# Patient Record
Sex: MALE | Race: WHITE | NOT HISPANIC OR LATINO | Employment: OTHER | URBAN - METROPOLITAN AREA
[De-identification: names, ages, dates, MRNs, and addresses within clinical notes are randomized per-mention and may not be internally consistent; named-entity substitution may affect disease eponyms.]

---

## 2017-06-02 ENCOUNTER — LAB VISIT (OUTPATIENT)
Dept: LAB | Facility: HOSPITAL | Age: 47
End: 2017-06-02
Attending: INTERNAL MEDICINE
Payer: MEDICARE

## 2017-06-02 ENCOUNTER — TELEPHONE (OUTPATIENT)
Dept: INTERNAL MEDICINE | Facility: CLINIC | Age: 47
End: 2017-06-02

## 2017-06-02 DIAGNOSIS — E78.5 HYPERLIPIDEMIA, UNSPECIFIED HYPERLIPIDEMIA TYPE: ICD-10-CM

## 2017-06-02 DIAGNOSIS — E66.3 OVERWEIGHT (BMI 25.0-29.9): ICD-10-CM

## 2017-06-02 DIAGNOSIS — K21.9 GASTROESOPHAGEAL REFLUX DISEASE, ESOPHAGITIS PRESENCE NOT SPECIFIED: ICD-10-CM

## 2017-06-02 DIAGNOSIS — R30.0 BURNING WITH URINATION: Primary | ICD-10-CM

## 2017-06-02 DIAGNOSIS — M10.069 IDIOPATHIC GOUT OF KNEE, UNSPECIFIED CHRONICITY, UNSPECIFIED LATERALITY: ICD-10-CM

## 2017-06-02 DIAGNOSIS — E55.9 VITAMIN D DEFICIENCY DISEASE: ICD-10-CM

## 2017-06-02 DIAGNOSIS — R30.0 BURNING WITH URINATION: ICD-10-CM

## 2017-06-02 LAB
25(OH)D3+25(OH)D2 SERPL-MCNC: 46 NG/ML
ALBUMIN SERPL BCP-MCNC: 3.8 G/DL
ALP SERPL-CCNC: 85 U/L
ALT SERPL W/O P-5'-P-CCNC: 19 U/L
ANION GAP SERPL CALC-SCNC: 12 MMOL/L
AST SERPL-CCNC: 20 U/L
BACTERIA #/AREA URNS HPF: NORMAL /HPF
BASOPHILS # BLD AUTO: 0.11 K/UL
BASOPHILS NFR BLD: 0.8 %
BILIRUB SERPL-MCNC: 0.6 MG/DL
BILIRUB UR QL STRIP: NEGATIVE
BUN SERPL-MCNC: 15 MG/DL
CALCIUM SERPL-MCNC: 10.3 MG/DL
CHLORIDE SERPL-SCNC: 102 MMOL/L
CHOLEST/HDLC SERPL: 6 {RATIO}
CLARITY UR: CLEAR
CO2 SERPL-SCNC: 26 MMOL/L
COLOR UR: YELLOW
CREAT SERPL-MCNC: 1.2 MG/DL
DIFFERENTIAL METHOD: ABNORMAL
EOSINOPHIL # BLD AUTO: 0.1 K/UL
EOSINOPHIL NFR BLD: 1 %
ERYTHROCYTE [DISTWIDTH] IN BLOOD BY AUTOMATED COUNT: 15.5 %
EST. GFR  (AFRICAN AMERICAN): >60 ML/MIN/1.73 M^2
EST. GFR  (NON AFRICAN AMERICAN): >60 ML/MIN/1.73 M^2
GLUCOSE SERPL-MCNC: 104 MG/DL
GLUCOSE UR QL STRIP: NEGATIVE
HCT VFR BLD AUTO: 48.4 %
HDL/CHOLESTEROL RATIO: 16.7 %
HDLC SERPL-MCNC: 240 MG/DL
HDLC SERPL-MCNC: 40 MG/DL
HGB BLD-MCNC: 16.3 G/DL
HGB UR QL STRIP: NEGATIVE
HYALINE CASTS #/AREA URNS LPF: 0 /LPF
KETONES UR QL STRIP: NEGATIVE
LDLC SERPL CALC-MCNC: 146.2 MG/DL
LEUKOCYTE ESTERASE UR QL STRIP: NEGATIVE
LYMPHOCYTES # BLD AUTO: 4.1 K/UL
LYMPHOCYTES NFR BLD: 31 %
MCH RBC QN AUTO: 29.6 PG
MCHC RBC AUTO-ENTMCNC: 33.7 %
MCV RBC AUTO: 88 FL
MICROSCOPIC COMMENT: NORMAL
MONOCYTES # BLD AUTO: 0.7 K/UL
MONOCYTES NFR BLD: 5.5 %
NEUTROPHILS # BLD AUTO: 8.1 K/UL
NEUTROPHILS NFR BLD: 61.7 %
NITRITE UR QL STRIP: NEGATIVE
NONHDLC SERPL-MCNC: 200 MG/DL
PH UR STRIP: 6 [PH] (ref 5–8)
PLATELET # BLD AUTO: 332 K/UL
PMV BLD AUTO: 9.9 FL
POTASSIUM SERPL-SCNC: 4 MMOL/L
PROT SERPL-MCNC: 8.3 G/DL
PROT UR QL STRIP: ABNORMAL
RBC # BLD AUTO: 5.5 M/UL
RBC #/AREA URNS HPF: 0 /HPF (ref 0–4)
SODIUM SERPL-SCNC: 140 MMOL/L
SP GR UR STRIP: 1.01 (ref 1–1.03)
SQUAMOUS #/AREA URNS HPF: 3 /HPF
TRIGL SERPL-MCNC: 269 MG/DL
URATE SERPL-MCNC: 4.7 MG/DL
URN SPEC COLLECT METH UR: ABNORMAL
UROBILINOGEN UR STRIP-ACNC: NEGATIVE EU/DL
WBC # BLD AUTO: 13.07 K/UL
WBC #/AREA URNS HPF: 1 /HPF (ref 0–5)

## 2017-06-02 PROCEDURE — 82306 VITAMIN D 25 HYDROXY: CPT

## 2017-06-02 PROCEDURE — 84550 ASSAY OF BLOOD/URIC ACID: CPT

## 2017-06-02 PROCEDURE — 81000 URINALYSIS NONAUTO W/SCOPE: CPT

## 2017-06-02 PROCEDURE — 80053 COMPREHEN METABOLIC PANEL: CPT

## 2017-06-02 PROCEDURE — 80061 LIPID PANEL: CPT

## 2017-06-02 PROCEDURE — 85025 COMPLETE CBC W/AUTO DIFF WBC: CPT

## 2017-06-02 PROCEDURE — 36415 COLL VENOUS BLD VENIPUNCTURE: CPT

## 2017-06-05 ENCOUNTER — OFFICE VISIT (OUTPATIENT)
Dept: INTERNAL MEDICINE | Facility: CLINIC | Age: 47
End: 2017-06-05
Payer: MEDICARE

## 2017-06-05 VITALS
HEART RATE: 93 BPM | RESPIRATION RATE: 18 BRPM | BODY MASS INDEX: 25.06 KG/M2 | SYSTOLIC BLOOD PRESSURE: 116 MMHG | WEIGHT: 127.63 LBS | DIASTOLIC BLOOD PRESSURE: 64 MMHG | HEIGHT: 60 IN

## 2017-06-05 DIAGNOSIS — E55.9 VITAMIN D DEFICIENCY DISEASE: ICD-10-CM

## 2017-06-05 DIAGNOSIS — Q90.9 DOWN SYNDROME: Primary | ICD-10-CM

## 2017-06-05 DIAGNOSIS — K21.9 GASTROESOPHAGEAL REFLUX DISEASE, ESOPHAGITIS PRESENCE NOT SPECIFIED: ICD-10-CM

## 2017-06-05 DIAGNOSIS — M10.069 IDIOPATHIC GOUT OF KNEE, UNSPECIFIED CHRONICITY, UNSPECIFIED LATERALITY: ICD-10-CM

## 2017-06-05 DIAGNOSIS — E78.5 HYPERLIPIDEMIA, UNSPECIFIED HYPERLIPIDEMIA TYPE: ICD-10-CM

## 2017-06-05 DIAGNOSIS — Z86.73 HISTORY OF TIA (TRANSIENT ISCHEMIC ATTACK): ICD-10-CM

## 2017-06-05 PROCEDURE — 99499 UNLISTED E&M SERVICE: CPT | Mod: S$GLB,,, | Performed by: INTERNAL MEDICINE

## 2017-06-05 PROCEDURE — 99214 OFFICE O/P EST MOD 30 MIN: CPT | Mod: S$GLB,,, | Performed by: INTERNAL MEDICINE

## 2017-06-05 PROCEDURE — 99999 PR PBB SHADOW E&M-EST. PATIENT-LVL III: CPT | Mod: PBBFAC,,, | Performed by: INTERNAL MEDICINE

## 2017-06-05 NOTE — PROGRESS NOTES
Subjective:       Patient ID: Janes Strange is a 47 y.o. male.    Chief Complaint: Follow-up      HPI:  Patient is known to me and presents for follow up chronic medical issues. He has Down Syndrome, lives with father.      Gout: on allopurinol. No recent flares. No joint pain or swelling today     Seasonal allergies: well controlled on Claritin. Denies sneezing, itchy eyes, cough, SOB.     HLD: on pravastatin. Denies side effects from medications. LDL around 140s, this is stable for him.     GERD: on omeprazole. Denies chest pains, abd pain, n/v/d/c. Denies belching.     History of CVA: no residual effects. On plavix therapy. Sounds like might have been TIA. No records to review. Following melo John.     No acute complaints today.  Past Medical History:   Diagnosis Date    Down's syndrome     Seizure        History reviewed. No pertinent family history.    Social History     Social History    Marital status: Single     Spouse name: N/A    Number of children: N/A    Years of education: N/A     Occupational History    Not on file.     Social History Main Topics    Smoking status: Never Smoker    Smokeless tobacco: Never Used    Alcohol use No    Drug use: No    Sexual activity: No     Other Topics Concern    Not on file     Social History Narrative    No narrative on file       Review of Systems   Constitutional: Negative for activity change, fatigue, fever and unexpected weight change.   HENT: Negative for congestion, ear pain, hearing loss, rhinorrhea, sore throat and tinnitus.    Eyes: Negative for pain, redness and visual disturbance.   Respiratory: Negative for cough, shortness of breath and wheezing.    Cardiovascular: Negative for chest pain, palpitations and leg swelling.   Gastrointestinal: Negative for abdominal pain, blood in stool, constipation, diarrhea, nausea and vomiting.   Genitourinary: Negative for decreased urine volume, dysuria, frequency and urgency.   Musculoskeletal:  Negative for back pain, joint swelling and neck pain.   Skin: Negative for color change, rash and wound.   Neurological: Negative for dizziness, tremors, weakness, light-headedness and headaches.         Objective:      Physical Exam   Constitutional: He appears well-developed and well-nourished. No distress.   HENT:   Head: Normocephalic and atraumatic.   Right Ear: External ear normal.   Left Ear: External ear normal.   Enlarged tongue consistent with Down's syndrome   Eyes: Conjunctivae and EOM are normal. Pupils are equal, round, and reactive to light.   Neck: Neck supple. No tracheal deviation present. No thyromegaly present.   Cardiovascular: Normal rate and regular rhythm.  Exam reveals no gallop and no friction rub.    No murmur heard.  Pulmonary/Chest: Effort normal and breath sounds normal. No respiratory distress. He has no wheezes. He has no rales.   Abdominal: Soft. Bowel sounds are normal. He exhibits no distension. There is no tenderness. There is no rebound and no guarding.   Neurological: He is alert.   Not cooperative with exam. Down's syndrome   Skin: Skin is warm and dry.   Psychiatric: He has a normal mood and affect. His behavior is normal.   Vitals reviewed.      Assessment:       1. Down syndrome    2. Idiopathic gout of knee, unspecified chronicity, unspecified laterality    3. Hyperlipidemia, unspecified hyperlipidemia type    4. Gastroesophageal reflux disease, esophagitis presence not specified    5. History of TIA (transient ischemic attack)    6. Vitamin D deficiency disease        Plan:       Janes was seen today for follow-up.    Diagnoses and all orders for this visit:    Down syndrome  Stable  Doing well  Living with father    Idiopathic gout of knee, unspecified chronicity, unspecified laterality  -     Uric acid; Future  Uric acid at goal  Cont allopurinol at same dose  No acute attacks    Hyperlipidemia, unspecified hyperlipidemia type  -     CBC auto differential; Future  -      Comprehensive metabolic panel; Future  -     Lipid panel; Future  Chronic, well controlled  Cont pravastatin at same dose    Gastroesophageal reflux disease, esophagitis presence not specified  Chronic, well controlled  Cont PPI at same dose    History of TIA (transient ischemic attack)  -     CBC auto differential; Future  -     Comprehensive metabolic panel; Future  -     Lipid panel; Future  Cont Plavix  No new sx. No residual deficits    Vitamin D deficiency disease  -     Vitamin D; Future  Chronic, controlled  Cont Vit D at same dose  Labs normal    Health Maintenance:  -CRC: not due  -PSA: not due  -Bone Density: not due  -AAA: never smoker  -tobacco: never smoker  -Vaccines  Flu 2016 completed      RTC 6 months with labs and PRN

## 2017-11-17 RX ORDER — ALLOPURINOL 300 MG/1
TABLET ORAL
Qty: 90 TABLET | Refills: 3 | Status: SHIPPED | OUTPATIENT
Start: 2017-11-17 | End: 2017-12-04 | Stop reason: SDUPTHER

## 2017-11-17 RX ORDER — CLOPIDOGREL BISULFATE 75 MG/1
TABLET ORAL
Qty: 90 TABLET | Refills: 3 | Status: SHIPPED | OUTPATIENT
Start: 2017-11-17 | End: 2017-12-04 | Stop reason: SDUPTHER

## 2017-11-27 ENCOUNTER — LAB VISIT (OUTPATIENT)
Dept: LAB | Facility: HOSPITAL | Age: 47
End: 2017-11-27
Attending: INTERNAL MEDICINE
Payer: MEDICARE

## 2017-11-27 DIAGNOSIS — Z86.73 HISTORY OF TIA (TRANSIENT ISCHEMIC ATTACK): ICD-10-CM

## 2017-11-27 DIAGNOSIS — E78.5 HYPERLIPIDEMIA, UNSPECIFIED HYPERLIPIDEMIA TYPE: ICD-10-CM

## 2017-11-27 DIAGNOSIS — M10.069 IDIOPATHIC GOUT OF KNEE, UNSPECIFIED CHRONICITY, UNSPECIFIED LATERALITY: ICD-10-CM

## 2017-11-27 DIAGNOSIS — E55.9 VITAMIN D DEFICIENCY DISEASE: ICD-10-CM

## 2017-11-27 LAB
25(OH)D3+25(OH)D2 SERPL-MCNC: 34 NG/ML
ALBUMIN SERPL BCP-MCNC: 3.3 G/DL
ALP SERPL-CCNC: 87 U/L
ALT SERPL W/O P-5'-P-CCNC: 24 U/L
ANION GAP SERPL CALC-SCNC: 9 MMOL/L
AST SERPL-CCNC: 21 U/L
BASOPHILS # BLD AUTO: 0.18 K/UL
BASOPHILS NFR BLD: 1.6 %
BILIRUB SERPL-MCNC: 0.3 MG/DL
BUN SERPL-MCNC: 27 MG/DL
CALCIUM SERPL-MCNC: 9.9 MG/DL
CHLORIDE SERPL-SCNC: 107 MMOL/L
CHOLEST SERPL-MCNC: 202 MG/DL
CHOLEST/HDLC SERPL: 4.9 {RATIO}
CO2 SERPL-SCNC: 26 MMOL/L
CREAT SERPL-MCNC: 1.1 MG/DL
DIFFERENTIAL METHOD: ABNORMAL
EOSINOPHIL # BLD AUTO: 0.1 K/UL
EOSINOPHIL NFR BLD: 0.8 %
ERYTHROCYTE [DISTWIDTH] IN BLOOD BY AUTOMATED COUNT: 15.3 %
EST. GFR  (AFRICAN AMERICAN): >60 ML/MIN/1.73 M^2
EST. GFR  (NON AFRICAN AMERICAN): >60 ML/MIN/1.73 M^2
GLUCOSE SERPL-MCNC: 110 MG/DL
HCT VFR BLD AUTO: 46.1 %
HDLC SERPL-MCNC: 41 MG/DL
HDLC SERPL: 20.3 %
HGB BLD-MCNC: 15.3 G/DL
LDLC SERPL CALC-MCNC: 123.2 MG/DL
LYMPHOCYTES # BLD AUTO: 4.2 K/UL
LYMPHOCYTES NFR BLD: 36.5 %
MCH RBC QN AUTO: 29.9 PG
MCHC RBC AUTO-ENTMCNC: 33.2 G/DL
MCV RBC AUTO: 90 FL
MONOCYTES # BLD AUTO: 0.8 K/UL
MONOCYTES NFR BLD: 7.2 %
NEUTROPHILS # BLD AUTO: 6.2 K/UL
NEUTROPHILS NFR BLD: 53.9 %
NONHDLC SERPL-MCNC: 161 MG/DL
PLATELET # BLD AUTO: 311 K/UL
PMV BLD AUTO: 9.6 FL
POTASSIUM SERPL-SCNC: 4.2 MMOL/L
PROT SERPL-MCNC: 7.3 G/DL
RBC # BLD AUTO: 5.11 M/UL
SODIUM SERPL-SCNC: 142 MMOL/L
TRIGL SERPL-MCNC: 189 MG/DL
URATE SERPL-MCNC: 4.6 MG/DL
WBC # BLD AUTO: 11.46 K/UL

## 2017-11-27 PROCEDURE — 80061 LIPID PANEL: CPT

## 2017-11-27 PROCEDURE — 80053 COMPREHEN METABOLIC PANEL: CPT

## 2017-11-27 PROCEDURE — 85025 COMPLETE CBC W/AUTO DIFF WBC: CPT

## 2017-11-27 PROCEDURE — 82306 VITAMIN D 25 HYDROXY: CPT

## 2017-11-27 PROCEDURE — 36415 COLL VENOUS BLD VENIPUNCTURE: CPT

## 2017-11-27 PROCEDURE — 84550 ASSAY OF BLOOD/URIC ACID: CPT

## 2017-12-04 ENCOUNTER — OFFICE VISIT (OUTPATIENT)
Dept: INTERNAL MEDICINE | Facility: CLINIC | Age: 47
End: 2017-12-04
Payer: MEDICARE

## 2017-12-04 VITALS
SYSTOLIC BLOOD PRESSURE: 108 MMHG | HEART RATE: 86 BPM | BODY MASS INDEX: 26.92 KG/M2 | OXYGEN SATURATION: 98 % | HEIGHT: 60 IN | DIASTOLIC BLOOD PRESSURE: 62 MMHG | RESPIRATION RATE: 18 BRPM | WEIGHT: 137.13 LBS

## 2017-12-04 DIAGNOSIS — Z88.9 H/O SEASONAL ALLERGIES: ICD-10-CM

## 2017-12-04 DIAGNOSIS — Q90.9 DOWN SYNDROME: Primary | ICD-10-CM

## 2017-12-04 DIAGNOSIS — E66.3 OVERWEIGHT (BMI 25.0-29.9): ICD-10-CM

## 2017-12-04 DIAGNOSIS — M10.069 IDIOPATHIC GOUT OF KNEE, UNSPECIFIED CHRONICITY, UNSPECIFIED LATERALITY: ICD-10-CM

## 2017-12-04 DIAGNOSIS — E78.5 HYPERLIPIDEMIA, UNSPECIFIED HYPERLIPIDEMIA TYPE: ICD-10-CM

## 2017-12-04 DIAGNOSIS — K21.9 GASTROESOPHAGEAL REFLUX DISEASE, ESOPHAGITIS PRESENCE NOT SPECIFIED: ICD-10-CM

## 2017-12-04 DIAGNOSIS — E55.9 VITAMIN D DEFICIENCY DISEASE: ICD-10-CM

## 2017-12-04 DIAGNOSIS — Z86.73 HISTORY OF TIA (TRANSIENT ISCHEMIC ATTACK): ICD-10-CM

## 2017-12-04 PROCEDURE — 99214 OFFICE O/P EST MOD 30 MIN: CPT | Mod: S$GLB,,, | Performed by: INTERNAL MEDICINE

## 2017-12-04 PROCEDURE — 99999 PR PBB SHADOW E&M-EST. PATIENT-LVL III: CPT | Mod: PBBFAC,,, | Performed by: INTERNAL MEDICINE

## 2017-12-04 PROCEDURE — 99499 UNLISTED E&M SERVICE: CPT | Mod: S$GLB,,, | Performed by: INTERNAL MEDICINE

## 2017-12-04 RX ORDER — ALLOPURINOL 300 MG/1
300 TABLET ORAL DAILY
Qty: 90 TABLET | Refills: 3 | Status: SHIPPED | OUTPATIENT
Start: 2017-12-04 | End: 2018-08-17 | Stop reason: SDUPTHER

## 2017-12-04 RX ORDER — PRAVASTATIN SODIUM 40 MG/1
40 TABLET ORAL DAILY
Qty: 90 TABLET | Refills: 3 | Status: SHIPPED | OUTPATIENT
Start: 2017-12-04 | End: 2018-08-17 | Stop reason: SDUPTHER

## 2017-12-04 RX ORDER — CLOPIDOGREL BISULFATE 75 MG/1
75 TABLET ORAL DAILY
Qty: 90 TABLET | Refills: 3 | Status: SHIPPED | OUTPATIENT
Start: 2017-12-04 | End: 2018-08-17 | Stop reason: SDUPTHER

## 2017-12-04 RX ORDER — LORATADINE 10 MG/1
10 TABLET ORAL DAILY
Qty: 90 TABLET | Refills: 3 | Status: SHIPPED | OUTPATIENT
Start: 2017-12-04 | End: 2018-08-17 | Stop reason: SDUPTHER

## 2017-12-04 RX ORDER — OMEPRAZOLE 20 MG/1
20 CAPSULE, DELAYED RELEASE ORAL DAILY
Qty: 90 CAPSULE | Refills: 3 | Status: SHIPPED | OUTPATIENT
Start: 2017-12-04 | End: 2018-08-17 | Stop reason: SDUPTHER

## 2017-12-04 NOTE — PROGRESS NOTES
Subjective:       Patient ID: Janes Strange is a 47 y.o. male.    Chief Complaint: Follow-up (6 month followup) and Medication Refill (meds refill)      HPI:  Patient is known to me and presents for follow up chronic medical issues. He has Down Syndrome, lives with father. Labs from 11/27/17 personally reviewed and discussed with the patient today.      Gout: on allopurinol. No recent flares. No joint pain or swelling today. Uric acid normal.     Seasonal allergies: well controlled on Claritin. Denies sneezing, itchy eyes, cough, SOB.     HLD: on pravastatin. Denies side effects from medications. LDL down to 120 from 140s.     GERD: on omeprazole. Denies chest pains, abd pain, n/v/d/c. Denies belching.     History of CVA: no residual effects. On plavix therapy. Sounds like might have been TIA. No records to review. Following with Dr. John.  Past Medical History:   Diagnosis Date    Down's syndrome     Seizure        History reviewed. No pertinent family history.    Social History     Social History    Marital status: Single     Spouse name: N/A    Number of children: N/A    Years of education: N/A     Occupational History    Not on file.     Social History Main Topics    Smoking status: Never Smoker    Smokeless tobacco: Never Used    Alcohol use No    Drug use: No    Sexual activity: No     Other Topics Concern    Not on file     Social History Narrative    No narrative on file       Review of Systems   Constitutional: Negative for activity change, fatigue, fever and unexpected weight change.   HENT: Negative for congestion, ear pain, hearing loss, rhinorrhea and sore throat.    Eyes: Negative for pain, redness and visual disturbance.   Respiratory: Negative for cough, shortness of breath and wheezing.    Cardiovascular: Negative for chest pain, palpitations and leg swelling.   Gastrointestinal: Negative for abdominal pain, constipation, diarrhea, nausea and vomiting.   Genitourinary: Negative for  decreased urine volume, dysuria, frequency and urgency.   Musculoskeletal: Negative for back pain, joint swelling and neck pain.   Skin: Negative for color change, rash and wound.   Neurological: Negative for dizziness, tremors, weakness, light-headedness and headaches.         Objective:      Physical Exam   Constitutional: He is oriented to person, place, and time. He appears well-developed and well-nourished. No distress.   HENT:   Head: Normocephalic and atraumatic.   Right Ear: External ear normal.   Left Ear: External ear normal.   Eyes: Conjunctivae and EOM are normal. Pupils are equal, round, and reactive to light. Right eye exhibits no discharge. Left eye exhibits no discharge.   Neck: Neck supple. No tracheal deviation present.   Cardiovascular: Normal rate and regular rhythm.    No murmur heard.  Pulmonary/Chest: Effort normal and breath sounds normal. No respiratory distress. He has no wheezes. He has no rales.   Abdominal: Soft. Bowel sounds are normal. He exhibits no distension. There is no tenderness. There is no rebound and no guarding.   Neurological: He is alert and oriented to person, place, and time. No cranial nerve deficit.   Skin: Skin is warm and dry.   Psychiatric: He has a normal mood and affect. His behavior is normal.   Vitals reviewed.      Assessment:       1. Down syndrome    2. Overweight (BMI 25.0-29.9)    3. Hyperlipidemia, unspecified hyperlipidemia type    4. History of TIA (transient ischemic attack)    5. Vitamin D deficiency disease    6. Gastroesophageal reflux disease, esophagitis presence not specified    7. H/O seasonal allergies    8. Idiopathic gout of knee, unspecified chronicity, unspecified laterality        Plan:       Janes was seen today for follow-up and medication refill.    Diagnoses and all orders for this visit:    Down syndrome  Stable  Living with father who is caring for him    Overweight (BMI 25.0-29.9)  -     CBC auto differential; Future  -      Comprehensive metabolic panel; Future  -     Lipid panel; Future  -     Uric acid; Future  Diet and exercise discussed    Hyperlipidemia, unspecified hyperlipidemia type  -     pravastatin (PRAVACHOL) 40 MG tablet; Take 1 tablet (40 mg total) by mouth once daily.  -     CBC auto differential; Future  -     Comprehensive metabolic panel; Future  -     Lipid panel; Future  -     Uric acid; Future  Chronic controlled  Cont statin    History of TIA (transient ischemic attack)  -     pravastatin (PRAVACHOL) 40 MG tablet; Take 1 tablet (40 mg total) by mouth once daily.  -     clopidogrel (PLAVIX) 75 mg tablet; Take 1 tablet (75 mg total) by mouth once daily.  Stable  Cont plavix and statin    Vitamin D deficiency disease  Labs reviewed  Cont supplement    Gastroesophageal reflux disease, esophagitis presence not specified  -     omeprazole (PRILOSEC) 20 MG capsule; Take 1 capsule (20 mg total) by mouth once daily.  Chronic controlled  Cont PPI at same dose    H/O seasonal allergies  -     loratadine (CLARITIN) 10 mg tablet; Take 1 tablet (10 mg total) by mouth once daily.  Chronic controlled  Cont PRN claritin    Idiopathic gout of knee, unspecified chronicity, unspecified laterality  -     allopurinol (ZYLOPRIM) 300 MG tablet; Take 1 tablet (300 mg total) by mouth once daily.  -     Comprehensive metabolic panel; Future  -     Uric acid; Future  Chronic ocntrolled  No flare  Cont allopurinol same dose       Health Maintenance:  -CRC: not due  -PSA: not due  -Bone Density: not due  -AAA: never smoker  -tobacco: never smoker  -Vaccines  Flu 2017 completed    RTC 6 months and PRN

## 2018-04-16 ENCOUNTER — TELEPHONE (OUTPATIENT)
Dept: INTERNAL MEDICINE | Facility: CLINIC | Age: 48
End: 2018-04-16

## 2018-04-16 NOTE — TELEPHONE ENCOUNTER
----- Message from Jorge Cruz sent at 2018  9:33 AM CDT -----  Contact: FATHER   Janes Strange  MRN: [id number]  : 1970  PCP: Angie Bain  Home Phone      [phone]  Work Phone      Not on file.  Mobile          [phone]      MESSAGE: NEEDS REFILL ON CHERATUSSIN. HAVING BAD, HACKING COUGH.     PHONE: [phone]    PHARMACY: [facility]

## 2018-04-17 ENCOUNTER — OFFICE VISIT (OUTPATIENT)
Dept: INTERNAL MEDICINE | Facility: CLINIC | Age: 48
End: 2018-04-17
Payer: MEDICARE

## 2018-04-17 VITALS
BODY MASS INDEX: 26.44 KG/M2 | SYSTOLIC BLOOD PRESSURE: 112 MMHG | HEIGHT: 60 IN | RESPIRATION RATE: 18 BRPM | WEIGHT: 134.69 LBS | HEART RATE: 102 BPM | DIASTOLIC BLOOD PRESSURE: 80 MMHG | TEMPERATURE: 98 F | OXYGEN SATURATION: 97 %

## 2018-04-17 DIAGNOSIS — J06.9 VIRAL URI WITH COUGH: Primary | ICD-10-CM

## 2018-04-17 PROCEDURE — 99999 PR PBB SHADOW E&M-EST. PATIENT-LVL III: CPT | Mod: PBBFAC,,, | Performed by: INTERNAL MEDICINE

## 2018-04-17 PROCEDURE — 99213 OFFICE O/P EST LOW 20 MIN: CPT | Mod: S$GLB,,, | Performed by: INTERNAL MEDICINE

## 2018-04-17 RX ORDER — CODEINE PHOSPHATE AND GUAIFENESIN 10; 100 MG/5ML; MG/5ML
10 SOLUTION ORAL EVERY 6 HOURS PRN
Qty: 236 ML | Refills: 0 | Status: SHIPPED | OUTPATIENT
Start: 2018-04-17 | End: 2018-04-27

## 2018-04-17 NOTE — PROGRESS NOTES
Subjective:       Patient ID: Janes Strange is a 47 y.o. male.    Chief Complaint: Cough      HPI:  Patient is known to me and presents with cough. History provided by father. Sx started 1 week ago. Cough is not productive. No SOB of wheezing. Denies rhinorrhea or congestion. No fevers.     Past Medical History:   Diagnosis Date    Down's syndrome     Seizure        History reviewed. No pertinent family history.    Social History     Social History    Marital status: Single     Spouse name: N/A    Number of children: N/A    Years of education: N/A     Occupational History    Not on file.     Social History Main Topics    Smoking status: Never Smoker    Smokeless tobacco: Never Used    Alcohol use No    Drug use: No    Sexual activity: No     Other Topics Concern    Not on file     Social History Narrative    No narrative on file       Review of Systems   Unable to perform ROS: Patient nonverbal         Objective:      Physical Exam   Constitutional: He is oriented to person, place, and time. He appears well-developed and well-nourished. No distress.   HENT:   Head: Normocephalic and atraumatic.   Right Ear: Tympanic membrane and external ear normal.   Left Ear: Tympanic membrane and external ear normal.   Not cooperative with oropharyngeal exam   Eyes: Conjunctivae and EOM are normal. Pupils are equal, round, and reactive to light. Right eye exhibits no discharge. Left eye exhibits no discharge.   Neck: Neck supple. No tracheal deviation present.   Cardiovascular: Normal rate and regular rhythm.  Exam reveals no gallop.    No murmur heard.  Pulmonary/Chest: Effort normal and breath sounds normal. No respiratory distress. He has no wheezes. He has no rales.   Abdominal: Soft. Bowel sounds are normal. He exhibits no distension. There is no tenderness.   Neurological: He is alert and oriented to person, place, and time. No cranial nerve deficit.   Skin: Skin is warm and dry.   Psychiatric: He has a normal  mood and affect. His behavior is normal.   Vitals reviewed.      Assessment:       1. Viral URI with cough        Plan:       Janes was seen today for cough.    Diagnoses and all orders for this visit:    Viral URI with cough  -     guaifenesin-codeine 100-10 mg/5 ml (TUSSI-ORGANIDIN NR)  mg/5 mL syrup; Take 10 mLs by mouth every 6 (six) hours as needed for Cough.    cont claritin  Declines IM steroids today which is fine, lung exam is normal, no wheezing  mucinex OK OTC  Call for worsening sx or fever > 101 F

## 2018-05-04 ENCOUNTER — TELEPHONE (OUTPATIENT)
Dept: INTERNAL MEDICINE | Facility: CLINIC | Age: 48
End: 2018-05-04

## 2018-05-04 RX ORDER — CODEINE PHOSPHATE AND GUAIFENESIN 10; 100 MG/5ML; MG/5ML
5 SOLUTION ORAL 3 TIMES DAILY PRN
Qty: 120 ML | Refills: 0 | Status: SHIPPED | OUTPATIENT
Start: 2018-05-04 | End: 2018-05-14 | Stop reason: SDUPTHER

## 2018-05-04 NOTE — TELEPHONE ENCOUNTER
----- Message from Tamiko Gallego sent at 2018 10:09 AM CDT -----  Contact: Rolando/father  Janes Strange  MRN: [id number]  : 1970  PCP: Angie Bain  Home Phone      [phone]  Work Phone      Not on file.  Mobile          [phone]      MESSAGE: Rolando is requesting a refill on guaifenesin-codeine 100-10 mg/5 ml (TUSSI-ORGANIDIN NR)  mg/5 mL syrup to be sent to [facility]. He states the patient's cough is better but not gone.  Phone:[phone]

## 2018-05-07 ENCOUNTER — TELEPHONE (OUTPATIENT)
Dept: INTERNAL MEDICINE | Facility: CLINIC | Age: 48
End: 2018-05-07

## 2018-05-07 NOTE — TELEPHONE ENCOUNTER
----- Message from Jorge Cruz sent at 2018  8:30 AM CDT -----  Contact: FATHER  Janes Strange  MRN: [id number]  : 1970  PCP: Angie Bain  Home Phone      [phone]  Work Phone      Not on file.  Mobile          [phone]      MESSAGE: SAID THAT COUGH SYRUP DID NOT MAKE IT TO THE PHARMACY ON Friday. PLEASE RE-SEND ASAP. HAVE BEEN WAITING SINCE Friday FOR IT.     PHONE:

## 2018-05-14 ENCOUNTER — OFFICE VISIT (OUTPATIENT)
Dept: INTERNAL MEDICINE | Facility: CLINIC | Age: 48
End: 2018-05-14
Payer: MEDICARE

## 2018-05-14 VITALS
DIASTOLIC BLOOD PRESSURE: 70 MMHG | WEIGHT: 125 LBS | TEMPERATURE: 98 F | BODY MASS INDEX: 24.54 KG/M2 | RESPIRATION RATE: 20 BRPM | HEART RATE: 110 BPM | HEIGHT: 60 IN | SYSTOLIC BLOOD PRESSURE: 110 MMHG

## 2018-05-14 DIAGNOSIS — J20.8 ACUTE BACTERIAL BRONCHITIS: Primary | ICD-10-CM

## 2018-05-14 DIAGNOSIS — B96.89 ACUTE BACTERIAL BRONCHITIS: Primary | ICD-10-CM

## 2018-05-14 PROCEDURE — 3008F BODY MASS INDEX DOCD: CPT | Mod: CPTII,S$GLB,, | Performed by: NURSE PRACTITIONER

## 2018-05-14 PROCEDURE — 99999 PR PBB SHADOW E&M-EST. PATIENT-LVL IV: CPT | Mod: PBBFAC,,, | Performed by: NURSE PRACTITIONER

## 2018-05-14 PROCEDURE — 99213 OFFICE O/P EST LOW 20 MIN: CPT | Mod: 25,S$GLB,, | Performed by: NURSE PRACTITIONER

## 2018-05-14 PROCEDURE — 96372 THER/PROPH/DIAG INJ SC/IM: CPT | Mod: S$GLB,,, | Performed by: NURSE PRACTITIONER

## 2018-05-14 RX ORDER — ALBUTEROL SULFATE 0.83 MG/ML
2.5 SOLUTION RESPIRATORY (INHALATION) EVERY 6 HOURS PRN
Qty: 90 ML | Refills: 1 | Status: SHIPPED | OUTPATIENT
Start: 2018-05-14 | End: 2019-04-01 | Stop reason: SDUPTHER

## 2018-05-14 RX ORDER — AZITHROMYCIN 250 MG/1
TABLET, FILM COATED ORAL
Qty: 6 TABLET | Refills: 0 | Status: SHIPPED | OUTPATIENT
Start: 2018-05-14 | End: 2018-05-19

## 2018-05-14 RX ORDER — CODEINE PHOSPHATE AND GUAIFENESIN 10; 100 MG/5ML; MG/5ML
5 SOLUTION ORAL 3 TIMES DAILY PRN
Qty: 236 ML | Refills: 0 | Status: SHIPPED | OUTPATIENT
Start: 2018-05-14 | End: 2019-02-18

## 2018-05-14 RX ORDER — METHYLPREDNISOLONE ACETATE 80 MG/ML
80 INJECTION, SUSPENSION INTRA-ARTICULAR; INTRALESIONAL; INTRAMUSCULAR; SOFT TISSUE
Status: COMPLETED | OUTPATIENT
Start: 2018-05-14 | End: 2018-05-14

## 2018-05-14 RX ADMIN — METHYLPREDNISOLONE ACETATE 80 MG: 80 INJECTION, SUSPENSION INTRA-ARTICULAR; INTRALESIONAL; INTRAMUSCULAR; SOFT TISSUE at 09:05

## 2018-05-14 NOTE — PROGRESS NOTES
Subjective:       Patient ID: Janes Strange is a 48 y.o. male.    Chief Complaint: Cough (x 1 month)    Patient is unknown, to me and presents with   Chief Complaint   Patient presents with    Cough     x 1 month   .  Denies chest pain and shortness of breath.  Patient presents with worsening cough and congestion. His father states that he has had cough meds prescribed but not any better. Patient's father also states that he is concerned he may get pneumonia   HPI  Review of Systems   Constitutional: Negative.    HENT: Positive for congestion and postnasal drip.    Eyes: Negative.    Respiratory: Positive for cough.    Cardiovascular: Negative.    Skin: Negative.    Hematological: Negative.        Objective:      Physical Exam   Constitutional: He appears well-developed and well-nourished. No distress.   HENT:   Head: Normocephalic and atraumatic.   Right Ear: Tympanic membrane mobility is abnormal.   Left Ear: Tympanic membrane mobility is abnormal.   Nose: Mucosal edema present.   Mouth/Throat: No oropharyngeal exudate or posterior oropharyngeal erythema.   Eyes: Conjunctivae are normal. Right eye exhibits no discharge. Left eye exhibits no discharge.   Neck: Normal range of motion. Neck supple. No JVD present. No tracheal deviation present. No thyromegaly present.   Cardiovascular: Normal rate, regular rhythm and normal heart sounds.    No murmur heard.  Pulmonary/Chest: Effort normal. No stridor. He has wheezes in the right middle field, the right lower field, the left middle field and the left lower field. He has rhonchi in the right middle field, the right lower field, the left middle field and the left lower field.   Lymphadenopathy:     He has no cervical adenopathy.   Skin: Skin is warm and dry. Capillary refill takes less than 2 seconds. No rash noted. He is not diaphoretic. No erythema. No pallor.       Assessment:       1. Acute bacterial bronchitis        Plan:   Janes was seen today for  "cough.    Diagnoses and all orders for this visit:    Acute bacterial bronchitis  -     guaifenesin-codeine 100-10 mg/5 ml (TUSSI-ORGANIDIN NR)  mg/5 mL syrup; Take 5 mLs by mouth 3 (three) times daily as needed for Cough.  -     methylPREDNISolone acetate injection 80 mg; Inject 1 mL (80 mg total) into the muscle one time.  -     azithromycin (Z-DOMINIC) 250 MG tablet; Take 2 tablets by mouth on day 1; Take 1 tablet by mouth on days 2-5  -     albuterol (PROVENTIL) 2.5 mg /3 mL (0.083 %) nebulizer solution; Take 3 mLs (2.5 mg total) by nebulization every 6 (six) hours as needed for Wheezing. Rescue  -     NEBULIZER FOR HOME USE    "This note will not be shared with the patient."  Will treat as above and if no improvement will need to let me know  Instructed on neb tx and to do at least tid  If no improvement will need to let me know  rtc as scheduled  "

## 2018-06-01 ENCOUNTER — TELEPHONE (OUTPATIENT)
Dept: INTERNAL MEDICINE | Facility: CLINIC | Age: 48
End: 2018-06-01

## 2018-06-01 DIAGNOSIS — K92.1 BLOOD IN STOOL: Primary | ICD-10-CM

## 2018-08-17 ENCOUNTER — OFFICE VISIT (OUTPATIENT)
Dept: INTERNAL MEDICINE | Facility: CLINIC | Age: 48
End: 2018-08-17
Payer: MEDICARE

## 2018-08-17 ENCOUNTER — LAB VISIT (OUTPATIENT)
Dept: LAB | Facility: HOSPITAL | Age: 48
End: 2018-08-17
Attending: INTERNAL MEDICINE
Payer: MEDICARE

## 2018-08-17 VITALS
SYSTOLIC BLOOD PRESSURE: 114 MMHG | HEIGHT: 60 IN | HEART RATE: 97 BPM | WEIGHT: 134.06 LBS | RESPIRATION RATE: 18 BRPM | DIASTOLIC BLOOD PRESSURE: 78 MMHG | BODY MASS INDEX: 26.32 KG/M2

## 2018-08-17 DIAGNOSIS — E66.3 OVERWEIGHT (BMI 25.0-29.9): Primary | ICD-10-CM

## 2018-08-17 DIAGNOSIS — M10.069 IDIOPATHIC GOUT OF KNEE, UNSPECIFIED CHRONICITY, UNSPECIFIED LATERALITY: ICD-10-CM

## 2018-08-17 DIAGNOSIS — E78.5 HYPERLIPIDEMIA, UNSPECIFIED HYPERLIPIDEMIA TYPE: ICD-10-CM

## 2018-08-17 DIAGNOSIS — Z86.73 HISTORY OF TIA (TRANSIENT ISCHEMIC ATTACK): ICD-10-CM

## 2018-08-17 DIAGNOSIS — K21.9 GASTROESOPHAGEAL REFLUX DISEASE, ESOPHAGITIS PRESENCE NOT SPECIFIED: ICD-10-CM

## 2018-08-17 DIAGNOSIS — Z88.9 H/O SEASONAL ALLERGIES: ICD-10-CM

## 2018-08-17 DIAGNOSIS — E66.3 OVERWEIGHT (BMI 25.0-29.9): ICD-10-CM

## 2018-08-17 LAB
ALBUMIN SERPL BCP-MCNC: 3.6 G/DL
ALP SERPL-CCNC: 98 U/L
ALT SERPL W/O P-5'-P-CCNC: 28 U/L
ANION GAP SERPL CALC-SCNC: 11 MMOL/L
AST SERPL-CCNC: 24 U/L
BASOPHILS # BLD AUTO: 0.09 K/UL
BASOPHILS NFR BLD: 0.8 %
BILIRUB SERPL-MCNC: 0.6 MG/DL
BUN SERPL-MCNC: 11 MG/DL
CALCIUM SERPL-MCNC: 10.1 MG/DL
CHLORIDE SERPL-SCNC: 103 MMOL/L
CHOLEST SERPL-MCNC: 247 MG/DL
CHOLEST/HDLC SERPL: 6 {RATIO}
CO2 SERPL-SCNC: 25 MMOL/L
CREAT SERPL-MCNC: 1 MG/DL
DIFFERENTIAL METHOD: ABNORMAL
EOSINOPHIL # BLD AUTO: 0 K/UL
EOSINOPHIL NFR BLD: 0.3 %
ERYTHROCYTE [DISTWIDTH] IN BLOOD BY AUTOMATED COUNT: 16 %
EST. GFR  (AFRICAN AMERICAN): >60 ML/MIN/1.73 M^2
EST. GFR  (NON AFRICAN AMERICAN): >60 ML/MIN/1.73 M^2
GLUCOSE SERPL-MCNC: 104 MG/DL
HCT VFR BLD AUTO: 48.7 %
HDLC SERPL-MCNC: 41 MG/DL
HDLC SERPL: 16.6 %
HGB BLD-MCNC: 16.3 G/DL
LDLC SERPL CALC-MCNC: 150 MG/DL
LYMPHOCYTES # BLD AUTO: 2.8 K/UL
LYMPHOCYTES NFR BLD: 24.5 %
MCH RBC QN AUTO: 29.7 PG
MCHC RBC AUTO-ENTMCNC: 33.5 G/DL
MCV RBC AUTO: 89 FL
MONOCYTES # BLD AUTO: 0.7 K/UL
MONOCYTES NFR BLD: 5.9 %
NEUTROPHILS # BLD AUTO: 7.8 K/UL
NEUTROPHILS NFR BLD: 68.5 %
NONHDLC SERPL-MCNC: 206 MG/DL
PLATELET # BLD AUTO: 301 K/UL
PMV BLD AUTO: 9.7 FL
POTASSIUM SERPL-SCNC: 4.2 MMOL/L
PROT SERPL-MCNC: 7.8 G/DL
RBC # BLD AUTO: 5.49 M/UL
SODIUM SERPL-SCNC: 139 MMOL/L
TRIGL SERPL-MCNC: 280 MG/DL
URATE SERPL-MCNC: 3.9 MG/DL
WBC # BLD AUTO: 11.33 K/UL

## 2018-08-17 PROCEDURE — 3008F BODY MASS INDEX DOCD: CPT | Mod: CPTII,S$GLB,, | Performed by: INTERNAL MEDICINE

## 2018-08-17 PROCEDURE — 85025 COMPLETE CBC W/AUTO DIFF WBC: CPT

## 2018-08-17 PROCEDURE — 99999 PR PBB SHADOW E&M-EST. PATIENT-LVL III: CPT | Mod: PBBFAC,,, | Performed by: INTERNAL MEDICINE

## 2018-08-17 PROCEDURE — 84550 ASSAY OF BLOOD/URIC ACID: CPT

## 2018-08-17 PROCEDURE — 80061 LIPID PANEL: CPT

## 2018-08-17 PROCEDURE — 36415 COLL VENOUS BLD VENIPUNCTURE: CPT

## 2018-08-17 PROCEDURE — 99214 OFFICE O/P EST MOD 30 MIN: CPT | Mod: S$GLB,,, | Performed by: INTERNAL MEDICINE

## 2018-08-17 PROCEDURE — 80053 COMPREHEN METABOLIC PANEL: CPT

## 2018-08-17 RX ORDER — PRAVASTATIN SODIUM 40 MG/1
40 TABLET ORAL DAILY
Qty: 90 TABLET | Refills: 3 | Status: SHIPPED | OUTPATIENT
Start: 2018-08-17 | End: 2019-02-18 | Stop reason: SDUPTHER

## 2018-08-17 RX ORDER — ALLOPURINOL 300 MG/1
300 TABLET ORAL DAILY
Qty: 90 TABLET | Refills: 3 | Status: SHIPPED | OUTPATIENT
Start: 2018-08-17 | End: 2019-02-18 | Stop reason: SDUPTHER

## 2018-08-17 RX ORDER — CLOPIDOGREL BISULFATE 75 MG/1
75 TABLET ORAL DAILY
Qty: 90 TABLET | Refills: 3 | Status: SHIPPED | OUTPATIENT
Start: 2018-08-17 | End: 2019-02-18 | Stop reason: SDUPTHER

## 2018-08-17 RX ORDER — OMEPRAZOLE 20 MG/1
20 CAPSULE, DELAYED RELEASE ORAL DAILY
Qty: 90 CAPSULE | Refills: 3 | Status: SHIPPED | OUTPATIENT
Start: 2018-08-17 | End: 2019-09-27 | Stop reason: SDUPTHER

## 2018-08-17 RX ORDER — LORATADINE 10 MG/1
10 TABLET ORAL DAILY
Qty: 90 TABLET | Refills: 3 | Status: SHIPPED | OUTPATIENT
Start: 2018-08-17 | End: 2020-05-26 | Stop reason: SDUPTHER

## 2018-08-17 NOTE — PROGRESS NOTES
Subjective:       Patient ID: Janes Strange is a 48 y.o. male.    Chief Complaint: Follow-up      HPI:  Patient is known to me and presents for follow up chronic medical issues. He has Down Syndrome, lives with father. No labs prior to today's visit, wants to do today but he is not fasting.     Gout: on allopurinol. No recent flares. No joint pain or swelling today. Uric acid normal.     Seasonal allergies: well controlled on Claritin. Denies sneezing, itchy eyes, cough, SOB.     HLD: on pravastatin. Denies side effects from medications. Last LDL down to 120 from 140s.     GERD: on omeprazole. Denies chest pains, abd pain, n/v/d/c. Denies belching.     History of CVA: no residual effects. On plavix therapy. Sounds like might have been TIA. No records to review. Following with Dr. John.    Past Medical History:   Diagnosis Date    Down's syndrome     Seizure        History reviewed. No pertinent family history.    Social History     Socioeconomic History    Marital status: Single     Spouse name: Not on file    Number of children: Not on file    Years of education: Not on file    Highest education level: Not on file   Social Needs    Financial resource strain: Not on file    Food insecurity - worry: Not on file    Food insecurity - inability: Not on file    Transportation needs - medical: Not on file    Transportation needs - non-medical: Not on file   Occupational History    Not on file   Tobacco Use    Smoking status: Never Smoker    Smokeless tobacco: Never Used   Substance and Sexual Activity    Alcohol use: No    Drug use: No    Sexual activity: No   Other Topics Concern    Not on file   Social History Narrative    Not on file       Review of Systems   Unable to perform ROS: Patient nonverbal       Down's syndrome, unreliable historian    Objective:      Physical Exam   Constitutional: He is oriented to person, place, and time. He appears well-developed and well-nourished. No distress.    HENT:   Head: Normocephalic and atraumatic.   Right Ear: External ear normal.   Left Ear: External ear normal.   Eyes: Conjunctivae and EOM are normal. Pupils are equal, round, and reactive to light. Right eye exhibits no discharge. Left eye exhibits no discharge.   Neck: Neck supple. No tracheal deviation present.   Cardiovascular: Normal rate and regular rhythm.   No murmur heard.  Pulmonary/Chest: Effort normal and breath sounds normal. No respiratory distress. He has no wheezes.   Abdominal: Soft. Bowel sounds are normal. He exhibits no distension. There is no tenderness.   Neurological: He is alert and oriented to person, place, and time. No cranial nerve deficit.   Skin: Skin is warm and dry.   Psychiatric: He has a normal mood and affect. His behavior is normal.   Vitals reviewed.      Assessment:       1. Overweight (BMI 25.0-29.9)    2. Hyperlipidemia, unspecified hyperlipidemia type    3. History of TIA (transient ischemic attack)    4. Gastroesophageal reflux disease, esophagitis presence not specified    5. Idiopathic gout of knee, unspecified chronicity, unspecified laterality    6. H/O seasonal allergies        Plan:       Janes was seen today for follow-up.    Diagnoses and all orders for this visit:    Overweight (BMI 25.0-29.9)  Diet and exercise discussed  Weight stable    Hyperlipidemia, unspecified hyperlipidemia type  -     pravastatin (PRAVACHOL) 40 MG tablet; Take 1 tablet (40 mg total) by mouth once daily.  Chronic controlled  Repeat labs today  Cont statin pending labs    History of TIA (transient ischemic attack)  -     pravastatin (PRAVACHOL) 40 MG tablet; Take 1 tablet (40 mg total) by mouth once daily.  -     clopidogrel (PLAVIX) 75 mg tablet; Take 1 tablet (75 mg total) by mouth once daily.  Chronic stable  Cont plavix and statin therapy    Gastroesophageal reflux disease, esophagitis presence not specified  -     omeprazole (PRILOSEC) 20 MG capsule; Take 1 capsule (20 mg total) by  mouth once daily.  Chronic ocntrolled  Cont PPI at same dose    Idiopathic gout of knee, unspecified chronicity, unspecified laterality  -     allopurinol (ZYLOPRIM) 300 MG tablet; Take 1 tablet (300 mg total) by mouth once daily.  Chronic controlled  No acute flare  Cont meds at same dose  Repeat UA today    H/O seasonal allergies  -     loratadine (CLARITIN) 10 mg tablet; Take 1 tablet (10 mg total) by mouth once daily.  Chronic controlled  Cont meds at same dose    RTC 6 months and PRN.  Will do labs today. He is not fasting but I don't want his father to have to bring him back. If severe abnormalities noted in cholesterol or glucose will have him repeat fasting

## 2019-02-15 DIAGNOSIS — M10.069 IDIOPATHIC GOUT OF KNEE, UNSPECIFIED CHRONICITY, UNSPECIFIED LATERALITY: ICD-10-CM

## 2019-02-15 RX ORDER — ALLOPURINOL 300 MG/1
TABLET ORAL
Qty: 90 TABLET | Refills: 3 | Status: SHIPPED | OUTPATIENT
Start: 2019-02-15 | End: 2019-02-18 | Stop reason: SDUPTHER

## 2019-02-18 ENCOUNTER — OFFICE VISIT (OUTPATIENT)
Dept: INTERNAL MEDICINE | Facility: CLINIC | Age: 49
End: 2019-02-18
Payer: MEDICARE

## 2019-02-18 VITALS
HEIGHT: 60 IN | WEIGHT: 135.56 LBS | BODY MASS INDEX: 26.61 KG/M2 | DIASTOLIC BLOOD PRESSURE: 80 MMHG | HEART RATE: 88 BPM | RESPIRATION RATE: 16 BRPM | SYSTOLIC BLOOD PRESSURE: 120 MMHG

## 2019-02-18 DIAGNOSIS — K21.9 GASTROESOPHAGEAL REFLUX DISEASE, ESOPHAGITIS PRESENCE NOT SPECIFIED: ICD-10-CM

## 2019-02-18 DIAGNOSIS — E78.5 HYPERLIPIDEMIA, UNSPECIFIED HYPERLIPIDEMIA TYPE: Primary | ICD-10-CM

## 2019-02-18 DIAGNOSIS — Z88.9 H/O SEASONAL ALLERGIES: ICD-10-CM

## 2019-02-18 DIAGNOSIS — M10.069 IDIOPATHIC GOUT OF KNEE, UNSPECIFIED CHRONICITY, UNSPECIFIED LATERALITY: ICD-10-CM

## 2019-02-18 DIAGNOSIS — E66.3 OVERWEIGHT (BMI 25.0-29.9): ICD-10-CM

## 2019-02-18 DIAGNOSIS — Z86.73 HISTORY OF TIA (TRANSIENT ISCHEMIC ATTACK): ICD-10-CM

## 2019-02-18 DIAGNOSIS — Q90.9 DOWN SYNDROME: ICD-10-CM

## 2019-02-18 PROCEDURE — 99999 PR PBB SHADOW E&M-EST. PATIENT-LVL III: CPT | Mod: PBBFAC,,, | Performed by: INTERNAL MEDICINE

## 2019-02-18 PROCEDURE — 3008F PR BODY MASS INDEX (BMI) DOCUMENTED: ICD-10-PCS | Mod: CPTII,S$GLB,, | Performed by: INTERNAL MEDICINE

## 2019-02-18 PROCEDURE — 99214 OFFICE O/P EST MOD 30 MIN: CPT | Mod: S$GLB,,, | Performed by: INTERNAL MEDICINE

## 2019-02-18 PROCEDURE — 99214 PR OFFICE/OUTPT VISIT, EST, LEVL IV, 30-39 MIN: ICD-10-PCS | Mod: S$GLB,,, | Performed by: INTERNAL MEDICINE

## 2019-02-18 PROCEDURE — 3008F BODY MASS INDEX DOCD: CPT | Mod: CPTII,S$GLB,, | Performed by: INTERNAL MEDICINE

## 2019-02-18 PROCEDURE — 99999 PR PBB SHADOW E&M-EST. PATIENT-LVL III: ICD-10-PCS | Mod: PBBFAC,,, | Performed by: INTERNAL MEDICINE

## 2019-02-18 RX ORDER — PROMETHAZINE HYDROCHLORIDE AND CODEINE PHOSPHATE 6.25; 1 MG/5ML; MG/5ML
5 SOLUTION ORAL EVERY 4 HOURS PRN
Qty: 240 ML | Refills: 0 | Status: SHIPPED | OUTPATIENT
Start: 2019-02-18 | End: 2019-02-28

## 2019-02-18 RX ORDER — CLOPIDOGREL BISULFATE 75 MG/1
75 TABLET ORAL DAILY
Qty: 90 TABLET | Refills: 3 | Status: SHIPPED | OUTPATIENT
Start: 2019-02-18 | End: 2020-02-26

## 2019-02-18 RX ORDER — PRAVASTATIN SODIUM 40 MG/1
40 TABLET ORAL DAILY
Qty: 90 TABLET | Refills: 3 | Status: SHIPPED | OUTPATIENT
Start: 2019-02-18 | End: 2020-02-26

## 2019-02-18 RX ORDER — ALLOPURINOL 300 MG/1
300 TABLET ORAL DAILY
Qty: 90 TABLET | Refills: 3 | Status: SHIPPED | OUTPATIENT
Start: 2019-02-18 | End: 2020-02-26

## 2019-02-18 NOTE — PROGRESS NOTES
Subjective:       Patient ID: Janes Strange is a 48 y.o. male.    Chief Complaint: Follow-up (6 mo )      HPI:  Patient is known to me and presents for follow up chronic medical issues. He has Down Syndrome, lives with father. No labs prior to today's visit, wants to do today but he is not fasting.     Gout: on allopurinol. No recent flares. No joint pain or swelling today. Uric acid normal.     Seasonal allergies: well controlled on Claritin. Denies sneezing, itchy eyes, cough, SOB. Uses cough meds PRN as well.      HLD: on pravastatin. Denies side effects from medications. Last LDL down to 120 from 140s.     GERD: on omeprazole. Denies chest pains, abd pain, n/v/d/c. Denies belching.     History of CVA: no residual effects. On plavix therapy. Sounds like might have been TIA. No records to review. Following with Dr. John.    Past Medical History:   Diagnosis Date    Down's syndrome     Seizure        History reviewed. No pertinent family history.    Social History     Socioeconomic History    Marital status: Single     Spouse name: Not on file    Number of children: Not on file    Years of education: Not on file    Highest education level: Not on file   Social Needs    Financial resource strain: Not on file    Food insecurity - worry: Not on file    Food insecurity - inability: Not on file    Transportation needs - medical: Not on file    Transportation needs - non-medical: Not on file   Occupational History    Not on file   Tobacco Use    Smoking status: Never Smoker    Smokeless tobacco: Never Used   Substance and Sexual Activity    Alcohol use: No    Drug use: No    Sexual activity: No   Other Topics Concern    Not on file   Social History Narrative    Not on file       Review of Systems   Constitutional: Negative for activity change, fatigue, fever and unexpected weight change.   HENT: Negative for congestion, ear pain, hearing loss, rhinorrhea and sore throat.    Eyes: Negative for  redness and visual disturbance.   Respiratory: Negative for cough, shortness of breath and wheezing.    Cardiovascular: Negative for chest pain, palpitations and leg swelling.   Gastrointestinal: Negative for abdominal pain, constipation, diarrhea, nausea and vomiting.   Genitourinary: Negative for decreased urine volume, dysuria, frequency and urgency.   Musculoskeletal: Negative for back pain, joint swelling and neck pain.   Skin: Negative for color change, rash and wound.   Neurological: Negative for dizziness, tremors, weakness, light-headedness and headaches.         Objective:      Physical Exam   Constitutional: He is oriented to person, place, and time. He appears well-developed and well-nourished. No distress.   HENT:   Head: Normocephalic and atraumatic.   Right Ear: External ear normal.   Left Ear: External ear normal.   Eyes: Conjunctivae and EOM are normal. Pupils are equal, round, and reactive to light. Right eye exhibits no discharge. Left eye exhibits no discharge.   Neck: Neck supple. No tracheal deviation present.   Cardiovascular: Normal rate and regular rhythm.   No murmur heard.  Pulmonary/Chest: Effort normal and breath sounds normal. No respiratory distress. He has no wheezes. He has no rales.   Abdominal: Soft. Bowel sounds are normal. He exhibits no distension. There is no tenderness.   Neurological: He is alert and oriented to person, place, and time. No cranial nerve deficit.   Skin: Skin is warm and dry.   Psychiatric: He has a normal mood and affect. His behavior is normal.   Vitals reviewed.      Assessment:       1. Hyperlipidemia, unspecified hyperlipidemia type    2. History of TIA (transient ischemic attack)    3. Overweight (BMI 25.0-29.9)    4. Gastroesophageal reflux disease, esophagitis presence not specified    5. Idiopathic gout of knee, unspecified chronicity, unspecified laterality    6. H/O seasonal allergies    7. Down syndrome        Plan:       Janes was seen today for  follow-up.    Diagnoses and all orders for this visit:    Hyperlipidemia, unspecified hyperlipidemia type  -     CBC auto differential; Future  -     Comprehensive metabolic panel; Future  -     Lipid panel; Future  -     pravastatin (PRAVACHOL) 40 MG tablet; Take 1 tablet (40 mg total) by mouth once daily.  Chronic controlled  Last labs weren't fasting  Repeat next visit  Cont statin  Diet and exercise    History of TIA (transient ischemic attack)  -     CBC auto differential; Future  -     Comprehensive metabolic panel; Future  -     Lipid panel; Future  -     pravastatin (PRAVACHOL) 40 MG tablet; Take 1 tablet (40 mg total) by mouth once daily.  -     clopidogrel (PLAVIX) 75 mg tablet; Take 1 tablet (75 mg total) by mouth once daily.  No residual sx  Cont to control risk factors  Cont statin and plavix    Overweight (BMI 25.0-29.9)  -     CBC auto differential; Future  -     Comprehensive metabolic panel; Future  -     Lipid panel; Future  Diet and exercise    Gastroesophageal reflux disease, esophagitis presence not specified  -     CBC auto differential; Future  -     Comprehensive metabolic panel; Future  -     Lipid panel; Future  Chronic stable  Cont PPT same dose    Idiopathic gout of knee, unspecified chronicity, unspecified laterality  -     CBC auto differential; Future  -     Comprehensive metabolic panel; Future  -     Uric acid; Future  -     allopurinol (ZYLOPRIM) 300 MG tablet; Take 1 tablet (300 mg total) by mouth once daily.  Chronic stable  No flares  Cont allorpuinol same dose    H/O seasonal allergies  -     promethazine-codeine 6.25-10 mg/5 ml (PHENERGAN WITH CODEINE) 6.25-10 mg/5 mL syrup; Take 5 mLs by mouth every 4 (four) hours as needed.  Chronic stable  Cont meds, claritin    Down syndrome  Lives with father who is his caretaker    RTC 6 months with labs and PRN

## 2019-02-18 NOTE — PATIENT INSTRUCTIONS

## 2019-03-04 RX ORDER — TIZANIDINE 4 MG/1
TABLET ORAL
Refills: 0 | OUTPATIENT
Start: 2019-03-04

## 2019-04-01 DIAGNOSIS — J20.8 ACUTE BACTERIAL BRONCHITIS: ICD-10-CM

## 2019-04-01 DIAGNOSIS — B96.89 ACUTE BACTERIAL BRONCHITIS: ICD-10-CM

## 2019-04-01 RX ORDER — ALBUTEROL SULFATE 0.83 MG/ML
2.5 SOLUTION RESPIRATORY (INHALATION) EVERY 6 HOURS PRN
Qty: 90 ML | Refills: 1 | Status: SHIPPED | OUTPATIENT
Start: 2019-04-01 | End: 2019-12-04 | Stop reason: SDUPTHER

## 2019-08-07 ENCOUNTER — PATIENT OUTREACH (OUTPATIENT)
Dept: ADMINISTRATIVE | Facility: HOSPITAL | Age: 49
End: 2019-08-07

## 2019-08-19 ENCOUNTER — OFFICE VISIT (OUTPATIENT)
Dept: INTERNAL MEDICINE | Facility: CLINIC | Age: 49
End: 2019-08-19
Payer: MEDICARE

## 2019-08-19 VITALS
WEIGHT: 134.5 LBS | HEART RATE: 94 BPM | SYSTOLIC BLOOD PRESSURE: 120 MMHG | DIASTOLIC BLOOD PRESSURE: 82 MMHG | RESPIRATION RATE: 18 BRPM | HEIGHT: 60 IN | BODY MASS INDEX: 26.41 KG/M2

## 2019-08-19 DIAGNOSIS — E66.3 OVERWEIGHT (BMI 25.0-29.9): ICD-10-CM

## 2019-08-19 DIAGNOSIS — M10.069 IDIOPATHIC GOUT OF KNEE, UNSPECIFIED CHRONICITY, UNSPECIFIED LATERALITY: ICD-10-CM

## 2019-08-19 DIAGNOSIS — Z86.73 HISTORY OF TIA (TRANSIENT ISCHEMIC ATTACK): ICD-10-CM

## 2019-08-19 DIAGNOSIS — Q90.9 DOWN SYNDROME: ICD-10-CM

## 2019-08-19 DIAGNOSIS — E78.5 HYPERLIPIDEMIA, UNSPECIFIED HYPERLIPIDEMIA TYPE: Primary | ICD-10-CM

## 2019-08-19 PROCEDURE — 99999 PR PBB SHADOW E&M-EST. PATIENT-LVL III: ICD-10-PCS | Mod: PBBFAC,,, | Performed by: INTERNAL MEDICINE

## 2019-08-19 PROCEDURE — 3008F BODY MASS INDEX DOCD: CPT | Mod: CPTII,S$GLB,, | Performed by: INTERNAL MEDICINE

## 2019-08-19 PROCEDURE — 99499 RISK ADDL DX/OHS AUDIT: ICD-10-PCS | Mod: S$GLB,,, | Performed by: INTERNAL MEDICINE

## 2019-08-19 PROCEDURE — 99499 UNLISTED E&M SERVICE: CPT | Mod: S$GLB,,, | Performed by: INTERNAL MEDICINE

## 2019-08-19 PROCEDURE — 99214 PR OFFICE/OUTPT VISIT, EST, LEVL IV, 30-39 MIN: ICD-10-PCS | Mod: S$GLB,,, | Performed by: INTERNAL MEDICINE

## 2019-08-19 PROCEDURE — 99214 OFFICE O/P EST MOD 30 MIN: CPT | Mod: S$GLB,,, | Performed by: INTERNAL MEDICINE

## 2019-08-19 PROCEDURE — 99999 PR PBB SHADOW E&M-EST. PATIENT-LVL III: CPT | Mod: PBBFAC,,, | Performed by: INTERNAL MEDICINE

## 2019-08-19 PROCEDURE — 3008F PR BODY MASS INDEX (BMI) DOCUMENTED: ICD-10-PCS | Mod: CPTII,S$GLB,, | Performed by: INTERNAL MEDICINE

## 2019-08-19 NOTE — PROGRESS NOTES
Subjective:       Patient ID: Janes Strange is a 49 y.o. male.    Chief Complaint: Follow-up (6 mo)      HPI:  Patient is known to me and presents for follow up chronic medical issues. He has Down Syndrome, lives with father. No labs prior to today's visit, will do this Friday     Gout: on allopurinol. No recent flares. No joint pain or swelling today. Uric acid normal-due for repeat labs.     Seasonal allergies: well controlled on Claritin. Denies sneezing, itchy eyes, cough, SOB. Uses cough meds PRN as well.      HLD: on pravastatin. Denies side effects from medications. Last LDL down to 120 from 140s--due for repeat labs     GERD: on omeprazole. Denies chest pains, abd pain, n/v/d/c. Denies belching.     History of CVA: no residual effects. On plavix therapy. Sounds like might have been TIA. No records to review. Following with Dr. John.    Past Medical History:   Diagnosis Date    Down's syndrome     Seizure        History reviewed. No pertinent family history.    Social History     Socioeconomic History    Marital status: Single     Spouse name: Not on file    Number of children: Not on file    Years of education: Not on file    Highest education level: Not on file   Occupational History    Not on file   Social Needs    Financial resource strain: Not on file    Food insecurity:     Worry: Not on file     Inability: Not on file    Transportation needs:     Medical: Not on file     Non-medical: Not on file   Tobacco Use    Smoking status: Never Smoker    Smokeless tobacco: Never Used   Substance and Sexual Activity    Alcohol use: No    Drug use: No    Sexual activity: Never   Lifestyle    Physical activity:     Days per week: Not on file     Minutes per session: Not on file    Stress: Not on file   Relationships    Social connections:     Talks on phone: Not on file     Gets together: Not on file     Attends Evangelical service: Not on file     Active member of club or organization: Not on  file     Attends meetings of clubs or organizations: Not on file     Relationship status: Not on file   Other Topics Concern    Not on file   Social History Narrative    Not on file       Review of Systems   Unable to perform ROS: Patient nonverbal         Objective:      Physical Exam   Constitutional: He is oriented to person, place, and time. He appears well-developed and well-nourished. No distress.   HENT:   Head: Normocephalic and atraumatic.   Right Ear: External ear normal.   Left Ear: External ear normal.   Eyes: EOM are normal. Right eye exhibits no discharge. Left eye exhibits no discharge. No scleral icterus.   Neck: Neck supple. No thyromegaly present.   Cardiovascular: Normal rate and regular rhythm. Exam reveals no gallop and no friction rub.   No murmur heard.  Pulmonary/Chest: Effort normal and breath sounds normal. No respiratory distress. He has no wheezes. He has no rales.   Abdominal: Soft. Bowel sounds are normal. He exhibits no distension. There is no tenderness. There is no rebound and no guarding.   Lymphadenopathy:     He has no cervical adenopathy.   Neurological: He is alert and oriented to person, place, and time. No cranial nerve deficit.   Skin: Skin is warm and dry.   Psychiatric: He has a normal mood and affect. His behavior is normal.   Vitals reviewed.      Assessment:       1. Hyperlipidemia, unspecified hyperlipidemia type    2. History of TIA (transient ischemic attack)    3. Idiopathic gout of knee, unspecified chronicity, unspecified laterality    4. Down syndrome    5. Overweight (BMI 25.0-29.9)        Plan:       Janes was seen today for follow-up.    Diagnoses and all orders for this visit:    Hyperlipidemia, unspecified hyperlipidemia type    History of TIA (transient ischemic attack)    Idiopathic gout of knee, unspecified chronicity, unspecified laterality    Down syndrome    Overweight (BMI 25.0-29.9)      Will update his fasting labs this week  Continue all meds same  dose pending fasting labs  No acute complaints today  HM up to date, reviewed today pending influenza vaccine availability. Discussed should get flu shot this year, voiced understanding      RTC 6 months and PRN pending fasting labs

## 2019-08-22 ENCOUNTER — LAB VISIT (OUTPATIENT)
Dept: LAB | Facility: HOSPITAL | Age: 49
End: 2019-08-22
Attending: INTERNAL MEDICINE
Payer: MEDICARE

## 2019-08-22 DIAGNOSIS — E66.3 OVERWEIGHT (BMI 25.0-29.9): ICD-10-CM

## 2019-08-22 DIAGNOSIS — K21.9 GASTROESOPHAGEAL REFLUX DISEASE, ESOPHAGITIS PRESENCE NOT SPECIFIED: ICD-10-CM

## 2019-08-22 DIAGNOSIS — E78.5 HYPERLIPIDEMIA, UNSPECIFIED HYPERLIPIDEMIA TYPE: ICD-10-CM

## 2019-08-22 DIAGNOSIS — M10.069 IDIOPATHIC GOUT OF KNEE, UNSPECIFIED CHRONICITY, UNSPECIFIED LATERALITY: ICD-10-CM

## 2019-08-22 DIAGNOSIS — Z86.73 HISTORY OF TIA (TRANSIENT ISCHEMIC ATTACK): ICD-10-CM

## 2019-08-22 LAB
ALBUMIN SERPL BCP-MCNC: 3.7 G/DL (ref 3.5–5.2)
ALP SERPL-CCNC: 89 U/L (ref 55–135)
ALT SERPL W/O P-5'-P-CCNC: 20 U/L (ref 10–44)
ANION GAP SERPL CALC-SCNC: 12 MMOL/L (ref 8–16)
AST SERPL-CCNC: 21 U/L (ref 10–40)
BASOPHILS # BLD AUTO: 0.2 K/UL (ref 0–0.2)
BASOPHILS NFR BLD: 1.5 % (ref 0–1.9)
BILIRUB SERPL-MCNC: 0.5 MG/DL (ref 0.1–1)
BUN SERPL-MCNC: 15 MG/DL (ref 6–20)
CALCIUM SERPL-MCNC: 10.2 MG/DL (ref 8.7–10.5)
CHLORIDE SERPL-SCNC: 102 MMOL/L (ref 95–110)
CHOLEST SERPL-MCNC: 239 MG/DL (ref 120–199)
CHOLEST/HDLC SERPL: 5.4 {RATIO} (ref 2–5)
CO2 SERPL-SCNC: 27 MMOL/L (ref 23–29)
CREAT SERPL-MCNC: 1.1 MG/DL (ref 0.5–1.4)
DIFFERENTIAL METHOD: ABNORMAL
EOSINOPHIL # BLD AUTO: 0.1 K/UL (ref 0–0.5)
EOSINOPHIL NFR BLD: 0.6 % (ref 0–8)
ERYTHROCYTE [DISTWIDTH] IN BLOOD BY AUTOMATED COUNT: 14.8 % (ref 11.5–14.5)
EST. GFR  (AFRICAN AMERICAN): >60 ML/MIN/1.73 M^2
EST. GFR  (NON AFRICAN AMERICAN): >60 ML/MIN/1.73 M^2
GLUCOSE SERPL-MCNC: 100 MG/DL (ref 70–110)
HCT VFR BLD AUTO: 49.3 % (ref 40–54)
HDLC SERPL-MCNC: 44 MG/DL (ref 40–75)
HDLC SERPL: 18.4 % (ref 20–50)
HGB BLD-MCNC: 15.9 G/DL (ref 14–18)
IMM GRANULOCYTES # BLD AUTO: 0.06 K/UL (ref 0–0.04)
IMM GRANULOCYTES NFR BLD AUTO: 0.5 % (ref 0–0.5)
LDLC SERPL CALC-MCNC: 146.8 MG/DL (ref 63–159)
LYMPHOCYTES # BLD AUTO: 4.6 K/UL (ref 1–4.8)
LYMPHOCYTES NFR BLD: 35.8 % (ref 18–48)
MCH RBC QN AUTO: 29.5 PG (ref 27–31)
MCHC RBC AUTO-ENTMCNC: 32.3 G/DL (ref 32–36)
MCV RBC AUTO: 92 FL (ref 82–98)
MONOCYTES # BLD AUTO: 0.8 K/UL (ref 0.3–1)
MONOCYTES NFR BLD: 5.9 % (ref 4–15)
NEUTROPHILS # BLD AUTO: 7.2 K/UL (ref 1.8–7.7)
NEUTROPHILS NFR BLD: 55.7 % (ref 38–73)
NONHDLC SERPL-MCNC: 195 MG/DL
NRBC BLD-RTO: 0 /100 WBC
PLATELET # BLD AUTO: 321 K/UL (ref 150–350)
PMV BLD AUTO: 9.8 FL (ref 9.2–12.9)
POTASSIUM SERPL-SCNC: 4.3 MMOL/L (ref 3.5–5.1)
PROT SERPL-MCNC: 7.8 G/DL (ref 6–8.4)
RBC # BLD AUTO: 5.39 M/UL (ref 4.6–6.2)
SODIUM SERPL-SCNC: 141 MMOL/L (ref 136–145)
TRIGL SERPL-MCNC: 241 MG/DL (ref 30–150)
URATE SERPL-MCNC: 4.6 MG/DL (ref 3.4–7)
WBC # BLD AUTO: 12.97 K/UL (ref 3.9–12.7)

## 2019-08-22 PROCEDURE — 36415 COLL VENOUS BLD VENIPUNCTURE: CPT

## 2019-08-22 PROCEDURE — 80061 LIPID PANEL: CPT

## 2019-08-22 PROCEDURE — 84550 ASSAY OF BLOOD/URIC ACID: CPT

## 2019-08-22 PROCEDURE — 80053 COMPREHEN METABOLIC PANEL: CPT

## 2019-08-22 PROCEDURE — 85025 COMPLETE CBC W/AUTO DIFF WBC: CPT

## 2019-09-27 DIAGNOSIS — K21.9 GASTROESOPHAGEAL REFLUX DISEASE, ESOPHAGITIS PRESENCE NOT SPECIFIED: ICD-10-CM

## 2019-09-27 RX ORDER — OMEPRAZOLE 20 MG/1
CAPSULE, DELAYED RELEASE ORAL
Qty: 90 CAPSULE | Refills: 3 | Status: SHIPPED | OUTPATIENT
Start: 2019-09-27 | End: 2020-08-24

## 2019-11-22 ENCOUNTER — OFFICE VISIT (OUTPATIENT)
Dept: INTERNAL MEDICINE | Facility: CLINIC | Age: 49
End: 2019-11-22
Payer: MEDICARE

## 2019-11-22 ENCOUNTER — TELEPHONE (OUTPATIENT)
Dept: INTERNAL MEDICINE | Facility: CLINIC | Age: 49
End: 2019-11-22

## 2019-11-22 ENCOUNTER — HOSPITAL ENCOUNTER (OUTPATIENT)
Dept: RADIOLOGY | Facility: HOSPITAL | Age: 49
Discharge: HOME | End: 2019-11-22
Attending: INTERNAL MEDICINE
Payer: MEDICARE

## 2019-11-22 VITALS
RESPIRATION RATE: 18 BRPM | SYSTOLIC BLOOD PRESSURE: 118 MMHG | BODY MASS INDEX: 26.19 KG/M2 | HEIGHT: 60 IN | DIASTOLIC BLOOD PRESSURE: 78 MMHG | TEMPERATURE: 99 F | HEART RATE: 99 BPM | OXYGEN SATURATION: 91 % | WEIGHT: 133.38 LBS

## 2019-11-22 DIAGNOSIS — J20.8 ACUTE BACTERIAL BRONCHITIS: ICD-10-CM

## 2019-11-22 DIAGNOSIS — J20.8 ACUTE BACTERIAL BRONCHITIS: Primary | ICD-10-CM

## 2019-11-22 DIAGNOSIS — B96.89 ACUTE BACTERIAL BRONCHITIS: ICD-10-CM

## 2019-11-22 DIAGNOSIS — B96.89 ACUTE BACTERIAL BRONCHITIS: Primary | ICD-10-CM

## 2019-11-22 LAB
CTP QC/QA: YES
POC MOLECULAR INFLUENZA A AGN: NEGATIVE
POC MOLECULAR INFLUENZA B AGN: NEGATIVE

## 2019-11-22 PROCEDURE — 99213 PR OFFICE/OUTPT VISIT, EST, LEVL III, 20-29 MIN: ICD-10-PCS | Mod: S$GLB,,, | Performed by: INTERNAL MEDICINE

## 2019-11-22 PROCEDURE — 71046 XR CHEST PA AND LATERAL: ICD-10-PCS | Mod: 26,,, | Performed by: RADIOLOGY

## 2019-11-22 PROCEDURE — 99999 PR PBB SHADOW E&M-EST. PATIENT-LVL III: CPT | Mod: PBBFAC,,, | Performed by: INTERNAL MEDICINE

## 2019-11-22 PROCEDURE — 87502 POCT INFLUENZA A/B MOLECULAR: ICD-10-PCS | Mod: QW,S$GLB,, | Performed by: INTERNAL MEDICINE

## 2019-11-22 PROCEDURE — 71046 X-RAY EXAM CHEST 2 VIEWS: CPT | Mod: TC

## 2019-11-22 PROCEDURE — 3008F BODY MASS INDEX DOCD: CPT | Mod: CPTII,S$GLB,, | Performed by: INTERNAL MEDICINE

## 2019-11-22 PROCEDURE — 71046 X-RAY EXAM CHEST 2 VIEWS: CPT | Mod: 26,,, | Performed by: RADIOLOGY

## 2019-11-22 PROCEDURE — 3008F PR BODY MASS INDEX (BMI) DOCUMENTED: ICD-10-PCS | Mod: CPTII,S$GLB,, | Performed by: INTERNAL MEDICINE

## 2019-11-22 PROCEDURE — 99213 OFFICE O/P EST LOW 20 MIN: CPT | Mod: S$GLB,,, | Performed by: INTERNAL MEDICINE

## 2019-11-22 PROCEDURE — 99999 PR PBB SHADOW E&M-EST. PATIENT-LVL III: ICD-10-PCS | Mod: PBBFAC,,, | Performed by: INTERNAL MEDICINE

## 2019-11-22 PROCEDURE — 87502 INFLUENZA DNA AMP PROBE: CPT | Mod: QW,S$GLB,, | Performed by: INTERNAL MEDICINE

## 2019-11-22 RX ORDER — AZITHROMYCIN 250 MG/1
TABLET, FILM COATED ORAL
Qty: 6 TABLET | Refills: 0 | Status: SHIPPED | OUTPATIENT
Start: 2019-11-22 | End: 2019-12-04

## 2019-11-22 RX ORDER — CODEINE PHOSPHATE AND GUAIFENESIN 10; 100 MG/5ML; MG/5ML
10 SOLUTION ORAL EVERY 6 HOURS PRN
Qty: 120 ML | Refills: 0 | Status: SHIPPED | OUTPATIENT
Start: 2019-11-22 | End: 2019-12-02

## 2019-11-22 NOTE — PROGRESS NOTES
Subjective:       Patient ID: Janes Strange is a 49 y.o. male.    Chief Complaint: Cough and Nasal Congestion      HPI:  Patient is known to me and presents with cough and congestion. Sx started 5 days ago. He was taking old script virtussin which was working well, ran out. Cough is productive. No SOB. No known fevers. History provided by father as patient has down's syndrome and can't provide much history. Father is also sick with similar sx.     Past Medical History:   Diagnosis Date    Down's syndrome     Seizure        History reviewed. No pertinent family history.    Social History     Socioeconomic History    Marital status: Single     Spouse name: Not on file    Number of children: Not on file    Years of education: Not on file    Highest education level: Not on file   Occupational History    Not on file   Social Needs    Financial resource strain: Not on file    Food insecurity:     Worry: Not on file     Inability: Not on file    Transportation needs:     Medical: Not on file     Non-medical: Not on file   Tobacco Use    Smoking status: Never Smoker    Smokeless tobacco: Never Used   Substance and Sexual Activity    Alcohol use: No    Drug use: No    Sexual activity: Never   Lifestyle    Physical activity:     Days per week: Not on file     Minutes per session: Not on file    Stress: Not on file   Relationships    Social connections:     Talks on phone: Not on file     Gets together: Not on file     Attends Caodaism service: Not on file     Active member of club or organization: Not on file     Attends meetings of clubs or organizations: Not on file     Relationship status: Not on file   Other Topics Concern    Not on file   Social History Narrative    Not on file       Review of Systems   Constitutional: Negative for activity change, fatigue, fever and unexpected weight change.   HENT: Positive for congestion. Negative for ear pain, hearing loss, rhinorrhea and sore throat.    Eyes:  Negative for redness and visual disturbance.   Respiratory: Positive for cough. Negative for shortness of breath and wheezing.    Cardiovascular: Negative for chest pain, palpitations and leg swelling.   Gastrointestinal: Negative for abdominal pain, constipation, diarrhea, nausea and vomiting.   Genitourinary: Negative for decreased urine volume, dysuria, frequency and urgency.   Musculoskeletal: Negative for back pain, joint swelling and neck pain.   Skin: Negative for color change, rash and wound.   Neurological: Negative for dizziness, tremors, weakness, light-headedness and headaches.         Objective:      Physical Exam   Constitutional: He is oriented to person, place, and time. He appears well-developed and well-nourished. No distress.   HENT:   Head: Normocephalic and atraumatic.   Right Ear: External ear normal.   Left Ear: External ear normal.   Eyes: Pupils are equal, round, and reactive to light. Conjunctivae and EOM are normal. Right eye exhibits no discharge. Left eye exhibits no discharge.   Neck: Neck supple. No tracheal deviation present.   Cardiovascular: Normal rate and regular rhythm.   No murmur heard.  Pulmonary/Chest: Effort normal. No respiratory distress. He has wheezes (ad rhonchi right lung base).   Abdominal: Soft. Bowel sounds are normal. He exhibits no distension. There is no tenderness.   Neurological: He is alert and oriented to person, place, and time. No cranial nerve deficit.   Skin: Skin is warm and dry.   Psychiatric: He has a normal mood and affect. His behavior is normal.   Vitals reviewed.      Assessment:       1. Acute bacterial bronchitis        Plan:       Janes was seen today for cough and nasal congestion.    Diagnoses and all orders for this visit:    Acute bacterial bronchitis  -     X-Ray Chest PA And Lateral; Future  -     guaifenesin-codeine 100-10 mg/5 ml (TUSSI-ORGANIDIN NR)  mg/5 mL syrup; Take 10 mLs by mouth every 6 (six) hours as needed for Cough.  -      azithromycin (Z-DOMINIC) 250 MG tablet; Two today , then 1 daily  -     POCT Influenza A/B Molecular     flu swab negative  Check xray to ensure no developing consolidation with sats 91%. He does not appear to be SOB and no distress today in clinic  Start antibx: warranted with lung sounds and 91% sats  Refilled cough meds  Call if worsening sx or fever > 101 F

## 2019-11-22 NOTE — TELEPHONE ENCOUNTER
----- Message from Radha Ryan sent at 2019 10:47 AM CST -----  Contact: Rolando/Father  Janes Strange  MRN: [id number]  : 1970  PCP: Angie Bain  Home Phone      [phone]  Work Phone      Not on file.  Mobile          [phone]    MESSAGE:     Requesting appointment to see Dr. Bain for cough and congestion that he's been having since Monday. Please call if we can fit into schedule today or Monday.    Phone: [phone]

## 2019-12-04 ENCOUNTER — OFFICE VISIT (OUTPATIENT)
Dept: INTERNAL MEDICINE | Facility: CLINIC | Age: 49
End: 2019-12-04
Payer: MEDICARE

## 2019-12-04 ENCOUNTER — TELEPHONE (OUTPATIENT)
Dept: INTERNAL MEDICINE | Facility: CLINIC | Age: 49
End: 2019-12-04

## 2019-12-04 VITALS
SYSTOLIC BLOOD PRESSURE: 118 MMHG | HEART RATE: 102 BPM | DIASTOLIC BLOOD PRESSURE: 84 MMHG | HEIGHT: 60 IN | OXYGEN SATURATION: 93 % | BODY MASS INDEX: 26.05 KG/M2 | RESPIRATION RATE: 20 BRPM | WEIGHT: 132.69 LBS

## 2019-12-04 DIAGNOSIS — J20.8 ACUTE BACTERIAL BRONCHITIS: Primary | ICD-10-CM

## 2019-12-04 DIAGNOSIS — B96.89 ACUTE BACTERIAL BRONCHITIS: Primary | ICD-10-CM

## 2019-12-04 PROCEDURE — 99213 PR OFFICE/OUTPT VISIT, EST, LEVL III, 20-29 MIN: ICD-10-PCS | Mod: S$GLB,,, | Performed by: INTERNAL MEDICINE

## 2019-12-04 PROCEDURE — 99999 PR PBB SHADOW E&M-EST. PATIENT-LVL III: CPT | Mod: PBBFAC,,, | Performed by: INTERNAL MEDICINE

## 2019-12-04 PROCEDURE — 99213 OFFICE O/P EST LOW 20 MIN: CPT | Mod: S$GLB,,, | Performed by: INTERNAL MEDICINE

## 2019-12-04 PROCEDURE — 3008F BODY MASS INDEX DOCD: CPT | Mod: CPTII,S$GLB,, | Performed by: INTERNAL MEDICINE

## 2019-12-04 PROCEDURE — 3008F PR BODY MASS INDEX (BMI) DOCUMENTED: ICD-10-PCS | Mod: CPTII,S$GLB,, | Performed by: INTERNAL MEDICINE

## 2019-12-04 PROCEDURE — 99999 PR PBB SHADOW E&M-EST. PATIENT-LVL III: ICD-10-PCS | Mod: PBBFAC,,, | Performed by: INTERNAL MEDICINE

## 2019-12-04 RX ORDER — ALBUTEROL SULFATE 0.83 MG/ML
2.5 SOLUTION RESPIRATORY (INHALATION) EVERY 6 HOURS PRN
Qty: 90 ML | Refills: 1 | Status: SHIPPED | OUTPATIENT
Start: 2019-12-04 | End: 2024-01-31

## 2019-12-04 RX ORDER — CODEINE PHOSPHATE AND GUAIFENESIN 10; 100 MG/5ML; MG/5ML
10 SOLUTION ORAL EVERY 6 HOURS PRN
Qty: 120 ML | Refills: 0 | Status: SHIPPED | OUTPATIENT
Start: 2019-12-04 | End: 2019-12-14

## 2019-12-04 NOTE — PROGRESS NOTES
Subjective:       Patient ID: Janes Strange is a 49 y.o. male.    Chief Complaint: Cough      HPI:  Patient is known to me and presents with cough. Saw me 11/22/19 and CXR showed a possible developing infiltrate in RLL; lung exam correlated with rhonhi in this area. Given azithromycin. Today, he is still having a cough. It is better. No more fevers. No SOB or wheezing. The vitussin AC was working well; would like a refill. Using albuterol neb BID which is also helping.     Past Medical History:   Diagnosis Date    Down's syndrome     Seizure        History reviewed. No pertinent family history.    Social History     Socioeconomic History    Marital status: Single     Spouse name: Not on file    Number of children: Not on file    Years of education: Not on file    Highest education level: Not on file   Occupational History    Not on file   Social Needs    Financial resource strain: Not on file    Food insecurity:     Worry: Not on file     Inability: Not on file    Transportation needs:     Medical: Not on file     Non-medical: Not on file   Tobacco Use    Smoking status: Never Smoker    Smokeless tobacco: Never Used   Substance and Sexual Activity    Alcohol use: No    Drug use: No    Sexual activity: Never   Lifestyle    Physical activity:     Days per week: Not on file     Minutes per session: Not on file    Stress: Not on file   Relationships    Social connections:     Talks on phone: Not on file     Gets together: Not on file     Attends Christian service: Not on file     Active member of club or organization: Not on file     Attends meetings of clubs or organizations: Not on file     Relationship status: Not on file   Other Topics Concern    Not on file   Social History Narrative    Not on file       Review of Systems   Unable to perform ROS: Patient nonverbal         Objective:      Physical Exam   Constitutional: He is oriented to person, place, and time. He appears well-developed and  well-nourished. No distress.   HENT:   Head: Normocephalic and atraumatic.   Right Ear: External ear normal.   Left Ear: External ear normal.   Eyes: Pupils are equal, round, and reactive to light. Conjunctivae and EOM are normal. Right eye exhibits no discharge. Left eye exhibits no discharge.   Neck: Neck supple. No tracheal deviation present. No thyromegaly present.   Cardiovascular: Normal rate and regular rhythm.   No murmur heard.  Pulmonary/Chest: Effort normal and breath sounds normal. No respiratory distress. He has no wheezes. He has no rales.   Abdominal: Soft. Bowel sounds are normal. He exhibits no distension. There is no tenderness.   Neurological: He is alert and oriented to person, place, and time. No cranial nerve deficit.   Skin: Skin is warm and dry.   Psychiatric: He has a normal mood and affect. His behavior is normal.   Vitals reviewed.      Assessment:       1. Acute bacterial bronchitis        Plan:       Janes was seen today for cough.    Diagnoses and all orders for this visit:    Acute bacterial bronchitis  -     albuterol (PROVENTIL) 2.5 mg /3 mL (0.083 %) nebulizer solution; Take 3 mLs (2.5 mg total) by nebulization every 6 (six) hours as needed for Wheezing. Rescue  -     guaifenesin-codeine 100-10 mg/5 ml (TUSSI-ORGANIDIN NR)  mg/5 mL syrup; Take 10 mLs by mouth every 6 (six) hours as needed for Cough.    cough improving  Lung exam is now normal  No fevers  No further antibx needed  Can cont PRN nebs and cough meds  Cough may linger for a few weeks. If worsening again call me ASAP

## 2019-12-04 NOTE — TELEPHONE ENCOUNTER
----- Message from Radha Ryan sent at 2019  9:03 AM CST -----  Contact: Maura/Father  Janes Stragne  MRN: [id number]  : 1970  PCP: Angie Bain  Home Phone      [phone]  Work Phone      Not on file.  Mobile          [phone]    MESSAGE:     Requesting appointment today to come back in to see Dr. Bain because he is still coughing a lot since his last visit. Please call to advise.    Phone: [phone]

## 2020-02-21 DIAGNOSIS — E78.5 HYPERLIPIDEMIA, UNSPECIFIED HYPERLIPIDEMIA TYPE: ICD-10-CM

## 2020-02-21 DIAGNOSIS — Z86.73 HISTORY OF TIA (TRANSIENT ISCHEMIC ATTACK): ICD-10-CM

## 2020-02-21 DIAGNOSIS — M10.069 IDIOPATHIC GOUT OF KNEE, UNSPECIFIED CHRONICITY, UNSPECIFIED LATERALITY: ICD-10-CM

## 2020-02-27 RX ORDER — CLOPIDOGREL BISULFATE 75 MG/1
TABLET ORAL
Qty: 30 TABLET | Refills: 1 | Status: SHIPPED | OUTPATIENT
Start: 2020-02-27 | End: 2020-04-24

## 2020-02-27 RX ORDER — PRAVASTATIN SODIUM 40 MG/1
TABLET ORAL
Qty: 30 TABLET | Refills: 1 | Status: SHIPPED | OUTPATIENT
Start: 2020-02-27 | End: 2020-03-24

## 2020-02-27 RX ORDER — ALLOPURINOL 300 MG/1
TABLET ORAL
Qty: 30 TABLET | Refills: 1 | Status: SHIPPED | OUTPATIENT
Start: 2020-02-27 | End: 2020-04-24

## 2020-03-24 DIAGNOSIS — E78.5 HYPERLIPIDEMIA, UNSPECIFIED HYPERLIPIDEMIA TYPE: ICD-10-CM

## 2020-03-24 DIAGNOSIS — Z86.73 HISTORY OF TIA (TRANSIENT ISCHEMIC ATTACK): ICD-10-CM

## 2020-03-24 RX ORDER — PRAVASTATIN SODIUM 40 MG/1
TABLET ORAL
Qty: 30 TABLET | Refills: 0 | Status: SHIPPED | OUTPATIENT
Start: 2020-03-24 | End: 2020-05-26 | Stop reason: SDUPTHER

## 2020-04-23 DIAGNOSIS — Z86.73 HISTORY OF TIA (TRANSIENT ISCHEMIC ATTACK): ICD-10-CM

## 2020-04-23 DIAGNOSIS — M10.069 IDIOPATHIC GOUT OF KNEE, UNSPECIFIED CHRONICITY, UNSPECIFIED LATERALITY: ICD-10-CM

## 2020-04-24 RX ORDER — ALLOPURINOL 300 MG/1
TABLET ORAL
Qty: 30 TABLET | Refills: 5 | Status: SHIPPED | OUTPATIENT
Start: 2020-04-24 | End: 2020-05-26 | Stop reason: SDUPTHER

## 2020-04-24 RX ORDER — CLOPIDOGREL BISULFATE 75 MG/1
TABLET ORAL
Qty: 30 TABLET | Refills: 5 | Status: SHIPPED | OUTPATIENT
Start: 2020-04-24 | End: 2020-05-26 | Stop reason: SDUPTHER

## 2020-04-27 ENCOUNTER — TELEPHONE (OUTPATIENT)
Dept: INTERNAL MEDICINE | Facility: CLINIC | Age: 50
End: 2020-04-27

## 2020-04-27 RX ORDER — MUPIROCIN 20 MG/G
OINTMENT TOPICAL 3 TIMES DAILY
Qty: 1 TUBE | Refills: 1 | Status: SHIPPED | OUTPATIENT
Start: 2020-04-27 | End: 2020-05-26

## 2020-04-27 NOTE — TELEPHONE ENCOUNTER
I did tell him that; got busy and forgot to do it! So sorry!    Sent in bactroban cream to use on Mr. Marrero's left calf for a mild skin infection

## 2020-05-15 DIAGNOSIS — Z12.11 COLON CANCER SCREENING: ICD-10-CM

## 2020-05-25 ENCOUNTER — TELEPHONE (OUTPATIENT)
Dept: INTERNAL MEDICINE | Facility: CLINIC | Age: 50
End: 2020-05-25

## 2020-05-25 NOTE — TELEPHONE ENCOUNTER
----- Message from Mary Meier sent at 2020 12:18 PM CDT -----  Contact: Rolando (father)  Janes Strange  MRN: [id number]  : 1970  PCP: Angie Bain  Home Phone      [phone]  Work Phone      Not on file.  Mobile          [phone]    MESSAGE:   Mr. Strange request to speak to a nurse regarding appointment access. He would like patient to be seen on Tuesday, May 26 due to he is scheduled for an appointment with PCP.   Patient appointment scheduled on Friday, May 29.    Phone # [phone]    Pharmacy - [facility] [address] [place] [address]

## 2020-05-26 ENCOUNTER — OFFICE VISIT (OUTPATIENT)
Dept: INTERNAL MEDICINE | Facility: CLINIC | Age: 50
End: 2020-05-26
Payer: MEDICARE

## 2020-05-26 VITALS
HEART RATE: 94 BPM | OXYGEN SATURATION: 95 % | TEMPERATURE: 98 F | SYSTOLIC BLOOD PRESSURE: 130 MMHG | HEIGHT: 60 IN | DIASTOLIC BLOOD PRESSURE: 79 MMHG | RESPIRATION RATE: 18 BRPM | BODY MASS INDEX: 24.59 KG/M2 | WEIGHT: 125.25 LBS

## 2020-05-26 DIAGNOSIS — E66.3 OVERWEIGHT (BMI 25.0-29.9): ICD-10-CM

## 2020-05-26 DIAGNOSIS — Z12.11 COLON CANCER SCREENING: ICD-10-CM

## 2020-05-26 DIAGNOSIS — M10.069 IDIOPATHIC GOUT OF KNEE, UNSPECIFIED CHRONICITY, UNSPECIFIED LATERALITY: Primary | ICD-10-CM

## 2020-05-26 DIAGNOSIS — E78.5 HYPERLIPIDEMIA, UNSPECIFIED HYPERLIPIDEMIA TYPE: ICD-10-CM

## 2020-05-26 DIAGNOSIS — Z88.9 H/O SEASONAL ALLERGIES: ICD-10-CM

## 2020-05-26 DIAGNOSIS — K21.9 GASTROESOPHAGEAL REFLUX DISEASE, ESOPHAGITIS PRESENCE NOT SPECIFIED: ICD-10-CM

## 2020-05-26 DIAGNOSIS — Z86.73 HISTORY OF TIA (TRANSIENT ISCHEMIC ATTACK): ICD-10-CM

## 2020-05-26 DIAGNOSIS — Q90.9 DOWN SYNDROME: ICD-10-CM

## 2020-05-26 PROCEDURE — 99499 UNLISTED E&M SERVICE: CPT | Mod: S$GLB,,, | Performed by: INTERNAL MEDICINE

## 2020-05-26 PROCEDURE — 3008F PR BODY MASS INDEX (BMI) DOCUMENTED: ICD-10-PCS | Mod: CPTII,S$GLB,, | Performed by: INTERNAL MEDICINE

## 2020-05-26 PROCEDURE — 99999 PR PBB SHADOW E&M-EST. PATIENT-LVL III: CPT | Mod: PBBFAC,,, | Performed by: INTERNAL MEDICINE

## 2020-05-26 PROCEDURE — 99214 OFFICE O/P EST MOD 30 MIN: CPT | Mod: S$GLB,,, | Performed by: INTERNAL MEDICINE

## 2020-05-26 PROCEDURE — 3008F BODY MASS INDEX DOCD: CPT | Mod: CPTII,S$GLB,, | Performed by: INTERNAL MEDICINE

## 2020-05-26 PROCEDURE — 99214 PR OFFICE/OUTPT VISIT, EST, LEVL IV, 30-39 MIN: ICD-10-PCS | Mod: S$GLB,,, | Performed by: INTERNAL MEDICINE

## 2020-05-26 PROCEDURE — 99999 PR PBB SHADOW E&M-EST. PATIENT-LVL III: ICD-10-PCS | Mod: PBBFAC,,, | Performed by: INTERNAL MEDICINE

## 2020-05-26 PROCEDURE — 99499 RISK ADDL DX/OHS AUDIT: ICD-10-PCS | Mod: S$GLB,,, | Performed by: INTERNAL MEDICINE

## 2020-05-26 RX ORDER — CLOPIDOGREL BISULFATE 75 MG/1
75 TABLET ORAL DAILY
Qty: 90 TABLET | Refills: 3 | Status: SHIPPED | OUTPATIENT
Start: 2020-05-26 | End: 2020-11-18 | Stop reason: SDUPTHER

## 2020-05-26 RX ORDER — LORATADINE 10 MG/1
10 TABLET ORAL DAILY
Qty: 90 TABLET | Refills: 3 | Status: SHIPPED | OUTPATIENT
Start: 2020-05-26 | End: 2020-11-18 | Stop reason: SDUPTHER

## 2020-05-26 RX ORDER — PRAVASTATIN SODIUM 40 MG/1
40 TABLET ORAL DAILY
Qty: 90 TABLET | Refills: 3 | Status: SHIPPED | OUTPATIENT
Start: 2020-05-26 | End: 2020-11-18

## 2020-05-26 RX ORDER — ALLOPURINOL 300 MG/1
300 TABLET ORAL DAILY
Qty: 90 TABLET | Refills: 3 | Status: SHIPPED | OUTPATIENT
Start: 2020-05-26 | End: 2020-11-18 | Stop reason: SDUPTHER

## 2020-05-26 NOTE — PROGRESS NOTES
Subjective:       Patient ID: Janes Strange is a 50 y.o. male.    Chief Complaint: Follow-up (6 mo)      HPI:  Patient is known to me and presents for follow up chronic medical issues. He has Down Syndrome, lives with father. No labs prior to today's visit     Gout: on allopurinol. No recent flares. No joint pain or swelling today. Uric acid normal.     Seasonal allergies: well controlled on Claritin. Denies sneezing, itchy eyes, cough, SOB. Uses cough meds PRN as well.      HLD: on pravastatin. Denies side effects from medications. Last LDL down to 140s. Due for repeat labs next visit. May have to increase intensity statin given h/o TIA vs CVA.      GERD: on omeprazole. Denies chest pains, abd pain, n/v/d/c. Denies belching.     History of CVA: no residual effects. On plavix therapy. Sounds like might have been TIA. No records to review. Was following with Dr. John.    Past Medical History:   Diagnosis Date    Down's syndrome     Seizure        History reviewed. No pertinent family history.    Social History     Socioeconomic History    Marital status: Single     Spouse name: Not on file    Number of children: Not on file    Years of education: Not on file    Highest education level: Not on file   Occupational History    Not on file   Social Needs    Financial resource strain: Not on file    Food insecurity:     Worry: Not on file     Inability: Not on file    Transportation needs:     Medical: Not on file     Non-medical: Not on file   Tobacco Use    Smoking status: Never Smoker    Smokeless tobacco: Never Used   Substance and Sexual Activity    Alcohol use: No    Drug use: No    Sexual activity: Never   Lifestyle    Physical activity:     Days per week: Not on file     Minutes per session: Not on file    Stress: Not on file   Relationships    Social connections:     Talks on phone: Not on file     Gets together: Not on file     Attends Cheondoism service: Not on file     Active member of  club or organization: Not on file     Attends meetings of clubs or organizations: Not on file     Relationship status: Not on file   Other Topics Concern    Not on file   Social History Narrative    Not on file       Review of Systems   Unable to perform ROS: Patient nonverbal     Down's syndrome    Objective:      Physical Exam   Constitutional: He is oriented to person, place, and time. He appears well-developed and well-nourished. No distress.   HENT:   Head: Normocephalic and atraumatic.   Right Ear: External ear normal.   Left Ear: External ear normal.   Eyes: Pupils are equal, round, and reactive to light. Conjunctivae and EOM are normal. Right eye exhibits no discharge. Left eye exhibits no discharge.   Neck: Neck supple. No tracheal deviation present.   Cardiovascular: Normal rate and regular rhythm.   No murmur heard.  Pulmonary/Chest: Effort normal and breath sounds normal. No respiratory distress.   Abdominal: Soft. Bowel sounds are normal. He exhibits no distension. There is no tenderness.   Musculoskeletal: He exhibits no edema.   Neurological: He is alert and oriented to person, place, and time. No cranial nerve deficit.   Skin: Skin is warm and dry.   Psychiatric: He has a normal mood and affect. His behavior is normal.   Vitals reviewed.      Assessment:       1. Idiopathic gout of knee, unspecified chronicity, unspecified laterality    2. History of TIA (transient ischemic attack)    3. Hyperlipidemia, unspecified hyperlipidemia type    4. H/O seasonal allergies    5. Overweight (BMI 25.0-29.9)    6. Gastroesophageal reflux disease, esophagitis presence not specified    7. Down syndrome    8. Colon cancer screening        Plan:       Janes was seen today for follow-up.    Diagnoses and all orders for this visit:    Idiopathic gout of knee, unspecified chronicity, unspecified laterality  -     allopurinoL (ZYLOPRIM) 300 MG tablet; Take 1 tablet (300 mg total) by mouth once daily.  -     CBC auto  differential; Future  -     Comprehensive metabolic panel; Future  -     TSH; Future  -     Lipid Panel; Future  -     Uric acid; Future  Chronic controlled  Cont meds same dose    History of TIA (transient ischemic attack)  -     clopidogreL (PLAVIX) 75 mg tablet; Take 1 tablet (75 mg total) by mouth once daily.  -     pravastatin (PRAVACHOL) 40 MG tablet; Take 1 tablet (40 mg total) by mouth once daily.  History suspect but have continued plavix and statin    Hyperlipidemia, unspecified hyperlipidemia type  -     pravastatin (PRAVACHOL) 40 MG tablet; Take 1 tablet (40 mg total) by mouth once daily.  -     CBC auto differential; Future  -     Comprehensive metabolic panel; Future  -     TSH; Future  -     Lipid Panel; Future  Chronic stable  Cont statin same dose for now  Update labs next visit  With TIA history may try to push LDL a bit further down    H/O seasonal allergies  -     loratadine (CLARITIN) 10 mg tablet; Take 1 tablet (10 mg total) by mouth once daily.  Chronic controlled  Cont meds same dose    Overweight (BMI 25.0-29.9)  -     CBC auto differential; Future  -     Comprehensive metabolic panel; Future  -     TSH; Future  -     Lipid Panel; Future  Diet and exercise    Gastroesophageal reflux disease, esophagitis presence not specified  Chronic stable  Diet modifications    Down syndrome  Lives with elderly father who is his caregiver    Colon cancer screening  -     Cologuard Screening (Multitarget Stool DNA); Future  -     Cologuard Screening (Multitarget Stool DNA)  Discussed risks and benefits of C-scope with father. Elected to do cologuard screening. No FH of CRC.    RTC 6 months with labs and PRN

## 2020-11-11 ENCOUNTER — LAB VISIT (OUTPATIENT)
Dept: LAB | Facility: HOSPITAL | Age: 50
End: 2020-11-11
Attending: INTERNAL MEDICINE
Payer: MEDICARE

## 2020-11-11 DIAGNOSIS — M10.069 IDIOPATHIC GOUT OF KNEE, UNSPECIFIED CHRONICITY, UNSPECIFIED LATERALITY: ICD-10-CM

## 2020-11-11 DIAGNOSIS — E66.3 OVERWEIGHT (BMI 25.0-29.9): ICD-10-CM

## 2020-11-11 DIAGNOSIS — E78.5 HYPERLIPIDEMIA, UNSPECIFIED HYPERLIPIDEMIA TYPE: ICD-10-CM

## 2020-11-11 LAB
ALBUMIN SERPL BCP-MCNC: 3.9 G/DL (ref 3.5–5.2)
ALP SERPL-CCNC: 88 U/L (ref 55–135)
ALT SERPL W/O P-5'-P-CCNC: 20 U/L (ref 10–44)
ANION GAP SERPL CALC-SCNC: 14 MMOL/L (ref 8–16)
AST SERPL-CCNC: 22 U/L (ref 10–40)
BASOPHILS # BLD AUTO: 0.15 K/UL (ref 0–0.2)
BASOPHILS NFR BLD: 1 % (ref 0–1.9)
BILIRUB SERPL-MCNC: 0.6 MG/DL (ref 0.1–1)
BUN SERPL-MCNC: 11 MG/DL (ref 6–20)
CALCIUM SERPL-MCNC: 10 MG/DL (ref 8.7–10.5)
CHLORIDE SERPL-SCNC: 103 MMOL/L (ref 95–110)
CHOLEST SERPL-MCNC: 266 MG/DL (ref 120–199)
CHOLEST/HDLC SERPL: 6.5 {RATIO} (ref 2–5)
CO2 SERPL-SCNC: 23 MMOL/L (ref 23–29)
CREAT SERPL-MCNC: 1.1 MG/DL (ref 0.5–1.4)
DIFFERENTIAL METHOD: ABNORMAL
EOSINOPHIL # BLD AUTO: 0.1 K/UL (ref 0–0.5)
EOSINOPHIL NFR BLD: 0.7 % (ref 0–8)
ERYTHROCYTE [DISTWIDTH] IN BLOOD BY AUTOMATED COUNT: 14.4 % (ref 11.5–14.5)
EST. GFR  (AFRICAN AMERICAN): >60 ML/MIN/1.73 M^2
EST. GFR  (NON AFRICAN AMERICAN): >60 ML/MIN/1.73 M^2
GLUCOSE SERPL-MCNC: 106 MG/DL (ref 70–110)
HCT VFR BLD AUTO: 50.5 % (ref 40–54)
HDLC SERPL-MCNC: 41 MG/DL (ref 40–75)
HDLC SERPL: 15.4 % (ref 20–50)
HGB BLD-MCNC: 16.6 G/DL (ref 14–18)
IMM GRANULOCYTES # BLD AUTO: 0.06 K/UL (ref 0–0.04)
IMM GRANULOCYTES NFR BLD AUTO: 0.4 % (ref 0–0.5)
LDLC SERPL CALC-MCNC: 156.4 MG/DL (ref 63–159)
LYMPHOCYTES # BLD AUTO: 5.7 K/UL (ref 1–4.8)
LYMPHOCYTES NFR BLD: 38.9 % (ref 18–48)
MCH RBC QN AUTO: 29.5 PG (ref 27–31)
MCHC RBC AUTO-ENTMCNC: 32.9 G/DL (ref 32–36)
MCV RBC AUTO: 90 FL (ref 82–98)
MONOCYTES # BLD AUTO: 0.8 K/UL (ref 0.3–1)
MONOCYTES NFR BLD: 5.4 % (ref 4–15)
NEUTROPHILS # BLD AUTO: 7.8 K/UL (ref 1.8–7.7)
NEUTROPHILS NFR BLD: 53.6 % (ref 38–73)
NONHDLC SERPL-MCNC: 225 MG/DL
NRBC BLD-RTO: 0 /100 WBC
PLATELET # BLD AUTO: 309 K/UL (ref 150–350)
PMV BLD AUTO: 9.7 FL (ref 9.2–12.9)
POTASSIUM SERPL-SCNC: 4.3 MMOL/L (ref 3.5–5.1)
PROT SERPL-MCNC: 8.1 G/DL (ref 6–8.4)
RBC # BLD AUTO: 5.62 M/UL (ref 4.6–6.2)
SODIUM SERPL-SCNC: 140 MMOL/L (ref 136–145)
T4 FREE SERPL-MCNC: 0.91 NG/DL (ref 0.71–1.51)
TRIGL SERPL-MCNC: 343 MG/DL (ref 30–150)
TSH SERPL DL<=0.005 MIU/L-ACNC: 4.92 UIU/ML (ref 0.4–4)
URATE SERPL-MCNC: 4.2 MG/DL (ref 3.4–7)
WBC # BLD AUTO: 14.52 K/UL (ref 3.9–12.7)

## 2020-11-11 PROCEDURE — 80061 LIPID PANEL: CPT

## 2020-11-11 PROCEDURE — 85025 COMPLETE CBC W/AUTO DIFF WBC: CPT

## 2020-11-11 PROCEDURE — 80053 COMPREHEN METABOLIC PANEL: CPT

## 2020-11-11 PROCEDURE — 84443 ASSAY THYROID STIM HORMONE: CPT

## 2020-11-11 PROCEDURE — 84439 ASSAY OF FREE THYROXINE: CPT

## 2020-11-11 PROCEDURE — 84550 ASSAY OF BLOOD/URIC ACID: CPT

## 2020-11-11 PROCEDURE — 36415 COLL VENOUS BLD VENIPUNCTURE: CPT

## 2020-11-18 ENCOUNTER — OFFICE VISIT (OUTPATIENT)
Dept: INTERNAL MEDICINE | Facility: CLINIC | Age: 50
End: 2020-11-18
Payer: MEDICARE

## 2020-11-18 VITALS
BODY MASS INDEX: 25.11 KG/M2 | HEART RATE: 106 BPM | RESPIRATION RATE: 17 BRPM | SYSTOLIC BLOOD PRESSURE: 114 MMHG | DIASTOLIC BLOOD PRESSURE: 75 MMHG | WEIGHT: 127.88 LBS | OXYGEN SATURATION: 98 % | HEIGHT: 60 IN | TEMPERATURE: 98 F

## 2020-11-18 DIAGNOSIS — Z86.73 HISTORY OF TIA (TRANSIENT ISCHEMIC ATTACK): ICD-10-CM

## 2020-11-18 DIAGNOSIS — E78.5 HYPERLIPIDEMIA, UNSPECIFIED HYPERLIPIDEMIA TYPE: Primary | ICD-10-CM

## 2020-11-18 DIAGNOSIS — Z12.11 COLON CANCER SCREENING: ICD-10-CM

## 2020-11-18 DIAGNOSIS — K21.9 GASTROESOPHAGEAL REFLUX DISEASE: ICD-10-CM

## 2020-11-18 DIAGNOSIS — Z88.9 H/O SEASONAL ALLERGIES: ICD-10-CM

## 2020-11-18 DIAGNOSIS — M10.069 IDIOPATHIC GOUT OF KNEE, UNSPECIFIED CHRONICITY, UNSPECIFIED LATERALITY: ICD-10-CM

## 2020-11-18 PROCEDURE — 99214 PR OFFICE/OUTPT VISIT, EST, LEVL IV, 30-39 MIN: ICD-10-PCS | Mod: S$GLB,,, | Performed by: INTERNAL MEDICINE

## 2020-11-18 PROCEDURE — 99499 RISK ADDL DX/OHS AUDIT: ICD-10-PCS | Mod: S$GLB,,, | Performed by: INTERNAL MEDICINE

## 2020-11-18 PROCEDURE — 99999 PR PBB SHADOW E&M-EST. PATIENT-LVL IV: ICD-10-PCS | Mod: PBBFAC,,, | Performed by: INTERNAL MEDICINE

## 2020-11-18 PROCEDURE — 3008F BODY MASS INDEX DOCD: CPT | Mod: CPTII,S$GLB,, | Performed by: INTERNAL MEDICINE

## 2020-11-18 PROCEDURE — 99499 UNLISTED E&M SERVICE: CPT | Mod: S$GLB,,, | Performed by: INTERNAL MEDICINE

## 2020-11-18 PROCEDURE — 1126F AMNT PAIN NOTED NONE PRSNT: CPT | Mod: S$GLB,,, | Performed by: INTERNAL MEDICINE

## 2020-11-18 PROCEDURE — 3008F PR BODY MASS INDEX (BMI) DOCUMENTED: ICD-10-PCS | Mod: CPTII,S$GLB,, | Performed by: INTERNAL MEDICINE

## 2020-11-18 PROCEDURE — 1126F PR PAIN SEVERITY QUANTIFIED, NO PAIN PRESENT: ICD-10-PCS | Mod: S$GLB,,, | Performed by: INTERNAL MEDICINE

## 2020-11-18 PROCEDURE — 99214 OFFICE O/P EST MOD 30 MIN: CPT | Mod: S$GLB,,, | Performed by: INTERNAL MEDICINE

## 2020-11-18 PROCEDURE — 99999 PR PBB SHADOW E&M-EST. PATIENT-LVL IV: CPT | Mod: PBBFAC,,, | Performed by: INTERNAL MEDICINE

## 2020-11-18 RX ORDER — LORATADINE 10 MG/1
10 TABLET ORAL DAILY
Qty: 90 TABLET | Refills: 3 | Status: SHIPPED | OUTPATIENT
Start: 2020-11-18 | End: 2021-11-23

## 2020-11-18 RX ORDER — ALLOPURINOL 300 MG/1
300 TABLET ORAL DAILY
Qty: 90 TABLET | Refills: 3 | Status: SHIPPED | OUTPATIENT
Start: 2020-11-18 | End: 2021-10-04

## 2020-11-18 RX ORDER — CLOPIDOGREL BISULFATE 75 MG/1
75 TABLET ORAL DAILY
Qty: 90 TABLET | Refills: 3 | Status: SHIPPED | OUTPATIENT
Start: 2020-11-18 | End: 2021-10-04

## 2020-11-18 RX ORDER — OMEPRAZOLE 20 MG/1
20 CAPSULE, DELAYED RELEASE ORAL DAILY
Qty: 90 CAPSULE | Refills: 3 | Status: SHIPPED | OUTPATIENT
Start: 2020-11-18 | End: 2021-12-28 | Stop reason: SDUPTHER

## 2020-11-18 RX ORDER — ROSUVASTATIN CALCIUM 10 MG/1
10 TABLET, COATED ORAL DAILY
Qty: 90 TABLET | Refills: 3 | Status: SHIPPED | OUTPATIENT
Start: 2020-11-18 | End: 2021-10-04

## 2020-11-18 NOTE — PROGRESS NOTES
Subjective:       Patient ID: Janes Strange is a 50 y.o. male.    Chief Complaint: Follow-up (6 mo)      HPI:    Patient is known to me and presents for follow up chronic medical issues. He has Down Syndrome, lives with father. Labs from 11/11/2020 personally reviewed and discussed with the patient today.     Gout: on allopurinol. No recent flares. No joint pain or swelling today. Uric acid normal.     Seasonal allergies: well controlled on Claritin. Denies sneezing, itchy eyes, cough, SOB. Uses cough meds PRN as well.      HLD: on pravastatin. Denies side effects from medications. Last LDL down to 140-150s but TG trending up to 343 todays. May have to increase intensity statin given h/o TIA vs CVA.      GERD: on omeprazole. Denies chest pains, abd pain, n/v/d/c. Denies belching.     History of CVA: no residual effects. On plavix therapy. Sounds like might have been TIA. No records to review. Was following with Dr. John.    Past Medical History:   Diagnosis Date    Down's syndrome     Seizure        History reviewed. No pertinent family history.    Social History     Socioeconomic History    Marital status: Single     Spouse name: Not on file    Number of children: Not on file    Years of education: Not on file    Highest education level: Not on file   Occupational History    Not on file   Social Needs    Financial resource strain: Not on file    Food insecurity     Worry: Not on file     Inability: Not on file    Transportation needs     Medical: Not on file     Non-medical: Not on file   Tobacco Use    Smoking status: Never Smoker    Smokeless tobacco: Never Used   Substance and Sexual Activity    Alcohol use: No    Drug use: No    Sexual activity: Never   Lifestyle    Physical activity     Days per week: Not on file     Minutes per session: Not on file    Stress: Not on file   Relationships    Social connections     Talks on phone: Not on file     Gets together: Not on file     Attends  "Caodaism service: Not on file     Active member of club or organization: Not on file     Attends meetings of clubs or organizations: Not on file     Relationship status: Not on file   Other Topics Concern    Not on file   Social History Narrative    Not on file       Review of Systems   Unable to perform ROS: Patient nonverbal         Objective:      Physical Exam  Vitals signs reviewed.   Constitutional:       General: He is not in acute distress.     Appearance: He is well-developed.   HENT:      Head: Normocephalic and atraumatic.      Right Ear: External ear normal.      Left Ear: External ear normal.      Nose: Nose normal.      Mouth/Throat:      Mouth: Mucous membranes are moist.      Pharynx: Oropharynx is clear.   Eyes:      General:         Right eye: No discharge.         Left eye: No discharge.      Conjunctiva/sclera: Conjunctivae normal.      Pupils: Pupils are equal, round, and reactive to light.   Neck:      Musculoskeletal: Neck supple.      Thyroid: No thyromegaly.   Cardiovascular:      Rate and Rhythm: Normal rate and regular rhythm.      Heart sounds: No murmur. No friction rub. No gallop.    Pulmonary:      Effort: Pulmonary effort is normal. No respiratory distress.      Breath sounds: Normal breath sounds. No wheezing.   Abdominal:      General: Bowel sounds are normal. There is no distension.      Palpations: Abdomen is soft.      Tenderness: There is no abdominal tenderness.   Lymphadenopathy:      Cervical: No cervical adenopathy.   Skin:     General: Skin is warm and dry.   Neurological:      Mental Status: He is alert and oriented to person, place, and time.      Cranial Nerves: No cranial nerve deficit.      Comments: Follows simply commands "give me a high 5!"   Psychiatric:         Behavior: Behavior normal.         Assessment:       1. Hyperlipidemia, unspecified hyperlipidemia type    2. Gastroesophageal reflux disease    3. H/O seasonal allergies    4. Idiopathic gout of knee, " unspecified chronicity, unspecified laterality    5. History of TIA (transient ischemic attack)    6. Colon cancer screening        Plan:       Janes was seen today for follow-up.    Diagnoses and all orders for this visit:    Hyperlipidemia, unspecified hyperlipidemia type  -     rosuvastatin (CRESTOR) 10 MG tablet; Take 1 tablet (10 mg total) by mouth once daily.  -     CBC Auto Differential; Future  -     Comprehensive Metabolic Panel; Future  -     TSH; Future  -     Lipid Panel; Future  Chronic uncontrolled  Stop pravasatin  Start crestor  Repeat labs 6 months  Diet, exercise modifications discussed with father/caregiver. Patient does not execise at all    Gastroesophageal reflux disease  -     omeprazole (PRILOSEC) 20 MG capsule; Take 1 capsule (20 mg total) by mouth once daily.  Chronic stable  Cont PPI    H/O seasonal allergies  -     loratadine (CLARITIN) 10 mg tablet; Take 1 tablet (10 mg total) by mouth once daily.  Chronic stable  Cont meds same dose    Idiopathic gout of knee, unspecified chronicity, unspecified laterality  -     allopurinoL (ZYLOPRIM) 300 MG tablet; Take 1 tablet (300 mg total) by mouth once daily.  -     CBC Auto Differential; Future  -     Comprehensive Metabolic Panel; Future  -     TSH; Future  -     Lipid Panel; Future  Chronic stable  Uric acid at goal  Cont meds same dose  No flares    History of TIA (transient ischemic attack)  -     clopidogreL (PLAVIX) 75 mg tablet; Take 1 tablet (75 mg total) by mouth once daily.  -     CBC Auto Differential; Future  -     Comprehensive Metabolic Panel; Future  -     TSH; Future  -     Lipid Panel; Future  Increase statin, LDL goal< 70   Cont plavix    Colon cancer screening  Patient's father states he won't lay down for any procedure--stands at [employer], dentist, etc. We may be able to work with anesthesia to give medications where he won't be distressed by having to lay flat.  However, I (and father) are more concerned about the prep. He  "has MR and Down Syndrome and it is unlikely he will drink the entire prep for C-scope  Family doesn't want to do cologuard because "if it's positive what can we do about it anyway"  No strong FH CRC. No BRBPR, constipation, stool changes, etc  Will reach out to Dr. Gómez and see if he has any experience here and suggestions for C-scope prep he could tolerate so he could get screening    RTC 6 months with labs and PRN        "

## 2020-11-18 NOTE — Clinical Note
Hello,    I have this patient with Down Syndrome and MR who turned 50 this year. Would like to do CRC screening for him but I don't think he could tolerate the usual prep. (details in attached note of conversation with his father) but essentially won't do cologuard because if it's positive he can't do the C-scope.    Any suggestions on a prep he could tolerate? His biggest issue is mental retardation; I don't think he could understand why he has to drink so much and his father is in his 80s so dealing with loose stools all night would be so difficult.     Understand it may be a loose-loose situation. Just wondering if you had any experience here. And I'll obviously keep trying to convince to at least do the cologuard.    Thanks,  Angie Bain

## 2020-11-20 ENCOUNTER — TELEPHONE (OUTPATIENT)
Dept: INTERNAL MEDICINE | Facility: CLINIC | Age: 50
End: 2020-11-20

## 2020-11-20 NOTE — TELEPHONE ENCOUNTER
Spoke with  Cricket concerning Janes's colonoscopy prep and he chose to decline having putting him thru procedure.

## 2020-11-20 NOTE — TELEPHONE ENCOUNTER
----- Message from Angie Bain MD sent at 11/20/2020 11:38 AM CST -----  Please let patient father (Mr. Harley) know I talked to the specialist who recommended a prep for the colonoscopy that he thinks Janes could handle. It's not as much to drink as the usual prep. If they would like to try that we could get a colonoscopy scheduled and make a note to use this type of prep. Let me know what he would ilke to do.    Dr. Bain  ----- Message -----  From: Sergio Gómez MD  Sent: 11/18/2020   5:59 PM CST  To: Angie Bain MD    The low volume preps like Suprep are usually pretty well tolerated - I've had a few patients with MR complete them.    ----- Message -----  From: Angie Bain MD  Sent: 11/18/2020   3:28 PM CST  To: Sergio Gómez MD    Hello,    I have this patient with Down Syndrome and MR who turned 50 this year. Would like to do CRC screening for him but I don't think he could tolerate the usual prep. (details in attached note of conversation with his father) but essentially won't do cologuard because if it's positive he can't do the C-scope.    Any suggestions on a prep he could tolerate? His biggest issue is mental retardation; I don't think he could understand why he has to drink so much and his father is in his 80s so dealing with loose stools all night would be so difficult.     Understand it may be a loose-loose situation. Just wondering if you had any experience here. And I'll obviously keep trying to convince to at least do the cologuard.    Thanks,  Angie Bain

## 2020-11-25 ENCOUNTER — TELEPHONE (OUTPATIENT)
Dept: INTERNAL MEDICINE | Facility: CLINIC | Age: 50
End: 2020-11-25

## 2020-11-25 NOTE — TELEPHONE ENCOUNTER
Spoke with pt's father Rolando and he was confused on the pt's medication. Informed him that the pt is to discontinue his pravastatin and start rosuvastatin 10 mg. Also informed him the pt needs to take the Plavix 75 mg. Rolando verbalized understanding. Repeated back to me what he was told to verify he understood.

## 2020-11-25 NOTE — TELEPHONE ENCOUNTER
----- Message from Radha Ryan sent at 2020  1:12 PM CST -----  Contact: Rolando/Father  Janes Strange  MRN: [id number]  : 1970  PCP: Angie Bain  Home Phone      [phone]  Work Phone      Not on file.  Mobile          [phone]      MESSAGE:     Would like to speak to nurse about his medications. He is a little confused and not sure about the changes. Please call to advise.      [phone]

## 2021-04-16 ENCOUNTER — OFFICE VISIT (OUTPATIENT)
Dept: INTERNAL MEDICINE | Facility: CLINIC | Age: 51
End: 2021-04-16
Payer: MEDICARE

## 2021-04-16 VITALS
WEIGHT: 119.06 LBS | RESPIRATION RATE: 12 BRPM | BODY MASS INDEX: 23.37 KG/M2 | HEART RATE: 84 BPM | HEIGHT: 60 IN | OXYGEN SATURATION: 100 % | DIASTOLIC BLOOD PRESSURE: 74 MMHG | SYSTOLIC BLOOD PRESSURE: 118 MMHG

## 2021-04-16 DIAGNOSIS — L73.9 FOLLICULITIS: Primary | ICD-10-CM

## 2021-04-16 PROCEDURE — 1126F PR PAIN SEVERITY QUANTIFIED, NO PAIN PRESENT: ICD-10-PCS | Mod: S$GLB,,, | Performed by: FAMILY MEDICINE

## 2021-04-16 PROCEDURE — 1126F AMNT PAIN NOTED NONE PRSNT: CPT | Mod: S$GLB,,, | Performed by: FAMILY MEDICINE

## 2021-04-16 PROCEDURE — 3008F BODY MASS INDEX DOCD: CPT | Mod: CPTII,S$GLB,, | Performed by: FAMILY MEDICINE

## 2021-04-16 PROCEDURE — 3008F PR BODY MASS INDEX (BMI) DOCUMENTED: ICD-10-PCS | Mod: CPTII,S$GLB,, | Performed by: FAMILY MEDICINE

## 2021-04-16 PROCEDURE — 99999 PR PBB SHADOW E&M-EST. PATIENT-LVL IV: CPT | Mod: PBBFAC,,, | Performed by: FAMILY MEDICINE

## 2021-04-16 PROCEDURE — 99213 OFFICE O/P EST LOW 20 MIN: CPT | Mod: S$GLB,,, | Performed by: FAMILY MEDICINE

## 2021-04-16 PROCEDURE — 99999 PR PBB SHADOW E&M-EST. PATIENT-LVL IV: ICD-10-PCS | Mod: PBBFAC,,, | Performed by: FAMILY MEDICINE

## 2021-04-16 PROCEDURE — 99213 PR OFFICE/OUTPT VISIT, EST, LEVL III, 20-29 MIN: ICD-10-PCS | Mod: S$GLB,,, | Performed by: FAMILY MEDICINE

## 2021-04-16 RX ORDER — DOXYCYCLINE HYCLATE 100 MG
100 TABLET ORAL EVERY 12 HOURS
Qty: 14 TABLET | Refills: 0 | Status: SHIPPED | OUTPATIENT
Start: 2021-04-16 | End: 2021-04-23

## 2021-04-16 RX ORDER — TRIAMCINOLONE ACETONIDE 1 MG/G
CREAM TOPICAL 2 TIMES DAILY
Qty: 45 G | Refills: 5 | Status: SHIPPED | OUTPATIENT
Start: 2021-04-16 | End: 2023-08-01

## 2021-05-13 ENCOUNTER — LAB VISIT (OUTPATIENT)
Dept: LAB | Facility: HOSPITAL | Age: 51
End: 2021-05-13
Attending: INTERNAL MEDICINE
Payer: MEDICARE

## 2021-05-13 DIAGNOSIS — E78.5 HYPERLIPIDEMIA, UNSPECIFIED HYPERLIPIDEMIA TYPE: ICD-10-CM

## 2021-05-13 DIAGNOSIS — Z86.73 HISTORY OF TIA (TRANSIENT ISCHEMIC ATTACK): ICD-10-CM

## 2021-05-13 DIAGNOSIS — M10.069 IDIOPATHIC GOUT OF KNEE, UNSPECIFIED CHRONICITY, UNSPECIFIED LATERALITY: ICD-10-CM

## 2021-05-13 LAB
ALBUMIN SERPL BCP-MCNC: 3.8 G/DL (ref 3.5–5.2)
ALP SERPL-CCNC: 88 U/L (ref 55–135)
ALT SERPL W/O P-5'-P-CCNC: 17 U/L (ref 10–44)
ANION GAP SERPL CALC-SCNC: 11 MMOL/L (ref 8–16)
AST SERPL-CCNC: 19 U/L (ref 10–40)
BASOPHILS # BLD AUTO: 0.15 K/UL (ref 0–0.2)
BASOPHILS NFR BLD: 1.1 % (ref 0–1.9)
BILIRUB SERPL-MCNC: 0.7 MG/DL (ref 0.1–1)
BUN SERPL-MCNC: 19 MG/DL (ref 6–20)
CALCIUM SERPL-MCNC: 9.9 MG/DL (ref 8.7–10.5)
CHLORIDE SERPL-SCNC: 104 MMOL/L (ref 95–110)
CHOLEST SERPL-MCNC: 211 MG/DL (ref 120–199)
CHOLEST/HDLC SERPL: 4.9 {RATIO} (ref 2–5)
CO2 SERPL-SCNC: 24 MMOL/L (ref 23–29)
CREAT SERPL-MCNC: 1.1 MG/DL (ref 0.5–1.4)
DIFFERENTIAL METHOD: ABNORMAL
EOSINOPHIL # BLD AUTO: 0.1 K/UL (ref 0–0.5)
EOSINOPHIL NFR BLD: 1 % (ref 0–8)
ERYTHROCYTE [DISTWIDTH] IN BLOOD BY AUTOMATED COUNT: 13.9 % (ref 11.5–14.5)
EST. GFR  (AFRICAN AMERICAN): >60 ML/MIN/1.73 M^2
EST. GFR  (NON AFRICAN AMERICAN): >60 ML/MIN/1.73 M^2
GLUCOSE SERPL-MCNC: 106 MG/DL (ref 70–110)
HCT VFR BLD AUTO: 50 % (ref 40–54)
HDLC SERPL-MCNC: 43 MG/DL (ref 40–75)
HDLC SERPL: 20.4 % (ref 20–50)
HGB BLD-MCNC: 16.9 G/DL (ref 14–18)
IMM GRANULOCYTES # BLD AUTO: 0.06 K/UL (ref 0–0.04)
IMM GRANULOCYTES NFR BLD AUTO: 0.4 % (ref 0–0.5)
LDLC SERPL CALC-MCNC: 123.8 MG/DL (ref 63–159)
LYMPHOCYTES # BLD AUTO: 4.6 K/UL (ref 1–4.8)
LYMPHOCYTES NFR BLD: 34.5 % (ref 18–48)
MCH RBC QN AUTO: 30.5 PG (ref 27–31)
MCHC RBC AUTO-ENTMCNC: 33.8 G/DL (ref 32–36)
MCV RBC AUTO: 90 FL (ref 82–98)
MONOCYTES # BLD AUTO: 0.8 K/UL (ref 0.3–1)
MONOCYTES NFR BLD: 5.9 % (ref 4–15)
NEUTROPHILS # BLD AUTO: 7.7 K/UL (ref 1.8–7.7)
NEUTROPHILS NFR BLD: 57.1 % (ref 38–73)
NONHDLC SERPL-MCNC: 168 MG/DL
NRBC BLD-RTO: 0 /100 WBC
PLATELET # BLD AUTO: 290 K/UL (ref 150–450)
PMV BLD AUTO: 9.7 FL (ref 9.2–12.9)
POTASSIUM SERPL-SCNC: 4.1 MMOL/L (ref 3.5–5.1)
PROT SERPL-MCNC: 7.8 G/DL (ref 6–8.4)
RBC # BLD AUTO: 5.54 M/UL (ref 4.6–6.2)
SODIUM SERPL-SCNC: 139 MMOL/L (ref 136–145)
T4 FREE SERPL-MCNC: 0.87 NG/DL (ref 0.71–1.51)
TRIGL SERPL-MCNC: 221 MG/DL (ref 30–150)
TSH SERPL DL<=0.005 MIU/L-ACNC: 5.18 UIU/ML (ref 0.4–4)
WBC # BLD AUTO: 13.41 K/UL (ref 3.9–12.7)

## 2021-05-13 PROCEDURE — 84443 ASSAY THYROID STIM HORMONE: CPT | Performed by: INTERNAL MEDICINE

## 2021-05-13 PROCEDURE — 80053 COMPREHEN METABOLIC PANEL: CPT | Performed by: INTERNAL MEDICINE

## 2021-05-13 PROCEDURE — 36415 COLL VENOUS BLD VENIPUNCTURE: CPT | Performed by: INTERNAL MEDICINE

## 2021-05-13 PROCEDURE — 80061 LIPID PANEL: CPT | Performed by: INTERNAL MEDICINE

## 2021-05-13 PROCEDURE — 84439 ASSAY OF FREE THYROXINE: CPT | Performed by: INTERNAL MEDICINE

## 2021-05-13 PROCEDURE — 85025 COMPLETE CBC W/AUTO DIFF WBC: CPT | Performed by: INTERNAL MEDICINE

## 2021-06-28 ENCOUNTER — OFFICE VISIT (OUTPATIENT)
Dept: INTERNAL MEDICINE | Facility: CLINIC | Age: 51
End: 2021-06-28
Payer: MEDICARE

## 2021-06-28 VITALS
TEMPERATURE: 97 F | BODY MASS INDEX: 22.59 KG/M2 | HEART RATE: 88 BPM | HEIGHT: 60 IN | DIASTOLIC BLOOD PRESSURE: 84 MMHG | WEIGHT: 115.06 LBS | RESPIRATION RATE: 16 BRPM | SYSTOLIC BLOOD PRESSURE: 126 MMHG

## 2021-06-28 DIAGNOSIS — M10.069 IDIOPATHIC GOUT OF KNEE, UNSPECIFIED CHRONICITY, UNSPECIFIED LATERALITY: ICD-10-CM

## 2021-06-28 DIAGNOSIS — K21.9 GASTROESOPHAGEAL REFLUX DISEASE, UNSPECIFIED WHETHER ESOPHAGITIS PRESENT: ICD-10-CM

## 2021-06-28 DIAGNOSIS — E55.9 VITAMIN D DEFICIENCY DISEASE: ICD-10-CM

## 2021-06-28 DIAGNOSIS — R05.9 COUGH: ICD-10-CM

## 2021-06-28 DIAGNOSIS — E78.5 HYPERLIPIDEMIA, UNSPECIFIED HYPERLIPIDEMIA TYPE: ICD-10-CM

## 2021-06-28 DIAGNOSIS — Q90.9 DOWN SYNDROME: Primary | ICD-10-CM

## 2021-06-28 DIAGNOSIS — Z86.73 HISTORY OF TIA (TRANSIENT ISCHEMIC ATTACK): ICD-10-CM

## 2021-06-28 PROCEDURE — 99214 OFFICE O/P EST MOD 30 MIN: CPT | Mod: S$GLB,,, | Performed by: NURSE PRACTITIONER

## 2021-06-28 PROCEDURE — 99214 PR OFFICE/OUTPT VISIT, EST, LEVL IV, 30-39 MIN: ICD-10-PCS | Mod: S$GLB,,, | Performed by: NURSE PRACTITIONER

## 2021-06-28 PROCEDURE — 3008F BODY MASS INDEX DOCD: CPT | Mod: CPTII,S$GLB,, | Performed by: NURSE PRACTITIONER

## 2021-06-28 PROCEDURE — 1126F PR PAIN SEVERITY QUANTIFIED, NO PAIN PRESENT: ICD-10-PCS | Mod: S$GLB,,, | Performed by: NURSE PRACTITIONER

## 2021-06-28 PROCEDURE — 1126F AMNT PAIN NOTED NONE PRSNT: CPT | Mod: S$GLB,,, | Performed by: NURSE PRACTITIONER

## 2021-06-28 PROCEDURE — 99999 PR PBB SHADOW E&M-EST. PATIENT-LVL III: CPT | Mod: PBBFAC,,, | Performed by: NURSE PRACTITIONER

## 2021-06-28 PROCEDURE — 99999 PR PBB SHADOW E&M-EST. PATIENT-LVL III: ICD-10-PCS | Mod: PBBFAC,,, | Performed by: NURSE PRACTITIONER

## 2021-06-28 PROCEDURE — 3008F PR BODY MASS INDEX (BMI) DOCUMENTED: ICD-10-PCS | Mod: CPTII,S$GLB,, | Performed by: NURSE PRACTITIONER

## 2021-06-28 RX ORDER — CODEINE PHOSPHATE AND GUAIFENESIN 10; 100 MG/5ML; MG/5ML
5 SOLUTION ORAL 3 TIMES DAILY PRN
Qty: 240 ML | Refills: 0 | Status: SHIPPED | OUTPATIENT
Start: 2021-06-28 | End: 2021-07-08

## 2021-12-21 ENCOUNTER — LAB VISIT (OUTPATIENT)
Dept: LAB | Facility: HOSPITAL | Age: 51
End: 2021-12-21
Attending: NURSE PRACTITIONER
Payer: MEDICARE

## 2021-12-21 DIAGNOSIS — E78.5 HYPERLIPIDEMIA, UNSPECIFIED HYPERLIPIDEMIA TYPE: ICD-10-CM

## 2021-12-21 DIAGNOSIS — M10.069 IDIOPATHIC GOUT OF KNEE, UNSPECIFIED CHRONICITY, UNSPECIFIED LATERALITY: ICD-10-CM

## 2021-12-21 DIAGNOSIS — Z86.73 HISTORY OF TIA (TRANSIENT ISCHEMIC ATTACK): ICD-10-CM

## 2021-12-21 LAB
ALBUMIN SERPL BCP-MCNC: 3.7 G/DL (ref 3.5–5.2)
ALP SERPL-CCNC: 94 U/L (ref 55–135)
ALT SERPL W/O P-5'-P-CCNC: 18 U/L (ref 10–44)
ANION GAP SERPL CALC-SCNC: 12 MMOL/L (ref 8–16)
AST SERPL-CCNC: 19 U/L (ref 10–40)
BASOPHILS # BLD AUTO: 0.17 K/UL (ref 0–0.2)
BASOPHILS NFR BLD: 1.3 % (ref 0–1.9)
BILIRUB SERPL-MCNC: 0.5 MG/DL (ref 0.1–1)
BUN SERPL-MCNC: 9 MG/DL (ref 6–20)
CALCIUM SERPL-MCNC: 10 MG/DL (ref 8.7–10.5)
CHLORIDE SERPL-SCNC: 100 MMOL/L (ref 95–110)
CHOLEST SERPL-MCNC: 200 MG/DL (ref 120–199)
CHOLEST/HDLC SERPL: 5 {RATIO} (ref 2–5)
CO2 SERPL-SCNC: 29 MMOL/L (ref 23–29)
CREAT SERPL-MCNC: 0.9 MG/DL (ref 0.5–1.4)
DIFFERENTIAL METHOD: ABNORMAL
EOSINOPHIL # BLD AUTO: 0.1 K/UL (ref 0–0.5)
EOSINOPHIL NFR BLD: 0.9 % (ref 0–8)
ERYTHROCYTE [DISTWIDTH] IN BLOOD BY AUTOMATED COUNT: 14.6 % (ref 11.5–14.5)
EST. GFR  (AFRICAN AMERICAN): >60 ML/MIN/1.73 M^2
EST. GFR  (NON AFRICAN AMERICAN): >60 ML/MIN/1.73 M^2
GLUCOSE SERPL-MCNC: 96 MG/DL (ref 70–110)
HCT VFR BLD AUTO: 49.1 % (ref 40–54)
HDLC SERPL-MCNC: 40 MG/DL (ref 40–75)
HDLC SERPL: 20 % (ref 20–50)
HGB BLD-MCNC: 15.9 G/DL (ref 14–18)
IMM GRANULOCYTES # BLD AUTO: 0.05 K/UL (ref 0–0.04)
IMM GRANULOCYTES NFR BLD AUTO: 0.4 % (ref 0–0.5)
LDLC SERPL CALC-MCNC: 114 MG/DL (ref 63–159)
LYMPHOCYTES # BLD AUTO: 4.5 K/UL (ref 1–4.8)
LYMPHOCYTES NFR BLD: 35.3 % (ref 18–48)
MCH RBC QN AUTO: 29.9 PG (ref 27–31)
MCHC RBC AUTO-ENTMCNC: 32.4 G/DL (ref 32–36)
MCV RBC AUTO: 93 FL (ref 82–98)
MONOCYTES # BLD AUTO: 0.7 K/UL (ref 0.3–1)
MONOCYTES NFR BLD: 5.5 % (ref 4–15)
NEUTROPHILS # BLD AUTO: 7.2 K/UL (ref 1.8–7.7)
NEUTROPHILS NFR BLD: 56.6 % (ref 38–73)
NONHDLC SERPL-MCNC: 160 MG/DL
NRBC BLD-RTO: 0 /100 WBC
PLATELET # BLD AUTO: 324 K/UL (ref 150–450)
PMV BLD AUTO: 10 FL (ref 9.2–12.9)
POTASSIUM SERPL-SCNC: 4.1 MMOL/L (ref 3.5–5.1)
PROT SERPL-MCNC: 7.7 G/DL (ref 6–8.4)
RBC # BLD AUTO: 5.31 M/UL (ref 4.6–6.2)
SODIUM SERPL-SCNC: 141 MMOL/L (ref 136–145)
T4 FREE SERPL-MCNC: 0.83 NG/DL (ref 0.71–1.51)
TRIGL SERPL-MCNC: 230 MG/DL (ref 30–150)
TSH SERPL DL<=0.005 MIU/L-ACNC: 6.41 UIU/ML (ref 0.4–4)
URATE SERPL-MCNC: 3.9 MG/DL (ref 3.4–7)
WBC # BLD AUTO: 12.7 K/UL (ref 3.9–12.7)

## 2021-12-21 PROCEDURE — 84439 ASSAY OF FREE THYROXINE: CPT | Performed by: NURSE PRACTITIONER

## 2021-12-21 PROCEDURE — 84550 ASSAY OF BLOOD/URIC ACID: CPT | Performed by: NURSE PRACTITIONER

## 2021-12-21 PROCEDURE — 84443 ASSAY THYROID STIM HORMONE: CPT | Performed by: NURSE PRACTITIONER

## 2021-12-21 PROCEDURE — 80053 COMPREHEN METABOLIC PANEL: CPT | Performed by: NURSE PRACTITIONER

## 2021-12-21 PROCEDURE — 36415 COLL VENOUS BLD VENIPUNCTURE: CPT | Performed by: NURSE PRACTITIONER

## 2021-12-21 PROCEDURE — 80061 LIPID PANEL: CPT | Performed by: NURSE PRACTITIONER

## 2021-12-21 PROCEDURE — 85025 COMPLETE CBC W/AUTO DIFF WBC: CPT | Performed by: NURSE PRACTITIONER

## 2021-12-28 DIAGNOSIS — Z86.73 HISTORY OF TIA (TRANSIENT ISCHEMIC ATTACK): ICD-10-CM

## 2021-12-28 DIAGNOSIS — Z88.9 H/O SEASONAL ALLERGIES: ICD-10-CM

## 2021-12-28 DIAGNOSIS — K21.9 GASTROESOPHAGEAL REFLUX DISEASE: ICD-10-CM

## 2021-12-28 DIAGNOSIS — M10.069 IDIOPATHIC GOUT OF KNEE, UNSPECIFIED CHRONICITY, UNSPECIFIED LATERALITY: ICD-10-CM

## 2021-12-28 DIAGNOSIS — E78.5 HYPERLIPIDEMIA, UNSPECIFIED HYPERLIPIDEMIA TYPE: ICD-10-CM

## 2021-12-28 RX ORDER — OMEPRAZOLE 20 MG/1
20 CAPSULE, DELAYED RELEASE ORAL DAILY
Qty: 90 CAPSULE | Refills: 0 | Status: SHIPPED | OUTPATIENT
Start: 2021-12-28 | End: 2022-01-10

## 2021-12-28 RX ORDER — LORATADINE 10 MG/1
10 TABLET ORAL DAILY
Qty: 90 TABLET | Refills: 0 | Status: SHIPPED | OUTPATIENT
Start: 2021-12-28 | End: 2022-01-25 | Stop reason: SDUPTHER

## 2021-12-28 RX ORDER — ROSUVASTATIN CALCIUM 10 MG/1
10 TABLET, COATED ORAL DAILY
Qty: 90 TABLET | Refills: 0 | Status: SHIPPED | OUTPATIENT
Start: 2021-12-28 | End: 2022-01-25 | Stop reason: SDUPTHER

## 2021-12-28 RX ORDER — ALLOPURINOL 300 MG/1
300 TABLET ORAL DAILY
Qty: 90 TABLET | Refills: 0 | Status: SHIPPED | OUTPATIENT
Start: 2021-12-28 | End: 2022-01-25 | Stop reason: SDUPTHER

## 2021-12-28 RX ORDER — CLOPIDOGREL BISULFATE 75 MG/1
75 TABLET ORAL DAILY
Qty: 90 TABLET | Refills: 0 | Status: SHIPPED | OUTPATIENT
Start: 2021-12-28 | End: 2022-01-25 | Stop reason: SDUPTHER

## 2022-01-08 DIAGNOSIS — K21.9 GASTROESOPHAGEAL REFLUX DISEASE: ICD-10-CM

## 2022-01-08 NOTE — TELEPHONE ENCOUNTER
No new care gaps identified.  Powered by [employer]. Reference number: [id number].   1/08/2022 1:14:34 PM CST

## 2022-01-10 RX ORDER — OMEPRAZOLE 20 MG/1
CAPSULE, DELAYED RELEASE ORAL
Qty: 90 CAPSULE | Refills: 0 | Status: SHIPPED | OUTPATIENT
Start: 2022-01-10 | End: 2022-01-25 | Stop reason: SDUPTHER

## 2022-01-25 ENCOUNTER — OFFICE VISIT (OUTPATIENT)
Dept: INTERNAL MEDICINE | Facility: CLINIC | Age: 52
End: 2022-01-25
Payer: MEDICARE

## 2022-01-25 VITALS
DIASTOLIC BLOOD PRESSURE: 58 MMHG | BODY MASS INDEX: 23.89 KG/M2 | HEART RATE: 67 BPM | WEIGHT: 121.69 LBS | SYSTOLIC BLOOD PRESSURE: 110 MMHG | OXYGEN SATURATION: 97 % | HEIGHT: 60 IN | RESPIRATION RATE: 16 BRPM

## 2022-01-25 DIAGNOSIS — Z88.9 H/O SEASONAL ALLERGIES: ICD-10-CM

## 2022-01-25 DIAGNOSIS — M10.069 IDIOPATHIC GOUT OF KNEE, UNSPECIFIED CHRONICITY, UNSPECIFIED LATERALITY: ICD-10-CM

## 2022-01-25 DIAGNOSIS — E78.5 HYPERLIPIDEMIA, UNSPECIFIED HYPERLIPIDEMIA TYPE: ICD-10-CM

## 2022-01-25 DIAGNOSIS — J34.89 NASAL SORE: Primary | ICD-10-CM

## 2022-01-25 DIAGNOSIS — K21.9 GASTROESOPHAGEAL REFLUX DISEASE: ICD-10-CM

## 2022-01-25 DIAGNOSIS — Z86.73 HISTORY OF TIA (TRANSIENT ISCHEMIC ATTACK): ICD-10-CM

## 2022-01-25 PROCEDURE — 1160F RVW MEDS BY RX/DR IN RCRD: CPT | Mod: CPTII,S$GLB,, | Performed by: NURSE PRACTITIONER

## 2022-01-25 PROCEDURE — 99214 OFFICE O/P EST MOD 30 MIN: CPT | Mod: S$GLB,,, | Performed by: NURSE PRACTITIONER

## 2022-01-25 PROCEDURE — 3008F PR BODY MASS INDEX (BMI) DOCUMENTED: ICD-10-PCS | Mod: CPTII,S$GLB,, | Performed by: NURSE PRACTITIONER

## 2022-01-25 PROCEDURE — 3078F PR MOST RECENT DIASTOLIC BLOOD PRESSURE < 80 MM HG: ICD-10-PCS | Mod: CPTII,S$GLB,, | Performed by: NURSE PRACTITIONER

## 2022-01-25 PROCEDURE — 3008F BODY MASS INDEX DOCD: CPT | Mod: CPTII,S$GLB,, | Performed by: NURSE PRACTITIONER

## 2022-01-25 PROCEDURE — 1160F PR REVIEW ALL MEDS BY PRESCRIBER/CLIN PHARMACIST DOCUMENTED: ICD-10-PCS | Mod: CPTII,S$GLB,, | Performed by: NURSE PRACTITIONER

## 2022-01-25 PROCEDURE — 99214 PR OFFICE/OUTPT VISIT, EST, LEVL IV, 30-39 MIN: ICD-10-PCS | Mod: S$GLB,,, | Performed by: NURSE PRACTITIONER

## 2022-01-25 PROCEDURE — 99999 PR PBB SHADOW E&M-EST. PATIENT-LVL III: CPT | Mod: PBBFAC,,, | Performed by: NURSE PRACTITIONER

## 2022-01-25 PROCEDURE — 3078F DIAST BP <80 MM HG: CPT | Mod: CPTII,S$GLB,, | Performed by: NURSE PRACTITIONER

## 2022-01-25 PROCEDURE — 1159F PR MEDICATION LIST DOCUMENTED IN MEDICAL RECORD: ICD-10-PCS | Mod: CPTII,S$GLB,, | Performed by: NURSE PRACTITIONER

## 2022-01-25 PROCEDURE — 99999 PR PBB SHADOW E&M-EST. PATIENT-LVL III: ICD-10-PCS | Mod: PBBFAC,,, | Performed by: NURSE PRACTITIONER

## 2022-01-25 PROCEDURE — 1159F MED LIST DOCD IN RCRD: CPT | Mod: CPTII,S$GLB,, | Performed by: NURSE PRACTITIONER

## 2022-01-25 PROCEDURE — 3074F PR MOST RECENT SYSTOLIC BLOOD PRESSURE < 130 MM HG: ICD-10-PCS | Mod: CPTII,S$GLB,, | Performed by: NURSE PRACTITIONER

## 2022-01-25 PROCEDURE — 3074F SYST BP LT 130 MM HG: CPT | Mod: CPTII,S$GLB,, | Performed by: NURSE PRACTITIONER

## 2022-01-25 RX ORDER — ROSUVASTATIN CALCIUM 10 MG/1
10 TABLET, COATED ORAL DAILY
Qty: 90 TABLET | Refills: 3 | Status: SHIPPED | OUTPATIENT
Start: 2022-01-25 | End: 2023-02-01 | Stop reason: SDUPTHER

## 2022-01-25 RX ORDER — CLOPIDOGREL BISULFATE 75 MG/1
75 TABLET ORAL DAILY
Qty: 90 TABLET | Refills: 3 | Status: SHIPPED | OUTPATIENT
Start: 2022-01-25 | End: 2023-02-01 | Stop reason: SDUPTHER

## 2022-01-25 RX ORDER — CODEINE PHOSPHATE AND GUAIFENESIN 10; 100 MG/5ML; MG/5ML
5 SOLUTION ORAL 3 TIMES DAILY PRN
Qty: 120 ML | Refills: 0 | Status: SHIPPED | OUTPATIENT
Start: 2022-01-25 | End: 2022-08-01 | Stop reason: SDUPTHER

## 2022-01-25 RX ORDER — ALLOPURINOL 300 MG/1
300 TABLET ORAL DAILY
Qty: 90 TABLET | Refills: 3 | Status: SHIPPED | OUTPATIENT
Start: 2022-01-25 | End: 2023-02-01 | Stop reason: SDUPTHER

## 2022-01-25 RX ORDER — LORATADINE 10 MG/1
10 TABLET ORAL DAILY
Qty: 90 TABLET | Refills: 3 | Status: SHIPPED | OUTPATIENT
Start: 2022-01-25 | End: 2022-06-08

## 2022-01-25 RX ORDER — MUPIROCIN 20 MG/G
OINTMENT TOPICAL 3 TIMES DAILY
Qty: 30 G | Refills: 1 | Status: SHIPPED | OUTPATIENT
Start: 2022-01-25 | End: 2023-08-01

## 2022-01-25 RX ORDER — OMEPRAZOLE 20 MG/1
20 CAPSULE, DELAYED RELEASE ORAL DAILY
Qty: 90 CAPSULE | Refills: 3 | Status: SHIPPED | OUTPATIENT
Start: 2022-01-25 | End: 2023-02-01 | Stop reason: SDUPTHER

## 2022-01-25 NOTE — PROGRESS NOTES
Subjective:       Patient ID: Janes Strange is a 51 y.o. male.    Chief Complaint: Follow-up    HPI: Pt presents to clinic today known to me with his father. He is no longer seeing any specialist. He is still living at home, by themselves.     Lab Results   Component Value Date    TSH 6.414 (H) 12/21/2021   free t4 0.83   Lab Results   Component Value Date    WBC 12.70 12/21/2021    HGB 15.9 12/21/2021    HCT 49.1 12/21/2021    MCV 93 12/21/2021     12/21/2021     Total chol 200, trig 230, hdl 40, ldl 114  BMP  Lab Results   Component Value Date     12/21/2021    K 4.1 12/21/2021     12/21/2021    CO2 29 12/21/2021    BUN 9 12/21/2021    CREATININE 0.9 12/21/2021    CALCIUM 10.0 12/21/2021    ANIONGAP 12 12/21/2021    ESTGFRAFRICA >60 12/21/2021    EGFRNONAA >60 12/21/2021     Lab Results   Component Value Date    ALT 18 12/21/2021    AST 19 12/21/2021    ALKPHOS 94 12/21/2021    BILITOT 0.5 12/21/2021     Uric 3.9  Review of Systems   Constitutional: Negative for chills and fever.   HENT: Negative for congestion, postnasal drip and sore throat.    Eyes: Negative for photophobia.   Respiratory: Negative for chest tightness and shortness of breath.    Cardiovascular: Negative for chest pain.   Gastrointestinal: Negative for abdominal distention, abdominal pain, blood in stool and vomiting.   Genitourinary: Negative for dysuria, flank pain and hematuria.   Musculoskeletal: Negative for back pain.   Skin: Negative for pallor.   Neurological: Negative for dizziness, seizures, facial asymmetry, speech difficulty and numbness.   Hematological: Does not bruise/bleed easily.   Psychiatric/Behavioral: Negative for agitation and suicidal ideas. The patient is not nervous/anxious.        Objective:      Physical Exam  Vitals and nursing note reviewed.   Constitutional:       Appearance: Normal appearance. He is well-developed and well-nourished.   HENT:      Head: Normocephalic and atraumatic.      Nose: Nose  normal.      Mouth/Throat:      Mouth: Oropharynx is clear and moist.   Eyes:      Extraocular Movements: EOM normal.      Conjunctiva/sclera: Conjunctivae normal.      Pupils: Pupils are equal, round, and reactive to light.   Neck:      Thyroid: No thyromegaly.      Vascular: No JVD.   Cardiovascular:      Rate and Rhythm: Normal rate and regular rhythm.      Pulses: Intact distal pulses.      Heart sounds: Normal heart sounds. No murmur heard.      Pulmonary:      Effort: Pulmonary effort is normal. No respiratory distress.      Breath sounds: Normal breath sounds. No stridor. No wheezing.   Abdominal:      General: Bowel sounds are normal. There is no distension.      Palpations: Abdomen is soft. There is no mass.      Tenderness: There is no abdominal tenderness. There is no guarding.   Musculoskeletal:         General: No swelling or edema. Normal range of motion.      Cervical back: Normal range of motion and neck supple.   Lymphadenopathy:      Cervical: No cervical adenopathy.   Skin:     General: Skin is warm and dry.      Coloration: Skin is not pale.      Findings: No rash.   Neurological:      Mental Status: He is alert and oriented to person, place, and time.      Cranial Nerves: No cranial nerve deficit.      Deep Tendon Reflexes: Reflexes are normal and symmetric.   Psychiatric:         Mood and Affect: Mood and affect normal.         Assessment:       1. Nasal sore    2. Idiopathic gout of knee, unspecified chronicity, unspecified laterality    3. History of TIA (transient ischemic attack)    4. Gastroesophageal reflux disease    5. Hyperlipidemia, unspecified hyperlipidemia type    6. H/O seasonal allergies        Plan:     Problem List Items Addressed This Visit     Gout    Relevant Medications    allopurinoL (ZYLOPRIM) 300 MG tablet    History of TIA (transient ischemic attack)    Relevant Medications    clopidogreL (PLAVIX) 75 mg tablet    H/O seasonal allergies    Relevant Medications     loratadine (CLARITIN) 10 mg tablet    HLD (hyperlipidemia)    Relevant Medications    rosuvastatin (CRESTOR) 10 MG tablet      Other Visit Diagnoses     Nasal sore    -  Primary    Relevant Medications    mupirocin (BACTROBAN) 2 % ointment    Gastroesophageal reflux disease        Relevant Medications    omeprazole (PRILOSEC) 20 MG capsule        Subclinical hypothyroid- will cont to monitor for now

## 2022-03-04 ENCOUNTER — PATIENT OUTREACH (OUTPATIENT)
Dept: ADMINISTRATIVE | Facility: HOSPITAL | Age: 52
End: 2022-03-04
Payer: MEDICARE

## 2022-03-04 DIAGNOSIS — Z12.11 COLON CANCER SCREENING: Primary | ICD-10-CM

## 2022-03-04 NOTE — PROGRESS NOTES
Chart reviewed, immunization record updated.  No new results noted on [facility] or [facility] web site.  Care Everywhere updated.   Patient care coordination note and upcoming PCP visit updated.  Fit kit order placed.  Left detailed message for patient to return call to discuss Colorectal Cancer Screening.

## 2022-04-19 ENCOUNTER — PATIENT OUTREACH (OUTPATIENT)
Dept: ADMINISTRATIVE | Facility: HOSPITAL | Age: 52
End: 2022-04-19
Payer: MEDICARE

## 2022-04-19 NOTE — PROGRESS NOTES
Chart reviewed, immunization record updated.  No new results noted on [facility] or [facility] web site.  Care Everywhere updated.   Patient care coordination note and upcoming PCP visit updated.  Left detailed message for patient to return call to discuss Colorectal Cancer Screening.

## 2022-06-06 DIAGNOSIS — Z88.9 H/O SEASONAL ALLERGIES: ICD-10-CM

## 2022-06-07 NOTE — TELEPHONE ENCOUNTER
Refill Routing Note   Medication(s) are not appropriate for processing by [facility] for the following reason(s):      - Non-participating provider    [facility] action(s):  Route          Medication reconciliation completed: No     Appointments  past 12m or future 3m with PCP    Date Provider   Last Visit   1/25/2022 Luna Kelly NP   Next Visit   7/25/2022 Luna Kelly NP   ED visits in past 90 days: 0        Note composed:8:18 AM 06/07/2022

## 2022-06-08 RX ORDER — LORATADINE 10 MG/1
TABLET ORAL
Qty: 90 TABLET | Refills: 0 | Status: SHIPPED | OUTPATIENT
Start: 2022-06-08 | End: 2023-08-01 | Stop reason: SDUPTHER

## 2022-08-01 ENCOUNTER — OFFICE VISIT (OUTPATIENT)
Dept: INTERNAL MEDICINE | Facility: CLINIC | Age: 52
End: 2022-08-01
Payer: MEDICARE

## 2022-08-01 VITALS
WEIGHT: 123 LBS | HEART RATE: 97 BPM | RESPIRATION RATE: 16 BRPM | OXYGEN SATURATION: 98 % | HEIGHT: 60 IN | SYSTOLIC BLOOD PRESSURE: 118 MMHG | BODY MASS INDEX: 24.15 KG/M2 | DIASTOLIC BLOOD PRESSURE: 62 MMHG

## 2022-08-01 DIAGNOSIS — M10.069 IDIOPATHIC GOUT OF KNEE, UNSPECIFIED CHRONICITY, UNSPECIFIED LATERALITY: ICD-10-CM

## 2022-08-01 DIAGNOSIS — E78.5 HYPERLIPIDEMIA, UNSPECIFIED HYPERLIPIDEMIA TYPE: Primary | ICD-10-CM

## 2022-08-01 DIAGNOSIS — E03.8 SUBCLINICAL HYPOTHYROIDISM: ICD-10-CM

## 2022-08-01 DIAGNOSIS — Z88.9 H/O SEASONAL ALLERGIES: ICD-10-CM

## 2022-08-01 DIAGNOSIS — E66.3 OVERWEIGHT (BMI 25.0-29.9): ICD-10-CM

## 2022-08-01 DIAGNOSIS — I25.10 CORONARY ARTERY DISEASE, UNSPECIFIED VESSEL OR LESION TYPE, UNSPECIFIED WHETHER ANGINA PRESENT, UNSPECIFIED WHETHER NATIVE OR TRANSPLANTED HEART: ICD-10-CM

## 2022-08-01 DIAGNOSIS — Z86.73 HISTORY OF TIA (TRANSIENT ISCHEMIC ATTACK): ICD-10-CM

## 2022-08-01 PROCEDURE — 3008F PR BODY MASS INDEX (BMI) DOCUMENTED: ICD-10-PCS | Mod: CPTII,S$GLB,, | Performed by: NURSE PRACTITIONER

## 2022-08-01 PROCEDURE — 99999 PR PBB SHADOW E&M-EST. PATIENT-LVL III: ICD-10-PCS | Mod: PBBFAC,,, | Performed by: NURSE PRACTITIONER

## 2022-08-01 PROCEDURE — 99214 PR OFFICE/OUTPT VISIT, EST, LEVL IV, 30-39 MIN: ICD-10-PCS | Mod: S$GLB,,, | Performed by: NURSE PRACTITIONER

## 2022-08-01 PROCEDURE — 99999 PR PBB SHADOW E&M-EST. PATIENT-LVL III: CPT | Mod: PBBFAC,,, | Performed by: NURSE PRACTITIONER

## 2022-08-01 PROCEDURE — 3008F BODY MASS INDEX DOCD: CPT | Mod: CPTII,S$GLB,, | Performed by: NURSE PRACTITIONER

## 2022-08-01 PROCEDURE — 3074F SYST BP LT 130 MM HG: CPT | Mod: CPTII,S$GLB,, | Performed by: NURSE PRACTITIONER

## 2022-08-01 PROCEDURE — 3074F PR MOST RECENT SYSTOLIC BLOOD PRESSURE < 130 MM HG: ICD-10-PCS | Mod: CPTII,S$GLB,, | Performed by: NURSE PRACTITIONER

## 2022-08-01 PROCEDURE — 1159F PR MEDICATION LIST DOCUMENTED IN MEDICAL RECORD: ICD-10-PCS | Mod: CPTII,S$GLB,, | Performed by: NURSE PRACTITIONER

## 2022-08-01 PROCEDURE — 3078F DIAST BP <80 MM HG: CPT | Mod: CPTII,S$GLB,, | Performed by: NURSE PRACTITIONER

## 2022-08-01 PROCEDURE — 3078F PR MOST RECENT DIASTOLIC BLOOD PRESSURE < 80 MM HG: ICD-10-PCS | Mod: CPTII,S$GLB,, | Performed by: NURSE PRACTITIONER

## 2022-08-01 PROCEDURE — 1160F PR REVIEW ALL MEDS BY PRESCRIBER/CLIN PHARMACIST DOCUMENTED: ICD-10-PCS | Mod: CPTII,S$GLB,, | Performed by: NURSE PRACTITIONER

## 2022-08-01 PROCEDURE — 1160F RVW MEDS BY RX/DR IN RCRD: CPT | Mod: CPTII,S$GLB,, | Performed by: NURSE PRACTITIONER

## 2022-08-01 PROCEDURE — 99214 OFFICE O/P EST MOD 30 MIN: CPT | Mod: S$GLB,,, | Performed by: NURSE PRACTITIONER

## 2022-08-01 PROCEDURE — 1159F MED LIST DOCD IN RCRD: CPT | Mod: CPTII,S$GLB,, | Performed by: NURSE PRACTITIONER

## 2022-08-01 RX ORDER — CODEINE PHOSPHATE AND GUAIFENESIN 10; 100 MG/5ML; MG/5ML
5 SOLUTION ORAL 3 TIMES DAILY PRN
Qty: 120 ML | Refills: 0 | Status: SHIPPED | OUTPATIENT
Start: 2022-08-01 | End: 2023-02-01 | Stop reason: SDUPTHER

## 2022-08-01 NOTE — PROGRESS NOTES
Subjective:       Patient ID: Janes Strange is a 52 y.o. male.    Chief Complaint: Follow-up    HPI: Pt presents to clinic today known to me with his father. He has no complaints. The father gives most hx. He does reports that he has nebulizer but does not use very often. Only when he hears a rattle cough and also uses cough meds as needed. Would like refill on syrup. Last filled 6 months ago. Also had labs 6 months ago. TSh has been elevated but subclincal and following normal free t4 . Uric acid was well below goal. Trigs elevated 230, ldl was 114  Review of Systems   Constitutional: Negative for chills and fever.   HENT: Negative for congestion, postnasal drip and sore throat.    Eyes: Negative for photophobia.   Respiratory: Negative for chest tightness and shortness of breath.    Cardiovascular: Negative for chest pain.   Gastrointestinal: Negative for abdominal distention, abdominal pain, blood in stool and vomiting.   Genitourinary: Negative for dysuria, flank pain and hematuria.   Musculoskeletal: Negative for back pain.   Skin: Positive for wound. Negative for pallor.        Left lower lip abrasion    Neurological: Negative for dizziness, seizures, facial asymmetry, speech difficulty and numbness.   Hematological: Does not bruise/bleed easily.   Psychiatric/Behavioral: Negative for agitation and suicidal ideas. The patient is not nervous/anxious.        Objective:      Physical Exam  Vitals and nursing note reviewed.   Constitutional:       Appearance: He is well-developed. He is obese.   HENT:      Head: Normocephalic and atraumatic.   Neck:      Vascular: No JVD.   Cardiovascular:      Rate and Rhythm: Normal rate and regular rhythm.      Heart sounds: Normal heart sounds. No murmur heard.  Pulmonary:      Effort: Pulmonary effort is normal. No respiratory distress.      Breath sounds: Normal breath sounds. No wheezing.   Abdominal:      General: Bowel sounds are normal.      Palpations: Abdomen is soft.       Tenderness: There is no abdominal tenderness.   Skin:     General: Skin is warm and dry.      Capillary Refill: Capillary refill takes less than 2 seconds.      Comments: Fever blister left lower lip   Neurological:      Mental Status: He is alert.   Psychiatric:         Behavior: Behavior normal.         Thought Content: Thought content normal.         Judgment: Judgment normal.         Assessment:       1. Hyperlipidemia, unspecified hyperlipidemia type    2. Subclinical hypothyroidism    3. Coronary artery disease, unspecified vessel or lesion type, unspecified whether angina present, unspecified whether native or transplanted heart    4. Idiopathic gout of knee, unspecified chronicity, unspecified laterality    5. History of TIA (transient ischemic attack)    6. Overweight (BMI 25.0-29.9)        Plan:     Problem List Items Addressed This Visit     Gout- well controlled uric acid cont allopurinol     History of TIA (transient ischemic attack)cont plavix      HLD (hyperlipidemia) - Primary    Relevant Orders    Lipid Panel    Overweight (BMI 25.0-29.9)      Other Visit Diagnoses     Subclinical hypothyroidism        Relevant Orders    TSH    Hx TIA-      Relevant Orders    Comprehensive Metabolic Panel    CBC Auto Differential        Father declines the colorectal screens    Repeat labs next available. Due for labs next available

## 2023-02-01 ENCOUNTER — OFFICE VISIT (OUTPATIENT)
Dept: INTERNAL MEDICINE | Facility: CLINIC | Age: 53
End: 2023-02-01
Payer: MEDICARE

## 2023-02-01 VITALS
SYSTOLIC BLOOD PRESSURE: 126 MMHG | WEIGHT: 124.13 LBS | RESPIRATION RATE: 18 BRPM | BODY MASS INDEX: 24.37 KG/M2 | DIASTOLIC BLOOD PRESSURE: 78 MMHG | HEIGHT: 60 IN | HEART RATE: 86 BPM

## 2023-02-01 DIAGNOSIS — Z88.9 H/O SEASONAL ALLERGIES: ICD-10-CM

## 2023-02-01 DIAGNOSIS — Z86.73 HISTORY OF TIA (TRANSIENT ISCHEMIC ATTACK): ICD-10-CM

## 2023-02-01 DIAGNOSIS — E78.5 HYPERLIPIDEMIA, UNSPECIFIED HYPERLIPIDEMIA TYPE: ICD-10-CM

## 2023-02-01 DIAGNOSIS — K21.9 GASTROESOPHAGEAL REFLUX DISEASE: ICD-10-CM

## 2023-02-01 DIAGNOSIS — M10.069 IDIOPATHIC GOUT OF KNEE, UNSPECIFIED CHRONICITY, UNSPECIFIED LATERALITY: ICD-10-CM

## 2023-02-01 DIAGNOSIS — Q90.9 DOWN SYNDROME: Primary | ICD-10-CM

## 2023-02-01 PROCEDURE — 3074F SYST BP LT 130 MM HG: CPT | Mod: CPTII,S$GLB,, | Performed by: NURSE PRACTITIONER

## 2023-02-01 PROCEDURE — 1160F PR REVIEW ALL MEDS BY PRESCRIBER/CLIN PHARMACIST DOCUMENTED: ICD-10-PCS | Mod: CPTII,S$GLB,, | Performed by: NURSE PRACTITIONER

## 2023-02-01 PROCEDURE — 1160F RVW MEDS BY RX/DR IN RCRD: CPT | Mod: CPTII,S$GLB,, | Performed by: NURSE PRACTITIONER

## 2023-02-01 PROCEDURE — 99999 PR PBB SHADOW E&M-EST. PATIENT-LVL III: ICD-10-PCS | Mod: PBBFAC,,, | Performed by: NURSE PRACTITIONER

## 2023-02-01 PROCEDURE — 3078F PR MOST RECENT DIASTOLIC BLOOD PRESSURE < 80 MM HG: ICD-10-PCS | Mod: CPTII,S$GLB,, | Performed by: NURSE PRACTITIONER

## 2023-02-01 PROCEDURE — 3074F PR MOST RECENT SYSTOLIC BLOOD PRESSURE < 130 MM HG: ICD-10-PCS | Mod: CPTII,S$GLB,, | Performed by: NURSE PRACTITIONER

## 2023-02-01 PROCEDURE — 3078F DIAST BP <80 MM HG: CPT | Mod: CPTII,S$GLB,, | Performed by: NURSE PRACTITIONER

## 2023-02-01 PROCEDURE — 99214 PR OFFICE/OUTPT VISIT, EST, LEVL IV, 30-39 MIN: ICD-10-PCS | Mod: S$GLB,,, | Performed by: NURSE PRACTITIONER

## 2023-02-01 PROCEDURE — 3008F BODY MASS INDEX DOCD: CPT | Mod: CPTII,S$GLB,, | Performed by: NURSE PRACTITIONER

## 2023-02-01 PROCEDURE — 99999 PR PBB SHADOW E&M-EST. PATIENT-LVL III: CPT | Mod: PBBFAC,,, | Performed by: NURSE PRACTITIONER

## 2023-02-01 PROCEDURE — 3008F PR BODY MASS INDEX (BMI) DOCUMENTED: ICD-10-PCS | Mod: CPTII,S$GLB,, | Performed by: NURSE PRACTITIONER

## 2023-02-01 PROCEDURE — 1159F PR MEDICATION LIST DOCUMENTED IN MEDICAL RECORD: ICD-10-PCS | Mod: CPTII,S$GLB,, | Performed by: NURSE PRACTITIONER

## 2023-02-01 PROCEDURE — 1159F MED LIST DOCD IN RCRD: CPT | Mod: CPTII,S$GLB,, | Performed by: NURSE PRACTITIONER

## 2023-02-01 PROCEDURE — 99214 OFFICE O/P EST MOD 30 MIN: CPT | Mod: S$GLB,,, | Performed by: NURSE PRACTITIONER

## 2023-02-01 RX ORDER — ALLOPURINOL 300 MG/1
300 TABLET ORAL DAILY
Qty: 90 TABLET | Refills: 3 | Status: SHIPPED | OUTPATIENT
Start: 2023-02-01 | End: 2023-08-01 | Stop reason: SDUPTHER

## 2023-02-01 RX ORDER — CODEINE PHOSPHATE AND GUAIFENESIN 10; 100 MG/5ML; MG/5ML
5 SOLUTION ORAL 3 TIMES DAILY PRN
Qty: 120 ML | Refills: 0 | Status: SHIPPED | OUTPATIENT
Start: 2023-02-01 | End: 2023-02-06 | Stop reason: SDUPTHER

## 2023-02-01 RX ORDER — CLOPIDOGREL BISULFATE 75 MG/1
75 TABLET ORAL DAILY
Qty: 90 TABLET | Refills: 3 | Status: SHIPPED | OUTPATIENT
Start: 2023-02-01 | End: 2023-08-01 | Stop reason: SDUPTHER

## 2023-02-01 RX ORDER — ROSUVASTATIN CALCIUM 10 MG/1
10 TABLET, COATED ORAL DAILY
Qty: 90 TABLET | Refills: 3 | Status: SHIPPED | OUTPATIENT
Start: 2023-02-01 | End: 2023-08-01 | Stop reason: SDUPTHER

## 2023-02-01 RX ORDER — OMEPRAZOLE 20 MG/1
20 CAPSULE, DELAYED RELEASE ORAL DAILY
Qty: 90 CAPSULE | Refills: 3 | Status: SHIPPED | OUTPATIENT
Start: 2023-02-01 | End: 2023-08-01 | Stop reason: SDUPTHER

## 2023-02-01 NOTE — PROGRESS NOTES
Subjective:       Patient ID: Janes Strange is a 52 y.o. male.    Chief Complaint: Follow-up    HPI: Pt presents to clinic today known to me with his father. He is non verbal. But does follow commands. His father reports that he has a cough on and off. Request refill of cough syrup. He has not had labs but father states he will bring him with him when he is due which is in 6 months     He has been getting weaker lately- requiring assistance to walk- today he is using his fathers rollator   Review of Systems   Respiratory:  Positive for cough (father reports).      Objective:      Physical Exam  Vitals and nursing note reviewed.   Constitutional:       Appearance: Normal appearance. He is well-developed.   HENT:      Head: Normocephalic and atraumatic.      Nose: Nose normal.   Eyes:      Conjunctiva/sclera: Conjunctivae normal.      Pupils: Pupils are equal, round, and reactive to light.   Neck:      Thyroid: No thyromegaly.      Vascular: No JVD.   Cardiovascular:      Rate and Rhythm: Normal rate and regular rhythm.      Heart sounds: Normal heart sounds. No murmur heard.  Pulmonary:      Effort: Pulmonary effort is normal. No respiratory distress.      Breath sounds: Normal breath sounds.   Abdominal:      General: Bowel sounds are normal. There is no distension.      Palpations: Abdomen is soft. There is no mass.      Tenderness: There is no abdominal tenderness. There is no guarding.   Musculoskeletal:         General: Normal range of motion.      Cervical back: Normal range of motion and neck supple.   Lymphadenopathy:      Cervical: No cervical adenopathy.   Skin:     General: Skin is warm and dry.      Coloration: Skin is not pale.      Findings: No rash.   Neurological:      Mental Status: He is alert and oriented to person, place, and time.      Cranial Nerves: No cranial nerve deficit.      Deep Tendon Reflexes: Reflexes are normal and symmetric.       Assessment:       1. Down syndrome    2.  Hyperlipidemia, unspecified hyperlipidemia type    3. Gastroesophageal reflux disease    4. H/O seasonal allergies    5. History of TIA (transient ischemic attack)    6. Idiopathic gout of knee, unspecified chronicity, unspecified laterality          Plan:     Problem List Items Addressed This Visit       Down syndrome - Primary    Gout    Relevant Medications    allopurinoL (ZYLOPRIM) 300 MG tablet    History of TIA (transient ischemic attack)    Relevant Medications    clopidogreL (PLAVIX) 75 mg tablet    H/O seasonal allergies    Relevant Medications    guaiFENesin-codeine 100-10 mg/5 ml (TUSSI-ORGANIDIN NR)  mg/5 mL syrup    HLD (hyperlipidemia)    Relevant Medications    rosuvastatin (CRESTOR) 10 MG tablet     Other Visit Diagnoses       Gastroesophageal reflux disease        Relevant Medications    omeprazole (PRILOSEC) 20 MG capsule          Labs already ordered will schedule for in 6 months with his father with f/u- refilled meds     Whitney is his sister that will assume care if something happens to father     Debility-- order rolling walker for patient

## 2023-02-06 DIAGNOSIS — Z88.9 H/O SEASONAL ALLERGIES: ICD-10-CM

## 2023-02-06 RX ORDER — CODEINE PHOSPHATE AND GUAIFENESIN 10; 100 MG/5ML; MG/5ML
5 SOLUTION ORAL 3 TIMES DAILY PRN
Qty: 120 ML | Refills: 0 | Status: SHIPPED | OUTPATIENT
Start: 2023-02-06 | End: 2023-08-01 | Stop reason: SDUPTHER

## 2023-02-06 NOTE — TELEPHONE ENCOUNTER
----- Message from Nikky Eugene sent at 2023 11:15 AM CST -----  Contact: katheryn Strange  MRN: [id number]  : 1970  PCP: Luna Kelly  Home Phone      [phone]  Work Phone      Not on file.  Mobile          [phone]      MESSAGE: Mr Marshall states [facility] didn't get the prescription for the cough meds for the pt. He is asking if we can please send it in.     [facility] [address] [place] [id number]  Phone: [phone] Fax: [phone]  Hours: Not open 24 hours    [phone]

## 2023-06-27 ENCOUNTER — PATIENT OUTREACH (OUTPATIENT)
Dept: ADMINISTRATIVE | Facility: HOSPITAL | Age: 53
End: 2023-06-27
Payer: MEDICARE

## 2023-06-27 DIAGNOSIS — Z13.1 SCREENING FOR DIABETES MELLITUS: ICD-10-CM

## 2023-06-27 DIAGNOSIS — E78.5 HYPERLIPIDEMIA, UNSPECIFIED HYPERLIPIDEMIA TYPE: Primary | ICD-10-CM

## 2023-06-27 NOTE — PROGRESS NOTES
Chart reviewed, immunization record updated.  No new results noted on [facility] or [facility] web site.  Care Everywhere updated.   Patient care coordination note updated.   Upcoming PCP visit updated.  Next PCP visit 08/01/2023.  LOV with PCP 02/01/2023.  Contacted patient's caregiver to discuss Colorectal cancer screening.    Patient has diagnosis of Down Syndrome.  Father, caregiver Mr. Paris states they are unable to complete colorectal home test and do not wish to schedule Colonoscopy.

## 2023-06-29 ENCOUNTER — TELEPHONE (OUTPATIENT)
Dept: INTERNAL MEDICINE | Facility: CLINIC | Age: 53
End: 2023-06-29
Payer: MEDICARE

## 2023-06-29 DIAGNOSIS — R53.81 DEBILITY: Primary | ICD-10-CM

## 2023-06-29 NOTE — TELEPHONE ENCOUNTER
Spoke with pt's father. States pt need a walker. Please place order and addend note to show need so I can print and send to [facility].

## 2023-06-29 NOTE — TELEPHONE ENCOUNTER
----- Message from Tisha Rogers sent at 2023  1:52 PM CDT -----  Contact: Father/ Wellington Strange  MRN: [id number]  : 1970  PCP: Luna Kelly  Home Phone      [phone]  Work Phone      Not on file.  Mobile          [phone]      MESSAGE:     Pt father Wellington called stating pt needs a walker and said there were some tests they spoke about and are wanting to speak about this.      Please advise  Wellington  [phone]

## 2023-07-26 ENCOUNTER — LAB VISIT (OUTPATIENT)
Dept: LAB | Facility: HOSPITAL | Age: 53
End: 2023-07-26
Attending: NURSE PRACTITIONER
Payer: MEDICARE

## 2023-07-26 DIAGNOSIS — E78.5 HYPERLIPIDEMIA, UNSPECIFIED HYPERLIPIDEMIA TYPE: ICD-10-CM

## 2023-07-26 DIAGNOSIS — E03.8 SUBCLINICAL HYPOTHYROIDISM: ICD-10-CM

## 2023-07-26 DIAGNOSIS — I25.10 CORONARY ARTERY DISEASE, UNSPECIFIED VESSEL OR LESION TYPE, UNSPECIFIED WHETHER ANGINA PRESENT, UNSPECIFIED WHETHER NATIVE OR TRANSPLANTED HEART: ICD-10-CM

## 2023-07-26 LAB
ALBUMIN SERPL BCP-MCNC: 3.7 G/DL (ref 3.5–5.2)
ALP SERPL-CCNC: 79 U/L (ref 55–135)
ALT SERPL W/O P-5'-P-CCNC: 15 U/L (ref 10–44)
ANION GAP SERPL CALC-SCNC: 9 MMOL/L (ref 8–16)
AST SERPL-CCNC: 21 U/L (ref 10–40)
BASOPHILS # BLD AUTO: 0.16 K/UL (ref 0–0.2)
BASOPHILS NFR BLD: 1.3 % (ref 0–1.9)
BILIRUB SERPL-MCNC: 0.6 MG/DL (ref 0.1–1)
BUN SERPL-MCNC: 19 MG/DL (ref 6–20)
CALCIUM SERPL-MCNC: 10.4 MG/DL (ref 8.7–10.5)
CHLORIDE SERPL-SCNC: 101 MMOL/L (ref 95–110)
CHOLEST SERPL-MCNC: 208 MG/DL (ref 120–199)
CHOLEST/HDLC SERPL: 4.4 {RATIO} (ref 2–5)
CO2 SERPL-SCNC: 30 MMOL/L (ref 23–29)
CREAT SERPL-MCNC: 1.1 MG/DL (ref 0.5–1.4)
DIFFERENTIAL METHOD: ABNORMAL
EOSINOPHIL # BLD AUTO: 0.1 K/UL (ref 0–0.5)
EOSINOPHIL NFR BLD: 0.9 % (ref 0–8)
ERYTHROCYTE [DISTWIDTH] IN BLOOD BY AUTOMATED COUNT: 14.7 % (ref 11.5–14.5)
EST. GFR  (NO RACE VARIABLE): >60 ML/MIN/1.73 M^2
GLUCOSE SERPL-MCNC: 104 MG/DL (ref 70–110)
HCT VFR BLD AUTO: 47.5 % (ref 40–54)
HDLC SERPL-MCNC: 47 MG/DL (ref 40–75)
HDLC SERPL: 22.6 % (ref 20–50)
HGB BLD-MCNC: 15.4 G/DL (ref 14–18)
IMM GRANULOCYTES # BLD AUTO: 0.05 K/UL (ref 0–0.04)
IMM GRANULOCYTES NFR BLD AUTO: 0.4 % (ref 0–0.5)
LDLC SERPL CALC-MCNC: 128 MG/DL (ref 63–159)
LYMPHOCYTES # BLD AUTO: 3.8 K/UL (ref 1–4.8)
LYMPHOCYTES NFR BLD: 31.4 % (ref 18–48)
MCH RBC QN AUTO: 29.7 PG (ref 27–31)
MCHC RBC AUTO-ENTMCNC: 32.4 G/DL (ref 32–36)
MCV RBC AUTO: 92 FL (ref 82–98)
MONOCYTES # BLD AUTO: 0.7 K/UL (ref 0.3–1)
MONOCYTES NFR BLD: 6.1 % (ref 4–15)
NEUTROPHILS # BLD AUTO: 7.2 K/UL (ref 1.8–7.7)
NEUTROPHILS NFR BLD: 59.9 % (ref 38–73)
NONHDLC SERPL-MCNC: 161 MG/DL
NRBC BLD-RTO: 0 /100 WBC
PLATELET # BLD AUTO: 293 K/UL (ref 150–450)
PMV BLD AUTO: 9.7 FL (ref 9.2–12.9)
POTASSIUM SERPL-SCNC: 4.4 MMOL/L (ref 3.5–5.1)
PROT SERPL-MCNC: 7.6 G/DL (ref 6–8.4)
RBC # BLD AUTO: 5.19 M/UL (ref 4.6–6.2)
SODIUM SERPL-SCNC: 140 MMOL/L (ref 136–145)
T4 FREE SERPL-MCNC: 0.92 NG/DL (ref 0.71–1.51)
TRIGL SERPL-MCNC: 165 MG/DL (ref 30–150)
TSH SERPL DL<=0.005 MIU/L-ACNC: 4.68 UIU/ML (ref 0.4–4)
WBC # BLD AUTO: 11.99 K/UL (ref 3.9–12.7)

## 2023-07-26 PROCEDURE — 85025 COMPLETE CBC W/AUTO DIFF WBC: CPT | Performed by: NURSE PRACTITIONER

## 2023-07-26 PROCEDURE — 84443 ASSAY THYROID STIM HORMONE: CPT | Performed by: NURSE PRACTITIONER

## 2023-07-26 PROCEDURE — 80061 LIPID PANEL: CPT | Performed by: NURSE PRACTITIONER

## 2023-07-26 PROCEDURE — 36415 COLL VENOUS BLD VENIPUNCTURE: CPT | Performed by: NURSE PRACTITIONER

## 2023-07-26 PROCEDURE — 80053 COMPREHEN METABOLIC PANEL: CPT | Performed by: NURSE PRACTITIONER

## 2023-07-26 PROCEDURE — 84439 ASSAY OF FREE THYROXINE: CPT | Performed by: NURSE PRACTITIONER

## 2023-08-01 ENCOUNTER — OFFICE VISIT (OUTPATIENT)
Dept: INTERNAL MEDICINE | Facility: CLINIC | Age: 53
End: 2023-08-01
Payer: MEDICARE

## 2023-08-01 ENCOUNTER — TELEPHONE (OUTPATIENT)
Dept: INTERNAL MEDICINE | Facility: CLINIC | Age: 53
End: 2023-08-01

## 2023-08-01 VITALS
HEIGHT: 60 IN | HEART RATE: 95 BPM | DIASTOLIC BLOOD PRESSURE: 78 MMHG | BODY MASS INDEX: 25.94 KG/M2 | RESPIRATION RATE: 18 BRPM | SYSTOLIC BLOOD PRESSURE: 124 MMHG

## 2023-08-01 DIAGNOSIS — Z88.9 H/O SEASONAL ALLERGIES: ICD-10-CM

## 2023-08-01 DIAGNOSIS — R53.81 PHYSICAL DEBILITY: ICD-10-CM

## 2023-08-01 DIAGNOSIS — M10.069 IDIOPATHIC GOUT OF KNEE, UNSPECIFIED CHRONICITY, UNSPECIFIED LATERALITY: ICD-10-CM

## 2023-08-01 DIAGNOSIS — R79.89 ABNORMAL TSH: ICD-10-CM

## 2023-08-01 DIAGNOSIS — Q90.9 DOWN SYNDROME: Primary | ICD-10-CM

## 2023-08-01 DIAGNOSIS — K21.9 GASTROESOPHAGEAL REFLUX DISEASE: ICD-10-CM

## 2023-08-01 DIAGNOSIS — Z86.73 HISTORY OF TIA (TRANSIENT ISCHEMIC ATTACK): ICD-10-CM

## 2023-08-01 DIAGNOSIS — R79.89 ELEVATED TSH: ICD-10-CM

## 2023-08-01 DIAGNOSIS — E78.5 HYPERLIPIDEMIA, UNSPECIFIED HYPERLIPIDEMIA TYPE: ICD-10-CM

## 2023-08-01 DIAGNOSIS — R53.83 OTHER FATIGUE: ICD-10-CM

## 2023-08-01 PROCEDURE — 3078F PR MOST RECENT DIASTOLIC BLOOD PRESSURE < 80 MM HG: ICD-10-PCS | Mod: CPTII,S$GLB,, | Performed by: NURSE PRACTITIONER

## 2023-08-01 PROCEDURE — 3008F BODY MASS INDEX DOCD: CPT | Mod: CPTII,S$GLB,, | Performed by: NURSE PRACTITIONER

## 2023-08-01 PROCEDURE — 3074F PR MOST RECENT SYSTOLIC BLOOD PRESSURE < 130 MM HG: ICD-10-PCS | Mod: CPTII,S$GLB,, | Performed by: NURSE PRACTITIONER

## 2023-08-01 PROCEDURE — 99214 OFFICE O/P EST MOD 30 MIN: CPT | Mod: S$GLB,,, | Performed by: NURSE PRACTITIONER

## 2023-08-01 PROCEDURE — 1160F PR REVIEW ALL MEDS BY PRESCRIBER/CLIN PHARMACIST DOCUMENTED: ICD-10-PCS | Mod: CPTII,S$GLB,, | Performed by: NURSE PRACTITIONER

## 2023-08-01 PROCEDURE — 1160F RVW MEDS BY RX/DR IN RCRD: CPT | Mod: CPTII,S$GLB,, | Performed by: NURSE PRACTITIONER

## 2023-08-01 PROCEDURE — 3008F PR BODY MASS INDEX (BMI) DOCUMENTED: ICD-10-PCS | Mod: CPTII,S$GLB,, | Performed by: NURSE PRACTITIONER

## 2023-08-01 PROCEDURE — 99999 PR PBB SHADOW E&M-EST. PATIENT-LVL III: CPT | Mod: PBBFAC,,, | Performed by: NURSE PRACTITIONER

## 2023-08-01 PROCEDURE — 99999 PR PBB SHADOW E&M-EST. PATIENT-LVL III: ICD-10-PCS | Mod: PBBFAC,,, | Performed by: NURSE PRACTITIONER

## 2023-08-01 PROCEDURE — 99214 PR OFFICE/OUTPT VISIT, EST, LEVL IV, 30-39 MIN: ICD-10-PCS | Mod: S$GLB,,, | Performed by: NURSE PRACTITIONER

## 2023-08-01 PROCEDURE — 3074F SYST BP LT 130 MM HG: CPT | Mod: CPTII,S$GLB,, | Performed by: NURSE PRACTITIONER

## 2023-08-01 PROCEDURE — 1159F MED LIST DOCD IN RCRD: CPT | Mod: CPTII,S$GLB,, | Performed by: NURSE PRACTITIONER

## 2023-08-01 PROCEDURE — 1159F PR MEDICATION LIST DOCUMENTED IN MEDICAL RECORD: ICD-10-PCS | Mod: CPTII,S$GLB,, | Performed by: NURSE PRACTITIONER

## 2023-08-01 PROCEDURE — 3078F DIAST BP <80 MM HG: CPT | Mod: CPTII,S$GLB,, | Performed by: NURSE PRACTITIONER

## 2023-08-01 RX ORDER — ALLOPURINOL 300 MG/1
300 TABLET ORAL DAILY
Qty: 90 TABLET | Refills: 3 | Status: SHIPPED | OUTPATIENT
Start: 2023-08-01

## 2023-08-01 RX ORDER — MUPIROCIN 20 MG/G
OINTMENT TOPICAL 3 TIMES DAILY
Qty: 30 G | Refills: 0 | Status: SHIPPED | OUTPATIENT
Start: 2023-08-01

## 2023-08-01 RX ORDER — CLOPIDOGREL BISULFATE 75 MG/1
75 TABLET ORAL DAILY
Qty: 90 TABLET | Refills: 3 | Status: SHIPPED | OUTPATIENT
Start: 2023-08-01

## 2023-08-01 RX ORDER — OMEPRAZOLE 20 MG/1
20 CAPSULE, DELAYED RELEASE ORAL DAILY
Qty: 90 CAPSULE | Refills: 3 | Status: SHIPPED | OUTPATIENT
Start: 2023-08-01

## 2023-08-01 RX ORDER — LORATADINE 10 MG/1
10 TABLET ORAL DAILY
Qty: 90 TABLET | Refills: 3 | Status: SHIPPED | OUTPATIENT
Start: 2023-08-01

## 2023-08-01 RX ORDER — ROSUVASTATIN CALCIUM 10 MG/1
10 TABLET, COATED ORAL DAILY
Qty: 90 TABLET | Refills: 3 | Status: SHIPPED | OUTPATIENT
Start: 2023-08-01 | End: 2024-02-21

## 2023-08-01 RX ORDER — CODEINE PHOSPHATE AND GUAIFENESIN 10; 100 MG/5ML; MG/5ML
5 SOLUTION ORAL 3 TIMES DAILY PRN
Qty: 120 ML | Refills: 0 | Status: SHIPPED | OUTPATIENT
Start: 2023-08-01 | End: 2024-01-23 | Stop reason: SDUPTHER

## 2023-08-01 NOTE — PROGRESS NOTES
Subjective:       Patient ID: Janes Strange is a 53 y.o. male.    Chief Complaint: Follow-up (6 months)    HPI: Pt presents to clinic today known to me with his father who is his main caretaker.   Lab Results   Component Value Date    WBC 11.99 07/26/2023    HGB 15.4 07/26/2023    HCT 47.5 07/26/2023    MCV 92 07/26/2023     07/26/2023     BMP  Lab Results   Component Value Date     07/26/2023    K 4.4 07/26/2023     07/26/2023    CO2 30 (H) 07/26/2023    BUN 19 07/26/2023    CREATININE 1.1 07/26/2023    CALCIUM 10.4 07/26/2023    ANIONGAP 9 07/26/2023    EGFRNORACEVR >60 07/26/2023     Lab Results   Component Value Date    ALT 15 07/26/2023    AST 21 07/26/2023    ALKPHOS 79 07/26/2023    BILITOT 0.6 07/26/2023     Lab Results   Component Value Date    TSH 4.676 (H) 07/26/2023   Free t4 0.92     Total chol 208, trig 165, hdl 47, ldl 128     He would like a refill on cough medication. Does not use often but when it satrts using cough syrup and nebulizer and it helps stop it from worsening       Only complaint is he sleep a lot   Review of Systems   Constitutional:  Positive for activity change (using fathers walker). Negative for fever.   Respiratory:  Negative for cough (coughs off and on at times) and shortness of breath.    Cardiovascular:  Negative for chest pain and palpitations.   Gastrointestinal:  Negative for constipation, diarrhea, nausea and vomiting.   Genitourinary:  Negative for dysuria and hematuria.   Musculoskeletal:  Positive for gait problem. Negative for back pain.   Skin:  Negative for rash and wound.   Neurological:  Negative for dizziness, weakness and light-headedness.       Objective:      Physical Exam  Vitals and nursing note reviewed.   Constitutional:       Appearance: Normal appearance.   HENT:      Head: Normocephalic and atraumatic.   Cardiovascular:      Rate and Rhythm: Normal rate and regular rhythm.   Pulmonary:      Effort: Pulmonary effort is normal.       Breath sounds: Normal breath sounds.   Abdominal:      Palpations: Abdomen is soft.   Musculoskeletal:         General: No swelling. Normal range of motion.   Skin:     General: Skin is warm.      Capillary Refill: Capillary refill takes less than 2 seconds.      Findings: No bruising.   Neurological:      General: No focal deficit present.      Mental Status: He is alert and oriented to person, place, and time.   Psychiatric:         Mood and Affect: Mood normal.         Behavior: Behavior normal.         Thought Content: Thought content normal.         Judgment: Judgment normal.         Assessment:       1. Down syndrome    2. H/O seasonal allergies    3. Idiopathic gout of knee, unspecified chronicity, unspecified laterality    4. History of TIA (transient ischemic attack)    5. Gastroesophageal reflux disease    6. Hyperlipidemia, unspecified hyperlipidemia type    7. Physical debility        Plan:     Problem List Items Addressed This Visit       Down syndrome - Primary    Relevant Orders    WALKER FOR HOME USE    Gout    Relevant Medications    allopurinoL (ZYLOPRIM) 300 MG tablet    History of TIA (transient ischemic attack)    Relevant Medications    clopidogreL (PLAVIX) 75 mg tablet    H/O seasonal allergies    Relevant Medications    guaiFENesin-codeine 100-10 mg/5 ml (TUSSI-ORGANIDIN NR)  mg/5 mL syrup    loratadine (CLARITIN) 10 mg tablet    HLD (hyperlipidemia)    Relevant Medications    rosuvastatin (CRESTOR) 10 MG tablet     Other Visit Diagnoses       Gastroesophageal reflux disease        Relevant Medications    omeprazole (PRILOSEC) 20 MG capsule    Physical debility        Relevant Orders    WALKER FOR HOME USE            RTC 6 months with labs   Rx for rollator using his father  for support to walk indepemndent of another person needs that support and balance. His fathers does not have brakes and has caused  falls

## 2023-08-10 ENCOUNTER — TELEPHONE (OUTPATIENT)
Dept: INTERNAL MEDICINE | Facility: CLINIC | Age: 53
End: 2023-08-10
Payer: MEDICARE

## 2023-08-10 RX ORDER — DOXYCYCLINE 100 MG/1
100 CAPSULE ORAL 2 TIMES DAILY
Qty: 20 CAPSULE | Refills: 0 | Status: SHIPPED | OUTPATIENT
Start: 2023-08-10 | End: 2024-01-31

## 2023-08-10 NOTE — TELEPHONE ENCOUNTER
"Spoke with dad. Bactroban ointment is not helping with the "pimples down below" and didn't know if he will need an antibiotic or something else sent in for him.     He has also not heard anything about pt's walker (dad is getting his tomorrow). Paperwork refaxed to [facility].   " Yes

## 2023-08-10 NOTE — TELEPHONE ENCOUNTER
We can try a course of doxy- it looked like a folliculitis when he was here. Sent to pharmacy on file

## 2023-08-10 NOTE — TELEPHONE ENCOUNTER
----- Message from Nikky Eugene sent at 8/10/2023 12:10 PM CDT -----  Contact: Wellington Strange  MRN: [id number]  : 1970  PCP: Luna Kelly  Home Phone      [phone]  Work Phone      Not on file.  Mobile          [phone]      MESSAGE: Mr Paris called to say he needs to speak to Yana and get information about Janes before luna is off tomorrow. Please return the call       [phone]

## 2023-08-14 DIAGNOSIS — R53.81 DEBILITY: Primary | ICD-10-CM

## 2023-08-18 ENCOUNTER — TELEPHONE (OUTPATIENT)
Dept: INTERNAL MEDICINE | Facility: CLINIC | Age: 53
End: 2023-08-18
Payer: MEDICARE

## 2023-08-18 NOTE — TELEPHONE ENCOUNTER
----- Message from Desire Mayer LPN sent at 8/18/2023 10:58 AM CDT -----  Regarding: Rollator Walker  Patient's father called and stated the wrong type of Walker was delivered for Janes.    He would like the Rollator walker, with the seat and brakes to be delivered.     Requesting we make changes to the paperwork (as per the  told him to do) and refax to [facility] for the Rollator.      Thank you,  Desire Mayer LPN

## 2023-08-18 NOTE — TELEPHONE ENCOUNTER
Luna had ordered the rollator walker on 8/14/23. Order printed and faxed to [facility]. Patient's father notified.

## 2024-01-23 ENCOUNTER — TELEPHONE (OUTPATIENT)
Dept: INTERNAL MEDICINE | Facility: CLINIC | Age: 54
End: 2024-01-23
Payer: MEDICARE

## 2024-01-23 DIAGNOSIS — Z88.9 H/O SEASONAL ALLERGIES: ICD-10-CM

## 2024-01-23 RX ORDER — CODEINE PHOSPHATE AND GUAIFENESIN 10; 100 MG/5ML; MG/5ML
5 SOLUTION ORAL 3 TIMES DAILY PRN
Qty: 120 ML | Refills: 0 | Status: SHIPPED | OUTPATIENT
Start: 2024-01-23 | End: 2024-01-31

## 2024-01-23 NOTE — TELEPHONE ENCOUNTER
----- Message from Nikky Eugene sent at 2024  9:09 AM CST -----  Contact: Rolando Strange  MRN: [id number]  : 1970  PCP: Luna Kelly  Home Phone      [phone]  Work Phone      Not on file.  Mobile          [phone]      MESSAGE: Mr. Marshall called to say if he wasn't in the hospital, he could come to the office and request his and Janes's cough syrup be filled. Janes is starting to cough per Shoshana. Please refill the cough medicine         [facility] [address] [place], [place] [address] [address] [address]  Phone: [phone] Fax: [phone]  Hours: Not open 24 hours

## 2024-01-31 ENCOUNTER — OFFICE VISIT (OUTPATIENT)
Dept: INTERNAL MEDICINE | Facility: CLINIC | Age: 54
End: 2024-01-31
Payer: MEDICARE

## 2024-01-31 VITALS
BODY MASS INDEX: 25.94 KG/M2 | SYSTOLIC BLOOD PRESSURE: 124 MMHG | HEART RATE: 89 BPM | RESPIRATION RATE: 18 BRPM | HEIGHT: 60 IN | DIASTOLIC BLOOD PRESSURE: 72 MMHG

## 2024-01-31 DIAGNOSIS — R05.3 CHRONIC COUGH: ICD-10-CM

## 2024-01-31 DIAGNOSIS — Z86.73 HISTORY OF TIA (TRANSIENT ISCHEMIC ATTACK): ICD-10-CM

## 2024-01-31 DIAGNOSIS — Q90.9 DOWN SYNDROME: Primary | ICD-10-CM

## 2024-01-31 DIAGNOSIS — E78.5 HYPERLIPIDEMIA, UNSPECIFIED HYPERLIPIDEMIA TYPE: ICD-10-CM

## 2024-01-31 PROCEDURE — 99214 OFFICE O/P EST MOD 30 MIN: CPT | Mod: S$GLB,,, | Performed by: NURSE PRACTITIONER

## 2024-01-31 PROCEDURE — 99999 PR PBB SHADOW E&M-EST. PATIENT-LVL III: CPT | Mod: PBBFAC,,, | Performed by: NURSE PRACTITIONER

## 2024-01-31 RX ORDER — PROMETHAZINE HYDROCHLORIDE AND DEXTROMETHORPHAN HYDROBROMIDE 6.25; 15 MG/5ML; MG/5ML
5 SYRUP ORAL EVERY 6 HOURS PRN
Qty: 180 ML | Refills: 0 | Status: SHIPPED | OUTPATIENT
Start: 2024-01-31 | End: 2024-02-10

## 2024-01-31 NOTE — PROGRESS NOTES
Subjective:       Patient ID: Janes Strange is a 53 y.o. male.    Chief Complaint: Follow-up (6 months)    HPI: Pt presents to clinic today known to me with c.o needing routine visit. He is here with his dad. He is due for labs. Could not have them done prior to visit as his father was in hospital. He has no complaints- hos father says he still has the chronic cough but codeine cough syrup is unavailable  Review of Systems   Constitutional:  Negative for chills and fever.   HENT:  Negative for congestion, postnasal drip and sore throat.    Eyes:  Negative for photophobia.   Respiratory:  Negative for chest tightness and shortness of breath.    Cardiovascular:  Negative for chest pain.   Gastrointestinal:  Negative for abdominal distention, abdominal pain, blood in stool and vomiting.   Genitourinary:  Negative for dysuria, flank pain and hematuria.   Musculoskeletal:  Negative for back pain.   Skin:  Negative for pallor.   Neurological:  Negative for dizziness, seizures, facial asymmetry, speech difficulty and numbness.   Hematological:  Does not bruise/bleed easily.   Psychiatric/Behavioral:  Negative for agitation and suicidal ideas. The patient is not nervous/anxious.        Objective:      Physical Exam  Vitals and nursing note reviewed.   Constitutional:       Appearance: He is well-developed. He is obese.   HENT:      Head: Normocephalic and atraumatic.      Nose: Nose normal.   Eyes:      Conjunctiva/sclera: Conjunctivae normal.      Pupils: Pupils are equal, round, and reactive to light.   Neck:      Thyroid: No thyromegaly.      Vascular: No JVD.   Cardiovascular:      Rate and Rhythm: Normal rate and regular rhythm.      Heart sounds: Normal heart sounds. No murmur heard.  Pulmonary:      Effort: Pulmonary effort is normal. No respiratory distress.      Breath sounds: Normal breath sounds. No wheezing.   Abdominal:      General: Bowel sounds are normal. There is no distension.      Palpations: Abdomen is  soft. There is no mass.      Tenderness: There is no abdominal tenderness. There is no guarding.   Musculoskeletal:         General: No swelling. Normal range of motion.      Cervical back: Normal range of motion and neck supple.   Lymphadenopathy:      Cervical: No cervical adenopathy.   Skin:     General: Skin is warm and dry.      Capillary Refill: Capillary refill takes less than 2 seconds.      Coloration: Skin is not pale.      Findings: No rash.   Neurological:      Mental Status: He is alert. Mental status is at baseline.      Deep Tendon Reflexes: Reflexes are normal and symmetric.         Assessment:       1. Down syndrome    2. Hyperlipidemia, unspecified hyperlipidemia type    3. History of TIA (transient ischemic attack)    4. Chronic cough        Plan:     Problem List Items Addressed This Visit       Down syndrome - Primary    History of TIA (transient ischemic attack)    HLD (hyperlipidemia)     Other Visit Diagnoses       Chronic cough        Relevant Medications    promethazine-dextromethorphan (PROMETHAZINE-DM) 6.25-15 mg/5 mL Syrp          Cont same meds and treatment- will check l;ab at next visit his father has which is 2/16- adjust as needed

## 2024-02-06 DIAGNOSIS — Z12.11 COLON CANCER SCREENING: ICD-10-CM

## 2024-02-20 ENCOUNTER — LAB VISIT (OUTPATIENT)
Dept: LAB | Facility: HOSPITAL | Age: 54
End: 2024-02-20
Attending: NURSE PRACTITIONER
Payer: MEDICARE

## 2024-02-20 DIAGNOSIS — R79.89 ABNORMAL TSH: ICD-10-CM

## 2024-02-20 DIAGNOSIS — E78.5 HYPERLIPIDEMIA, UNSPECIFIED HYPERLIPIDEMIA TYPE: ICD-10-CM

## 2024-02-20 DIAGNOSIS — Q90.9 DOWN SYNDROME: ICD-10-CM

## 2024-02-20 DIAGNOSIS — R79.89 ELEVATED TSH: ICD-10-CM

## 2024-02-20 DIAGNOSIS — M10.069 IDIOPATHIC GOUT OF KNEE, UNSPECIFIED CHRONICITY, UNSPECIFIED LATERALITY: ICD-10-CM

## 2024-02-20 DIAGNOSIS — R53.83 OTHER FATIGUE: ICD-10-CM

## 2024-02-20 DIAGNOSIS — Z86.73 HISTORY OF TIA (TRANSIENT ISCHEMIC ATTACK): ICD-10-CM

## 2024-02-20 LAB
ALBUMIN SERPL BCP-MCNC: 3.7 G/DL (ref 3.5–5.2)
ALP SERPL-CCNC: 84 U/L (ref 55–135)
ALT SERPL W/O P-5'-P-CCNC: 19 U/L (ref 10–44)
ANION GAP SERPL CALC-SCNC: 10 MMOL/L (ref 8–16)
AST SERPL-CCNC: 22 U/L (ref 10–40)
BASOPHILS # BLD AUTO: 0.11 K/UL (ref 0–0.2)
BASOPHILS NFR BLD: 1 % (ref 0–1.9)
BILIRUB SERPL-MCNC: 0.7 MG/DL (ref 0.1–1)
BUN SERPL-MCNC: 15 MG/DL (ref 6–20)
CALCIUM SERPL-MCNC: 9.8 MG/DL (ref 8.7–10.5)
CHLORIDE SERPL-SCNC: 105 MMOL/L (ref 95–110)
CHOLEST SERPL-MCNC: 210 MG/DL (ref 120–199)
CHOLEST/HDLC SERPL: 4.9 {RATIO} (ref 2–5)
CO2 SERPL-SCNC: 27 MMOL/L (ref 23–29)
CREAT SERPL-MCNC: 1 MG/DL (ref 0.5–1.4)
DIFFERENTIAL METHOD BLD: NORMAL
EOSINOPHIL # BLD AUTO: 0.1 K/UL (ref 0–0.5)
EOSINOPHIL NFR BLD: 0.6 % (ref 0–8)
ERYTHROCYTE [DISTWIDTH] IN BLOOD BY AUTOMATED COUNT: 14.4 % (ref 11.5–14.5)
EST. GFR  (NO RACE VARIABLE): >60 ML/MIN/1.73 M^2
GLUCOSE SERPL-MCNC: 99 MG/DL (ref 70–110)
HCT VFR BLD AUTO: 47.2 % (ref 40–54)
HDLC SERPL-MCNC: 43 MG/DL (ref 40–75)
HDLC SERPL: 20.5 % (ref 20–50)
HGB BLD-MCNC: 15.7 G/DL (ref 14–18)
IMM GRANULOCYTES # BLD AUTO: 0.04 K/UL (ref 0–0.04)
IMM GRANULOCYTES NFR BLD AUTO: 0.4 % (ref 0–0.5)
LDLC SERPL CALC-MCNC: 125.4 MG/DL (ref 63–159)
LYMPHOCYTES # BLD AUTO: 3.2 K/UL (ref 1–4.8)
LYMPHOCYTES NFR BLD: 30 % (ref 18–48)
MCH RBC QN AUTO: 30.3 PG (ref 27–31)
MCHC RBC AUTO-ENTMCNC: 33.3 G/DL (ref 32–36)
MCV RBC AUTO: 91 FL (ref 82–98)
MONOCYTES # BLD AUTO: 0.5 K/UL (ref 0.3–1)
MONOCYTES NFR BLD: 5.1 % (ref 4–15)
NEUTROPHILS # BLD AUTO: 6.7 K/UL (ref 1.8–7.7)
NEUTROPHILS NFR BLD: 62.9 % (ref 38–73)
NONHDLC SERPL-MCNC: 167 MG/DL
NRBC BLD-RTO: 0 /100 WBC
PLATELET # BLD AUTO: 284 K/UL (ref 150–450)
PMV BLD AUTO: 9.8 FL (ref 9.2–12.9)
POTASSIUM SERPL-SCNC: 4.1 MMOL/L (ref 3.5–5.1)
PROT SERPL-MCNC: 7.7 G/DL (ref 6–8.4)
RBC # BLD AUTO: 5.19 M/UL (ref 4.6–6.2)
SODIUM SERPL-SCNC: 142 MMOL/L (ref 136–145)
T4 FREE SERPL-MCNC: 0.9 NG/DL (ref 0.71–1.51)
TRIGL SERPL-MCNC: 208 MG/DL (ref 30–150)
TSH SERPL DL<=0.005 MIU/L-ACNC: 4.62 UIU/ML (ref 0.4–4)
URATE SERPL-MCNC: 4.1 MG/DL (ref 3.4–7)
WBC # BLD AUTO: 10.67 K/UL (ref 3.9–12.7)

## 2024-02-20 PROCEDURE — 85025 COMPLETE CBC W/AUTO DIFF WBC: CPT | Performed by: NURSE PRACTITIONER

## 2024-02-20 PROCEDURE — 84443 ASSAY THYROID STIM HORMONE: CPT | Performed by: NURSE PRACTITIONER

## 2024-02-20 PROCEDURE — 80061 LIPID PANEL: CPT | Performed by: NURSE PRACTITIONER

## 2024-02-20 PROCEDURE — 84550 ASSAY OF BLOOD/URIC ACID: CPT | Performed by: NURSE PRACTITIONER

## 2024-02-20 PROCEDURE — 36415 COLL VENOUS BLD VENIPUNCTURE: CPT | Performed by: NURSE PRACTITIONER

## 2024-02-20 PROCEDURE — 84439 ASSAY OF FREE THYROXINE: CPT | Performed by: NURSE PRACTITIONER

## 2024-02-20 PROCEDURE — 80053 COMPREHEN METABOLIC PANEL: CPT | Performed by: NURSE PRACTITIONER

## 2024-02-21 DIAGNOSIS — E78.5 HYPERLIPIDEMIA, UNSPECIFIED HYPERLIPIDEMIA TYPE: ICD-10-CM

## 2024-02-21 RX ORDER — ROSUVASTATIN CALCIUM 20 MG/1
20 TABLET, COATED ORAL DAILY
Qty: 90 TABLET | Refills: 1 | Status: SHIPPED | OUTPATIENT
Start: 2024-02-21

## 2024-04-22 ENCOUNTER — PATIENT OUTREACH (OUTPATIENT)
Dept: ADMINISTRATIVE | Facility: HOSPITAL | Age: 54
End: 2024-04-22
Payer: MEDICARE

## 2024-04-22 NOTE — PROGRESS NOTES
Chart reviewed, immunization record updated.  No new results noted on [facility] or [facility] web site.  Care Everywhere updated.   Patient care coordination note updated.  Upcoming PCP visit updated.  Next PCP visit 07/31/2024.  LOV with PCP 01/31/2024.  Attempted to contact patient to discuss Colorectal Cancer Screening and Osteoporosis Screening.  Recording stating Number not in service.

## 2024-06-04 ENCOUNTER — TELEPHONE (OUTPATIENT)
Dept: INTERNAL MEDICINE | Facility: CLINIC | Age: 54
End: 2024-06-04
Payer: MEDICARE

## 2024-06-04 RX ORDER — PROMETHAZINE HYDROCHLORIDE AND DEXTROMETHORPHAN HYDROBROMIDE 6.25; 15 MG/5ML; MG/5ML
5 SYRUP ORAL EVERY 4 HOURS PRN
Qty: 120 ML | Refills: 0 | Status: SHIPPED | OUTPATIENT
Start: 2024-06-04 | End: 2024-06-14

## 2024-06-04 NOTE — TELEPHONE ENCOUNTER
Please see message received below and advise. Message was from dad's chart.     ----- Message from Florence Kaufman, Patient Care Assistant sent at 2024  3:31 PM CDT -----  Regarding: Medication.  Pt is asking for him and his son, Janes Strange (- 1970) , to be called in some cough medicine. Pt can be reached @ [phone]. Thanks in advance.

## 2024-06-11 DIAGNOSIS — Z86.73 HISTORY OF TIA (TRANSIENT ISCHEMIC ATTACK): ICD-10-CM

## 2024-06-11 RX ORDER — CLOPIDOGREL BISULFATE 75 MG/1
75 TABLET ORAL
Qty: 90 TABLET | Refills: 0 | Status: SHIPPED | OUTPATIENT
Start: 2024-06-11

## 2024-06-21 ENCOUNTER — TELEPHONE (OUTPATIENT)
Dept: INTERNAL MEDICINE | Facility: CLINIC | Age: 54
End: 2024-06-21
Payer: MEDICARE

## 2024-06-21 NOTE — TELEPHONE ENCOUNTER
----- Message from Tisha Rogers sent at 2024  2:48 PM CDT -----  Contact: Rolando/ father  Janes Strange  MRN: [id number]  : 1970  PCP: Luna Kelly  Home Phone      [phone]  Work Phone      Not on file.  Mobile          [phone]      MESSAGE:     Pt father Rolando would like pt to get home health set up. They are wanting this done for this weekend.     Please advise   [phone]

## 2024-06-24 ENCOUNTER — HOSPITAL ENCOUNTER (INPATIENT)
Facility: HOSPITAL | Age: 54
LOS: 6 days | Discharge: HOME | DRG: 871 | End: 2024-07-01
Attending: EMERGENCY MEDICINE | Admitting: INTERNAL MEDICINE
Payer: MEDICARE

## 2024-06-24 DIAGNOSIS — J96.01 ACUTE RESPIRATORY FAILURE WITH HYPOXIA: ICD-10-CM

## 2024-06-24 DIAGNOSIS — R05.9 COUGH: ICD-10-CM

## 2024-06-24 DIAGNOSIS — R09.02 HYPOXIA: ICD-10-CM

## 2024-06-24 DIAGNOSIS — R00.0 TACHYCARDIA: ICD-10-CM

## 2024-06-24 DIAGNOSIS — U07.1 COVID-19: ICD-10-CM

## 2024-06-24 DIAGNOSIS — M10.9 GOUT, ARTHRITIS: Primary | ICD-10-CM

## 2024-06-24 DIAGNOSIS — A41.9 SEPSIS DUE TO PNEUMONIA: ICD-10-CM

## 2024-06-24 DIAGNOSIS — J18.9 SEPSIS DUE TO PNEUMONIA: ICD-10-CM

## 2024-06-24 PROBLEM — R65.20 SEVERE SEPSIS: Status: ACTIVE | Noted: 2024-06-24

## 2024-06-24 PROBLEM — R79.89 ELEVATED LACTIC ACID LEVEL: Status: ACTIVE | Noted: 2024-06-24

## 2024-06-24 LAB
ALBUMIN SERPL BCP-MCNC: 3.2 G/DL (ref 3.5–5.2)
ALP SERPL-CCNC: 72 U/L (ref 55–135)
ALT SERPL W/O P-5'-P-CCNC: 29 U/L (ref 10–44)
ANION GAP SERPL CALC-SCNC: 14 MMOL/L (ref 8–16)
ANION GAP SERPL CALC-SCNC: 15 MMOL/L (ref 8–16)
AST SERPL-CCNC: 31 U/L (ref 10–40)
BACTERIA #/AREA URNS HPF: NORMAL /HPF
BASOPHILS # BLD AUTO: 0.06 K/UL (ref 0–0.2)
BASOPHILS NFR BLD: 0.4 % (ref 0–1.9)
BILIRUB SERPL-MCNC: 0.4 MG/DL (ref 0.1–1)
BILIRUB UR QL STRIP: NEGATIVE
BNP SERPL-MCNC: 10 PG/ML (ref 0–99)
BUN SERPL-MCNC: 15 MG/DL (ref 6–20)
BUN SERPL-MCNC: 21 MG/DL (ref 6–20)
CALCIUM SERPL-MCNC: 8.5 MG/DL (ref 8.7–10.5)
CALCIUM SERPL-MCNC: 9.7 MG/DL (ref 8.7–10.5)
CHLORIDE SERPL-SCNC: 102 MMOL/L (ref 95–110)
CHLORIDE SERPL-SCNC: 106 MMOL/L (ref 95–110)
CK SERPL-CCNC: 34 U/L (ref 20–200)
CLARITY UR: CLEAR
CO2 SERPL-SCNC: 20 MMOL/L (ref 23–29)
CO2 SERPL-SCNC: 24 MMOL/L (ref 23–29)
COLOR UR: YELLOW
CREAT SERPL-MCNC: 0.9 MG/DL (ref 0.5–1.4)
CREAT SERPL-MCNC: 1.2 MG/DL (ref 0.5–1.4)
D DIMER PPP IA.FEU-MCNC: 0.72 MG/L FEU
DIFFERENTIAL METHOD BLD: ABNORMAL
EOSINOPHIL # BLD AUTO: 0 K/UL (ref 0–0.5)
EOSINOPHIL NFR BLD: 0 % (ref 0–8)
ERYTHROCYTE [DISTWIDTH] IN BLOOD BY AUTOMATED COUNT: 14.7 % (ref 11.5–14.5)
EST. GFR  (NO RACE VARIABLE): >60 ML/MIN/1.73 M^2
EST. GFR  (NO RACE VARIABLE): >60 ML/MIN/1.73 M^2
GLUCOSE SERPL-MCNC: 119 MG/DL (ref 70–110)
GLUCOSE SERPL-MCNC: 154 MG/DL (ref 70–110)
GLUCOSE UR QL STRIP: NEGATIVE
GROUP A STREP, MOLECULAR: NEGATIVE
HCT VFR BLD AUTO: 47.7 % (ref 40–54)
HGB BLD-MCNC: 15.7 G/DL (ref 14–18)
HGB UR QL STRIP: NEGATIVE
HYALINE CASTS #/AREA URNS LPF: 0 /LPF
IMM GRANULOCYTES # BLD AUTO: 0.25 K/UL (ref 0–0.04)
IMM GRANULOCYTES NFR BLD AUTO: 1.8 % (ref 0–0.5)
INFLUENZA A, MOLECULAR: NEGATIVE
INFLUENZA B, MOLECULAR: NEGATIVE
KETONES UR QL STRIP: NEGATIVE
LACTATE SERPL-SCNC: 2.8 MMOL/L (ref 0.5–2.2)
LACTATE SERPL-SCNC: 3.1 MMOL/L (ref 0.5–2.2)
LACTATE SERPL-SCNC: 3.2 MMOL/L (ref 0.5–2.2)
LACTATE SERPL-SCNC: 4.1 MMOL/L (ref 0.5–2.2)
LACTATE SERPL-SCNC: 4.5 MMOL/L (ref 0.5–2.2)
LEUKOCYTE ESTERASE UR QL STRIP: NEGATIVE
LYMPHOCYTES # BLD AUTO: 3.6 K/UL (ref 1–4.8)
LYMPHOCYTES NFR BLD: 25.7 % (ref 18–48)
MCH RBC QN AUTO: 29.1 PG (ref 27–31)
MCHC RBC AUTO-ENTMCNC: 32.9 G/DL (ref 32–36)
MCV RBC AUTO: 89 FL (ref 82–98)
MICROSCOPIC COMMENT: NORMAL
MONOCYTES # BLD AUTO: 0.7 K/UL (ref 0.3–1)
MONOCYTES NFR BLD: 5.2 % (ref 4–15)
NEUTROPHILS # BLD AUTO: 9.5 K/UL (ref 1.8–7.7)
NEUTROPHILS NFR BLD: 66.9 % (ref 38–73)
NITRITE UR QL STRIP: NEGATIVE
NRBC BLD-RTO: 0 /100 WBC
OHS QRS DURATION: 54 MS
OHS QTC CALCULATION: 432 MS
PH UR STRIP: 7 [PH] (ref 5–8)
PLATELET # BLD AUTO: 415 K/UL (ref 150–450)
PMV BLD AUTO: 10.1 FL (ref 9.2–12.9)
POTASSIUM SERPL-SCNC: 4.1 MMOL/L (ref 3.5–5.1)
POTASSIUM SERPL-SCNC: 4.4 MMOL/L (ref 3.5–5.1)
PROCALCITONIN SERPL IA-MCNC: 0.03 NG/ML
PROT SERPL-MCNC: 7.7 G/DL (ref 6–8.4)
PROT UR QL STRIP: ABNORMAL
RBC # BLD AUTO: 5.39 M/UL (ref 4.6–6.2)
RBC #/AREA URNS HPF: 0 /HPF (ref 0–4)
SARS-COV-2 RDRP RESP QL NAA+PROBE: POSITIVE
SODIUM SERPL-SCNC: 140 MMOL/L (ref 136–145)
SODIUM SERPL-SCNC: 141 MMOL/L (ref 136–145)
SP GR UR STRIP: 1.01 (ref 1–1.03)
SPECIMEN SOURCE: NORMAL
TROPONIN I SERPL DL<=0.01 NG/ML-MCNC: <0.006 NG/ML (ref 0–0.03)
TROPONIN I SERPL DL<=0.01 NG/ML-MCNC: <0.006 NG/ML (ref 0–0.03)
URATE SERPL-MCNC: 3.3 MG/DL (ref 3.4–7)
URN SPEC COLLECT METH UR: ABNORMAL
UROBILINOGEN UR STRIP-ACNC: NEGATIVE EU/DL
WBC # BLD AUTO: 14.14 K/UL (ref 3.9–12.7)
WBC #/AREA URNS HPF: 0 /HPF (ref 0–5)

## 2024-06-24 PROCEDURE — G0378 HOSPITAL OBSERVATION PER HR: HCPCS

## 2024-06-24 PROCEDURE — 87502 INFLUENZA DNA AMP PROBE: CPT | Performed by: EMERGENCY MEDICINE

## 2024-06-24 PROCEDURE — 63600175 PHARM REV CODE 636 W HCPCS: Performed by: PHYSICIAN ASSISTANT

## 2024-06-24 PROCEDURE — XW033E5 INTRODUCTION OF REMDESIVIR ANTI-INFECTIVE INTO PERIPHERAL VEIN, PERCUTANEOUS APPROACH, NEW TECHNOLOGY GROUP 5: ICD-10-PCS | Performed by: INTERNAL MEDICINE

## 2024-06-24 PROCEDURE — 85379 FIBRIN DEGRADATION QUANT: CPT | Performed by: INTERNAL MEDICINE

## 2024-06-24 PROCEDURE — 51798 US URINE CAPACITY MEASURE: CPT

## 2024-06-24 PROCEDURE — 83605 ASSAY OF LACTIC ACID: CPT | Mod: 91 | Performed by: INTERNAL MEDICINE

## 2024-06-24 PROCEDURE — 80048 BASIC METABOLIC PNL TOTAL CA: CPT | Mod: XB | Performed by: INTERNAL MEDICINE

## 2024-06-24 PROCEDURE — 87651 STREP A DNA AMP PROBE: CPT | Performed by: EMERGENCY MEDICINE

## 2024-06-24 PROCEDURE — 96365 THER/PROPH/DIAG IV INF INIT: CPT

## 2024-06-24 PROCEDURE — 63600175 PHARM REV CODE 636 W HCPCS: Performed by: EMERGENCY MEDICINE

## 2024-06-24 PROCEDURE — 25000003 PHARM REV CODE 250: Performed by: INTERNAL MEDICINE

## 2024-06-24 PROCEDURE — 85025 COMPLETE CBC W/AUTO DIFF WBC: CPT | Performed by: EMERGENCY MEDICINE

## 2024-06-24 PROCEDURE — 51701 INSERT BLADDER CATHETER: CPT

## 2024-06-24 PROCEDURE — 96367 TX/PROPH/DG ADDL SEQ IV INF: CPT

## 2024-06-24 PROCEDURE — 25000003 PHARM REV CODE 250: Performed by: PHYSICIAN ASSISTANT

## 2024-06-24 PROCEDURE — 94761 N-INVAS EAR/PLS OXIMETRY MLT: CPT

## 2024-06-24 PROCEDURE — 87040 BLOOD CULTURE FOR BACTERIA: CPT | Performed by: EMERGENCY MEDICINE

## 2024-06-24 PROCEDURE — 80053 COMPREHEN METABOLIC PANEL: CPT | Performed by: EMERGENCY MEDICINE

## 2024-06-24 PROCEDURE — 84145 PROCALCITONIN (PCT): CPT | Performed by: INTERNAL MEDICINE

## 2024-06-24 PROCEDURE — 27100098 HC SPACER

## 2024-06-24 PROCEDURE — 81000 URINALYSIS NONAUTO W/SCOPE: CPT | Performed by: EMERGENCY MEDICINE

## 2024-06-24 PROCEDURE — 94640 AIRWAY INHALATION TREATMENT: CPT | Mod: XB

## 2024-06-24 PROCEDURE — 83605 ASSAY OF LACTIC ACID: CPT | Performed by: EMERGENCY MEDICINE

## 2024-06-24 PROCEDURE — 93010 ELECTROCARDIOGRAM REPORT: CPT | Mod: ,,, | Performed by: INTERNAL MEDICINE

## 2024-06-24 PROCEDURE — 84550 ASSAY OF BLOOD/URIC ACID: CPT | Performed by: EMERGENCY MEDICINE

## 2024-06-24 PROCEDURE — 99285 EMERGENCY DEPT VISIT HI MDM: CPT | Mod: 25

## 2024-06-24 PROCEDURE — 99900035 HC TECH TIME PER 15 MIN (STAT)

## 2024-06-24 PROCEDURE — 84484 ASSAY OF TROPONIN QUANT: CPT | Performed by: EMERGENCY MEDICINE

## 2024-06-24 PROCEDURE — 36415 COLL VENOUS BLD VENIPUNCTURE: CPT | Performed by: INTERNAL MEDICINE

## 2024-06-24 PROCEDURE — 82550 ASSAY OF CK (CPK): CPT | Performed by: EMERGENCY MEDICINE

## 2024-06-24 PROCEDURE — 63600175 PHARM REV CODE 636 W HCPCS: Mod: JZ,TB | Performed by: INTERNAL MEDICINE

## 2024-06-24 PROCEDURE — 99223 1ST HOSP IP/OBS HIGH 75: CPT | Mod: ,,, | Performed by: PHYSICIAN ASSISTANT

## 2024-06-24 PROCEDURE — 63600175 PHARM REV CODE 636 W HCPCS: Performed by: INTERNAL MEDICINE

## 2024-06-24 PROCEDURE — 8E0ZXY6 ISOLATION: ICD-10-PCS | Performed by: INTERNAL MEDICINE

## 2024-06-24 PROCEDURE — 96375 TX/PRO/DX INJ NEW DRUG ADDON: CPT

## 2024-06-24 PROCEDURE — 25000242 PHARM REV CODE 250 ALT 637 W/ HCPCS: Performed by: PHYSICIAN ASSISTANT

## 2024-06-24 PROCEDURE — 96372 THER/PROPH/DIAG INJ SC/IM: CPT | Performed by: INTERNAL MEDICINE

## 2024-06-24 PROCEDURE — 93005 ELECTROCARDIOGRAM TRACING: CPT

## 2024-06-24 PROCEDURE — 25000003 PHARM REV CODE 250: Performed by: EMERGENCY MEDICINE

## 2024-06-24 PROCEDURE — 83880 ASSAY OF NATRIURETIC PEPTIDE: CPT | Performed by: EMERGENCY MEDICINE

## 2024-06-24 PROCEDURE — 84484 ASSAY OF TROPONIN QUANT: CPT | Mod: 91 | Performed by: INTERNAL MEDICINE

## 2024-06-24 PROCEDURE — U0002 COVID-19 LAB TEST NON-CDC: HCPCS | Performed by: EMERGENCY MEDICINE

## 2024-06-24 RX ORDER — CLOPIDOGREL BISULFATE 75 MG/1
75 TABLET ORAL DAILY
Status: DISCONTINUED | OUTPATIENT
Start: 2024-06-24 | End: 2024-07-01 | Stop reason: HOSPADM

## 2024-06-24 RX ORDER — PANTOPRAZOLE SODIUM 40 MG/1
40 TABLET, DELAYED RELEASE ORAL DAILY
Status: DISCONTINUED | OUTPATIENT
Start: 2024-06-24 | End: 2024-06-25

## 2024-06-24 RX ORDER — CIPROFLOXACIN 2 MG/ML
400 INJECTION, SOLUTION INTRAVENOUS
Status: COMPLETED | OUTPATIENT
Start: 2024-06-24 | End: 2024-06-24

## 2024-06-24 RX ORDER — DEXAMETHASONE SODIUM PHOSPHATE 4 MG/ML
8 INJECTION, SOLUTION INTRA-ARTICULAR; INTRALESIONAL; INTRAMUSCULAR; INTRAVENOUS; SOFT TISSUE
Status: COMPLETED | OUTPATIENT
Start: 2024-06-24 | End: 2024-06-24

## 2024-06-24 RX ORDER — OLANZAPINE 2.5 MG/1
5 TABLET ORAL NIGHTLY PRN
Status: DISCONTINUED | OUTPATIENT
Start: 2024-06-24 | End: 2024-07-01 | Stop reason: HOSPADM

## 2024-06-24 RX ORDER — ENOXAPARIN SODIUM 100 MG/ML
40 INJECTION SUBCUTANEOUS EVERY 12 HOURS
Status: DISCONTINUED | OUTPATIENT
Start: 2024-06-24 | End: 2024-06-24

## 2024-06-24 RX ORDER — CHOLECALCIFEROL (VITAMIN D3) 25 MCG
1000 TABLET ORAL DAILY
Status: DISCONTINUED | OUTPATIENT
Start: 2024-06-24 | End: 2024-07-01 | Stop reason: HOSPADM

## 2024-06-24 RX ORDER — ROSUVASTATIN CALCIUM 10 MG/1
10 TABLET, COATED ORAL DAILY
COMMUNITY
Start: 2024-05-27 | End: 2024-07-03 | Stop reason: SDUPTHER

## 2024-06-24 RX ORDER — ENOXAPARIN SODIUM 100 MG/ML
30 INJECTION SUBCUTANEOUS EVERY 12 HOURS
Status: DISCONTINUED | OUTPATIENT
Start: 2024-06-24 | End: 2024-06-24

## 2024-06-24 RX ORDER — SODIUM CHLORIDE 9 MG/ML
INJECTION, SOLUTION INTRAVENOUS CONTINUOUS
Status: DISCONTINUED | OUTPATIENT
Start: 2024-06-24 | End: 2024-06-25

## 2024-06-24 RX ORDER — ACETAMINOPHEN 325 MG/1
650 TABLET ORAL EVERY 8 HOURS PRN
Status: DISCONTINUED | OUTPATIENT
Start: 2024-06-24 | End: 2024-07-01 | Stop reason: HOSPADM

## 2024-06-24 RX ORDER — OLANZAPINE 5 MG/1
5 TABLET ORAL DAILY PRN
Status: DISCONTINUED | OUTPATIENT
Start: 2024-06-24 | End: 2024-06-24

## 2024-06-24 RX ORDER — HALOPERIDOL 5 MG/ML
5 INJECTION INTRAMUSCULAR
Status: COMPLETED | OUTPATIENT
Start: 2024-06-24 | End: 2024-06-24

## 2024-06-24 RX ORDER — ALBUTEROL SULFATE 90 UG/1
2 AEROSOL, METERED RESPIRATORY (INHALATION)
Status: DISCONTINUED | OUTPATIENT
Start: 2024-06-24 | End: 2024-07-01 | Stop reason: HOSPADM

## 2024-06-24 RX ORDER — ENOXAPARIN SODIUM 100 MG/ML
40 INJECTION SUBCUTANEOUS EVERY 24 HOURS
Status: DISCONTINUED | OUTPATIENT
Start: 2024-06-24 | End: 2024-06-24

## 2024-06-24 RX ORDER — ENOXAPARIN SODIUM 100 MG/ML
40 INJECTION SUBCUTANEOUS EVERY 24 HOURS
Status: DISCONTINUED | OUTPATIENT
Start: 2024-06-25 | End: 2024-06-27

## 2024-06-24 RX ORDER — TALC
6 POWDER (GRAM) TOPICAL NIGHTLY PRN
Status: DISCONTINUED | OUTPATIENT
Start: 2024-06-24 | End: 2024-07-01 | Stop reason: HOSPADM

## 2024-06-24 RX ORDER — DEXAMETHASONE SODIUM PHOSPHATE 4 MG/ML
6 INJECTION, SOLUTION INTRA-ARTICULAR; INTRALESIONAL; INTRAMUSCULAR; INTRAVENOUS; SOFT TISSUE DAILY
Status: COMPLETED | OUTPATIENT
Start: 2024-06-25 | End: 2024-06-27

## 2024-06-24 RX ORDER — ALLOPURINOL 100 MG/1
300 TABLET ORAL DAILY
Status: DISCONTINUED | OUTPATIENT
Start: 2024-06-24 | End: 2024-07-01 | Stop reason: HOSPADM

## 2024-06-24 RX ORDER — ATORVASTATIN CALCIUM 40 MG/1
40 TABLET, FILM COATED ORAL NIGHTLY
Status: DISCONTINUED | OUTPATIENT
Start: 2024-06-24 | End: 2024-07-01 | Stop reason: HOSPADM

## 2024-06-24 RX ORDER — SODIUM CHLORIDE 0.9 % (FLUSH) 0.9 %
10 SYRINGE (ML) INJECTION
Status: DISCONTINUED | OUTPATIENT
Start: 2024-06-24 | End: 2024-07-01 | Stop reason: HOSPADM

## 2024-06-24 RX ORDER — UBIDECARENONE 75 MG
500 CAPSULE ORAL DAILY
COMMUNITY

## 2024-06-24 RX ORDER — DEXAMETHASONE SODIUM PHOSPHATE 4 MG/ML
8 INJECTION, SOLUTION INTRA-ARTICULAR; INTRALESIONAL; INTRAMUSCULAR; INTRAVENOUS; SOFT TISSUE
Status: DISCONTINUED | OUTPATIENT
Start: 2024-06-25 | End: 2024-06-24

## 2024-06-24 RX ORDER — HALOPERIDOL 5 MG/ML
5 INJECTION INTRAMUSCULAR NIGHTLY
Status: DISCONTINUED | OUTPATIENT
Start: 2024-06-24 | End: 2024-06-25

## 2024-06-24 RX ADMIN — HALOPERIDOL LACTATE 5 MG: 5 INJECTION, SOLUTION INTRAMUSCULAR at 09:06

## 2024-06-24 RX ADMIN — CIPROFLOXACIN 400 MG: 2 INJECTION, SOLUTION INTRAVENOUS at 11:06

## 2024-06-24 RX ADMIN — SODIUM CHLORIDE: 9 INJECTION, SOLUTION INTRAVENOUS at 08:06

## 2024-06-24 RX ADMIN — PIPERACILLIN SODIUM AND TAZOBACTAM SODIUM 4.5 G: 4; .5 INJECTION, POWDER, LYOPHILIZED, FOR SOLUTION INTRAVENOUS at 06:06

## 2024-06-24 RX ADMIN — SODIUM CHLORIDE 2000 ML: 9 INJECTION, SOLUTION INTRAVENOUS at 10:06

## 2024-06-24 RX ADMIN — ALBUTEROL SULFATE 2 PUFF: 90 AEROSOL, METERED RESPIRATORY (INHALATION) at 07:06

## 2024-06-24 RX ADMIN — REMDESIVIR 200 MG: 100 INJECTION, POWDER, LYOPHILIZED, FOR SOLUTION INTRAVENOUS at 02:06

## 2024-06-24 RX ADMIN — DEXAMETHASONE SODIUM PHOSPHATE 8 MG: 4 INJECTION, SOLUTION INTRAMUSCULAR; INTRAVENOUS at 12:06

## 2024-06-24 RX ADMIN — HALOPERIDOL LACTATE 5 MG: 5 INJECTION, SOLUTION INTRAMUSCULAR at 10:06

## 2024-06-24 RX ADMIN — PIPERACILLIN AND TAZOBACTAM 4.5 G: 4; .5 INJECTION, POWDER, LYOPHILIZED, FOR SOLUTION INTRAVENOUS; PARENTERAL at 11:06

## 2024-06-24 RX ADMIN — ALBUTEROL SULFATE 2 PUFF: 90 AEROSOL, METERED RESPIRATORY (INHALATION) at 04:06

## 2024-06-24 RX ADMIN — SODIUM CHLORIDE 1000 ML: 9 INJECTION, SOLUTION INTRAVENOUS at 06:06

## 2024-06-24 RX ADMIN — ATORVASTATIN CALCIUM 40 MG: 20 TABLET, FILM COATED ORAL at 09:06

## 2024-06-24 NOTE — NURSING
"Patient awake and alert. Unable to assess orientation. Patient only shouts "NO" to all questions. Patient does follow some commands. Father at bedside able to answer some admission questions. Room air. VSS. No skin breakdown noted. Sinus tach on tele. Nonverbal indicators of pain absent.   "

## 2024-06-24 NOTE — HPI
Patient is a 54 year old male with medical history of Down's Syndrome, HLD, TIA and chronic cough who presented to the ED with cough and congestion for 2 weeks.  Patient currently not answering and history obtained from chart review.  Attempted to call father but is currently at eye doctor appointment.        Admitted for sepsis secondary to covid & bacterial pneumonia

## 2024-06-24 NOTE — ASSESSMENT & PLAN NOTE
Bilateral lobe hazziness L>R on CXR  Coverage for bacterial pneumonia     Antibiotics (From admission, onward)      Start     Stop Route Frequency Ordered    06/24/24 1900  piperacillin-tazobactam (ZOSYN) 4.5 g in D5W 100 mL IVPB (MB+)         -- IV Every 8 hours (non-standard times) 06/24/24 1355            Microbiology Results (last 7 days)       Procedure Component Value Units Date/Time    Blood culture #2 **CANNOT BE ORDERED STAT** [[id number]] Collected: 06/24/24 1036    Order Status: Sent Specimen: Blood from Peripheral, Forearm, Left Updated: 06/24/24 1354    Blood culture #1 **CANNOT BE ORDERED STAT** [[id number]] Collected: 06/24/24 1042    Order Status: Sent Specimen: Blood from Peripheral, Hand, Left Updated: 06/24/24 1354    Blood culture [[id number]]     Order Status: Canceled Specimen: Blood     Blood culture [[id number]]     Order Status: Canceled Specimen: Blood     Influenza A & B by Molecular [[id number]] Collected: 06/24/24 1026    Order Status: Completed Specimen: Nasopharyngeal Swab Updated: 06/24/24 1102     Influenza A, Molecular Negative     Influenza B, Molecular Negative     Flu A & B Source Nasal swab    Group A Strep, Molecular [[id number]] Collected: 06/24/24 1026    Order Status: Completed Specimen: Throat Updated: 06/24/24 1053     Group A Strep, Molecular Negative     Comment: Arcanobacterium haemolyticum and Beta Streptococcus group C   and G will not be detected by this test method.  Please order   Throat Culture ([id number]) if suspected.

## 2024-06-24 NOTE — PROGRESS NOTES
Pharmacist Renal Dose Adjustment Note    Janes Strange is a 54 y.o. male being treated with the medication enoxaparin.    Patient Data:    Vital Signs (Most Recent):  Temp: 98.7 °F (37.1 °C) (06/24/24 1021)  Pulse: 107 (06/24/24 1620)  Resp: 19 (06/24/24 1620)  BP: (!) 157/93 (06/24/24 1620)  SpO2: (!) 93 % (06/24/24 1620) Vital Signs (72h Range):  Temp:  [98.7 °F (37.1 °C)]   Pulse:  []   Resp:  [19-24]   BP: ()/(52-93)   SpO2:  [93 %-95 %]      Recent Labs   Lab 06/24/24  1042   CREATININE 1.2     Serum creatinine: 1.2 mg/dL 06/24/24 1042  Estimated creatinine clearance: 55.1 mL/min    Medication: enoxaparin dose: 40 mg frequency  every 12 hours will be changed to medication: enoxaparin dose: 40 mg frequency: daily, for CrCl > 30 mL/min and BMI < 40    Pharmacist's Name: Lupe Mulligan

## 2024-06-24 NOTE — H&P
[facility] Medicine  History & Physical    Patient Name: Janes Strange  MRN: [id number]  Patient Class: OP- Observation  Admission Date: 6/24/2024  Attending Physician: Tristin Mckee MD   Primary Care Provider: Luna Kelly NP         Patient information was obtained from ER records.     Subjective:     Principal Problem:Sepsis    Chief Complaint:   Chief Complaint   Patient presents with    Cough    Nasal Congestion        HPI: Patient is a 54 year old male with medical history of Down's Syndrome, HLD, TIA and chronic cough who presented to the ED with cough and congestion for 2 weeks.  Patient currently not answering and history obtained from chart review.  Attempted to call father but is currently at eye doctor appointment.        Admitted for sepsis secondary to covid & bacterial pneumonia           Past Medical History:   Diagnosis Date    Down's syndrome     Seizure        History reviewed. No pertinent surgical history.    Review of patient's allergies indicates:   Allergen Reactions    Mayonnaise Blisters    Shellfish containing products      seafood       No current facility-administered medications on file prior to encounter.     Current Outpatient Medications on File Prior to Encounter   Medication Sig    allopurinoL (ZYLOPRIM) 300 MG tablet Take 1 tablet (300 mg total) by mouth once daily.    clopidogreL (PLAVIX) 75 mg tablet Take 1 tablet by mouth once daily    loratadine (CLARITIN) 10 mg tablet Take 1 tablet (10 mg total) by mouth once daily.    MULTIVITAMIN ORAL Take 1 tablet by mouth once daily. Centrum vitamin    mupirocin (BACTROBAN) 2 % ointment Apply topically 3 (three) times daily.    omeprazole (PRILOSEC) 20 MG capsule Take 1 capsule (20 mg total) by mouth once daily.    rosuvastatin (CRESTOR) 20 MG tablet Take 1 tablet (20 mg total) by mouth once daily.    vitamin D 1000 units Tab Take 185 mg by mouth once daily.     Family History    None       Tobacco Use     "Smoking status: Never    Smokeless tobacco: Never   Substance and Sexual Activity    Alcohol use: No    Drug use: No    Sexual activity: Never     Review of Systems   Reason unable to perform ROS: patient not answering questions.     Objective:     Vital Signs (Most Recent):  Temp: 98.7 °F (37.1 °C) (06/24/24 1021)  Pulse: 90 (06/24/24 1318)  Resp: (!) 24 (06/24/24 1021)  BP: 96/76 (06/24/24 1318)  SpO2: (!) 94 % (06/24/24 1318) Vital Signs (24h Range):  Temp:  [98.7 °F (37.1 °C)] 98.7 °F (37.1 °C)  Pulse:  [] 90  Resp:  [24] 24  SpO2:  [93 %-94 %] 94 %  BP: ()/(60-92) 96/76     Weight: 63.5 kg (140 lb)  Body mass index is 29.26 kg/m².     Physical Exam  Constitutional:       General: He is not in acute distress.     Comments: Bent over in bed. Nursing staff stating this is his normal comfortable position    HENT:      Head: Normocephalic and atraumatic.   Eyes:      General:         Right eye: No discharge.         Left eye: No discharge.   Cardiovascular:      Rate and Rhythm: Normal rate and regular rhythm.   Pulmonary:      Effort: Pulmonary effort is normal. No respiratory distress.      Breath sounds: Decreased breath sounds.   Abdominal:      General: There is no distension.      Tenderness: There is no abdominal tenderness.   Musculoskeletal:         General: No swelling or tenderness.      Cervical back: Neck supple. No tenderness.   Skin:     General: Skin is warm and dry.   Neurological:      General: No focal deficit present.      Mental Status: He is alert and oriented to person, place, and time.   Psychiatric:         Mood and Affect: Mood normal.         Behavior: Behavior normal.                Significant Labs: A1C: No results for input(s): "HGBA1C" in the last 4320 hours.  ABGs: No results for input(s): "PH", "PCO2", "HCO3", "POCSATURATED", "BE", "TOTALHB", "COHB", "METHB", "O2HB", "POCFIO2", "PO2" in the last 48 hours.  Bilirubin:   Recent Labs   Lab 06/24/24  1042   BILITOT 0.4 " "    Blood Culture: No results for input(s): "LABBLOO" in the last 48 hours.  BMP:   Recent Labs   Lab 06/24/24  1042   *      K 4.4      CO2 24   BUN 21*   CREATININE 1.2   CALCIUM 9.7     CBC:   Recent Labs   Lab 06/24/24  1042   WBC 14.14*   HGB 15.7   HCT 47.7        CMP:   Recent Labs   Lab 06/24/24  1042      K 4.4      CO2 24   *   BUN 21*   CREATININE 1.2   CALCIUM 9.7   PROT 7.7   ALBUMIN 3.2*   BILITOT 0.4   ALKPHOS 72   AST 31   ALT 29   ANIONGAP 15     Cardiac Markers:   Recent Labs   Lab 06/24/24  1042   BNP 10     Coagulation: No results for input(s): "PT", "INR", "APTT" in the last 48 hours.  Lactic Acid:   Recent Labs   Lab 06/24/24  1042 06/24/24  1242   LACTATE 4.1* 2.8*     Lipase: No results for input(s): "LIPASE" in the last 48 hours.  Lipid Panel: No results for input(s): "CHOL", "HDL", "LDLCALC", "TRIG", "CHOLHDL" in the last 48 hours.  Magnesium: No results for input(s): "MG" in the last 48 hours.  POCT Glucose: No results for input(s): "POCTGLUCOSE" in the last 48 hours.  Prealbumin: No results for input(s): "PREALBUMIN" in the last 48 hours.  Respiratory Culture: No results for input(s): "GSRESP", "RESPIRATORYC" in the last 48 hours.  Troponin:   Recent Labs   Lab 06/24/24  1042   TROPONINI <0.006     TSH:   Recent Labs   Lab 02/20/24  0831   TSH 4.622*     Urine Culture: No results for input(s): "LABURIN" in the last 48 hours.  Urine Studies: No results for input(s): "COLORU", "APPEARANCEUA", "PHUR", "SPECGRAV", "PROTEINUA", "GLUCUA", "KETONESU", "BILIRUBINUA", "OCCULTUA", "NITRITE", "UROBILINOGEN", "LEUKOCYTESUR", "RBCUA", "WBCUA", "BACTERIA", "SQUAMEPITHEL", "HYALINECASTS" in the last 48 hours.    Invalid input(s): "WRIGHTSUR"    Significant Imaging: I have reviewed all pertinent imaging results/findings within the past 24 hours.  Assessment/Plan:     * Sepsis  This patient does have evidence of infective focus  My overall impression is " sepsis.  Source: Respiratory  Antibiotics given-   Antibiotics (72h ago, onward)      Start     Stop Route Frequency Ordered    06/24/24 1900  piperacillin-tazobactam (ZOSYN) 4.5 g in D5W 100 mL IVPB (MB+)         -- IV Every 8 hours (non-standard times) 06/24/24 1355          Latest lactate reviewed-  Recent Labs   Lab 06/24/24  1242   LACTATE 2.8*     Organ dysfunction indicated by pulmonary edema on CXR     Fluid challenge 2 L bolus     Post- resuscitation assessment No - Post resuscitation assessment not needed     CXR pulmonary edema, L lobe consolidation   Will Not start Pressors- Levophed for MAP of 65  Source control achieved by: IV zosyn, IV remdesivir  Blood cultures pending/ U/A pending   BNP, procal & troponin wnl     Pneumonia  Bilateral lobe hazziness L>R on CXR  Coverage for bacterial pneumonia     Antibiotics (From admission, onward)      Start     Stop Route Frequency Ordered    06/24/24 1900  piperacillin-tazobactam (ZOSYN) 4.5 g in D5W 100 mL IVPB (MB+)         -- IV Every 8 hours (non-standard times) 06/24/24 1355            Microbiology Results (last 7 days)       Procedure Component Value Units Date/Time    Blood culture #2 **CANNOT BE ORDERED STAT** [[id number]] Collected: 06/24/24 1036    Order Status: Sent Specimen: Blood from Peripheral, Forearm, Left Updated: 06/24/24 1354    Blood culture #1 **CANNOT BE ORDERED STAT** [[id number]] Collected: 06/24/24 1042    Order Status: Sent Specimen: Blood from Peripheral, Hand, Left Updated: 06/24/24 1354    Blood culture [[id number]]     Order Status: Canceled Specimen: Blood     Blood culture [[id number]]     Order Status: Canceled Specimen: Blood     Influenza A & B by Molecular [[id number]] Collected: 06/24/24 1026    Order Status: Completed Specimen: Nasopharyngeal Swab Updated: 06/24/24 1102     Influenza A, Molecular Negative     Influenza B, Molecular Negative     Flu A & B Source Nasal swab    Group A Strep, Molecular [[id number]] Collected:  06/24/24 1026    Order Status: Completed Specimen: Throat Updated: 06/24/24 1053     Group A Strep, Molecular Negative     Comment: Arcanobacterium haemolyticum and Beta Streptococcus group C   and G will not be detected by this test method.  Please order   Throat Culture ([id number]) if suspected.                 Elevated lactic acid level  Lactic acid 4.1 at presentation and now 2.8       COVID-19  Patient is identified as Severe COVID-19 based on hypoxemia with O2 saturations <94% on room air or on ambulation   Initiate standard COVID protocols; COVID-19 testing ,Infection Control notification  and isolation- respiratory, contact and droplet per protocol    Diagnostics: CBC, CMP, Ferritin, CRP, and Portable CXR    Management: Maintain oxygen saturations 92-96% via currently on room air and monitor with continuous/intermittent pulse oximetry. , Inhaled bronchodilators as needed for shortness of breath., and Continuous cardiac monitoring.    Advance Care Planning Current advance care plan has not been discussed with patient. Attempted to contact father Sharon    GERD (gastroesophageal reflux disease)  Continue protonix       HLD (hyperlipidemia)  Continue statin       History of TIA (transient ischemic attack)  Continue atorvastatin and plavix      Gout  Continue allopurinol      Down syndrome  Received haloperidol in the ED   Currently calm, awake but nonconversant        VTE Risk Mitigation (From admission, onward)           Ordered     enoxaparin injection 40 mg  Every 24 hours         06/24/24 1325                         On 06/24/2024, patient should be placed in hospital observation services under my care in collaboration with Dr. Mckee.           Desire Skaggs PA-C  Department of Hospital Medicine  [facility] - Emergency Dept

## 2024-06-24 NOTE — ED PROVIDER NOTES
Encounter Date: 6/24/2024       History     Chief Complaint   Patient presents with    Cough    Nasal Congestion     HPI    Patient is a 54-year-old white male with past medical history down syndrome and seizure disorder presenting with cough, congestion subjective fever and chills worsening for the past 2 weeks.  Patient has been having coughing fits and more agitated than usual.  He has not been eating and drinking as much as usual    Review of patient's allergies indicates:   Allergen Reactions    Mayonnaise Blisters    Shellfish containing products      seafood     Past Medical History:   Diagnosis Date    Down's syndrome     Seizure      History reviewed. No pertinent surgical history.  No family history on file.  Social History     Tobacco Use    Smoking status: Never    Smokeless tobacco: Never   Substance Use Topics    Alcohol use: No    Drug use: No     Review of Systems   Constitutional:  Negative for chills and fever.   Respiratory:  Positive for cough.    Cardiovascular:  Negative for chest pain.   Gastrointestinal:  Negative for abdominal pain.   Musculoskeletal:  Negative for back pain.   Skin:  Negative for wound.   Neurological:  Negative for dizziness and weakness.   All other systems reviewed and are negative.    Social Determinants of Health     Tobacco Use: Low Risk  (6/24/2024)    Patient History     Smoking Tobacco Use: Never     Smokeless Tobacco Use: Never     Passive Exposure: Not on file   Alcohol Use: Not on file   Financial Resource Strain: Not on file   Food Insecurity: Not on file   Transportation Needs: Not on file   Physical Activity: Not on file   Stress: Not on file   Housing Stability: Not on file   Depression: Low Risk  (1/28/2023)    Depression     Last PHQ-4: Flowsheet Data: Not on file   Utilities: Not on file   Health Literacy: Not on file   Social Isolation: Not on file       Physical Exam     Initial Vitals [06/24/24 1021]   BP Pulse Resp Temp SpO2   (!) 162/60 109 (!) 24  98.7 °F (37.1 °C) (!) 93 %      MAP       --         Physical Exam    Nursing note and vitals reviewed.  Constitutional: He appears well-developed and well-nourished.   HENT:   Head: Normocephalic and atraumatic.   Mucous membranes dry   Eyes: EOM are normal. Pupils are equal, round, and reactive to light.   Neck: No JVD present.   Normal range of motion.  Cardiovascular:  Normal rate and normal heart sounds.           Pulmonary/Chest: Breath sounds normal. No stridor.   Abdominal: Abdomen is soft.   Musculoskeletal:         General: Normal range of motion.      Cervical back: Normal range of motion.     Neurological: He is alert and oriented to person, place, and time. GCS score is 15. GCS eye subscore is 4. GCS verbal subscore is 5. GCS motor subscore is 6.   Skin: Skin is warm. Capillary refill takes less than 2 seconds.         ED Course   Critical Care    Date/Time: 6/24/2024 1:36 PM    Performed by: Ksenia Syed MD  Authorized by: Ksenia Syed MD  Direct patient critical care time: 55 minutes  Total critical care time (exclusive of procedural time) : 55 minutes  Critical care was time spent personally by me on the following activities: examination of patient, evaluation of patient's response to treatment, ordering and review of laboratory studies and ordering and review of radiographic studies.        Labs Reviewed   CBC W/ AUTO DIFFERENTIAL - Abnormal; Notable for the following components:       Result Value    WBC 14.14 (*)     RDW 14.7 (*)     Immature Granulocytes 1.8 (*)     Gran # (ANC) 9.5 (*)     Immature Grans (Abs) 0.25 (*)     All other components within normal limits   COMPREHENSIVE METABOLIC PANEL - Abnormal; Notable for the following components:    Glucose 154 (*)     BUN 21 (*)     Albumin 3.2 (*)     All other components within normal limits   LACTIC ACID, PLASMA - Abnormal; Notable for the following components:    Lactate (Lactic Acid) 4.1 (*)     All other components within normal  limits    Narrative:      LA  critical result(s) called and verbal readback obtained from   Helen RAMÍREZ RN  by AT6 06/24/2024 11:15   SARS-COV-2 RNA AMPLIFICATION, QUAL - Abnormal; Notable for the following components:    SARS-CoV-2 RNA, Amplification, Qual Positive (*)     All other components within normal limits   LACTIC ACID, PLASMA - Abnormal; Notable for the following components:    Lactate (Lactic Acid) 2.8 (*)     All other components within normal limits   D DIMER, QUANTITATIVE - Abnormal; Notable for the following components:    D-Dimer 0.72 (*)     All other components within normal limits   LACTIC ACID, PLASMA - Abnormal; Notable for the following components:    Lactate (Lactic Acid) 3.2 (*)     All other components within normal limits   URIC ACID - Abnormal; Notable for the following components:    Uric Acid 3.3 (*)     All other components within normal limits   INFLUENZA A & B BY MOLECULAR   GROUP A STREP, MOLECULAR   CULTURE, BLOOD   CULTURE, BLOOD   CK   TROPONIN I   B-TYPE NATRIURETIC PEPTIDE   PROCALCITONIN   D DIMER, QUANTITATIVE   TROPONIN I   TROPONIN I   B-TYPE NATRIURETIC PEPTIDE   URIC ACID   URINALYSIS, REFLEX TO URINE CULTURE   PROCALCITONIN     EKG Readings: (Independently Interpreted)   Initial Reading: No STEMI. Rhythm: Sinus Tachycardia. Heart Rate: 107. Ectopy: No Ectopy. Conduction: Normal. ST Segments: Normal ST Segments. T Waves: Normal. Axis: Normal.     ECG Results              EKG 12-lead (Final result)        Collection Time Result Time QRS Duration OHS QTC Calculation    06/24/24 10:24:06 06/24/24 16:10:38 54 432                     Final result by Interface, Lab In [facility] (06/24/24 16:10:42)                   Narrative:    Test Reason : R06.02    Vent. Rate : 107 BPM     Atrial Rate : 107 BPM     P-R Int : 128 ms          QRS Dur : 054 ms      QT Int : 324 ms       P-R-T Axes : 066 057 068 degrees     QTc Int : 432 ms    Sinus tachycardia  Otherwise normal ECG  No previous  ECGs available  Confirmed by Bobo Fatima MD ([id number]) on 6/24/2024 4:10:34 PM    Referred By: [email or web address]   SELF           Confirmed By:Bobo Fatima MD                                  Imaging Results              X-Ray Chest AP Portable (Final result)  Result time 06/24/24 11:46:14      Final result by Chel Stephen MD (06/24/24 11:46:14)                   Impression:      Above.      Electronically signed by: Chel Stephen MD  Date:    06/24/2024  Time:    11:46               Narrative:    EXAMINATION:  XR CHEST AP PORTABLE    CLINICAL HISTORY:  Cough, unspecified    TECHNIQUE:  Single frontal view of the chest was performed.    COMPARISON:  11/22/2019    FINDINGS:  Heart is enlarged.  There is pulmonary vascular congestion with pulmonary edema and bilateral pleural effusions.  Component of superimposed pneumonia in the left lung not excluded.  Skeletal structures are intact.                                       Medications   remdesivir 200 mg in 0.9% NaCl 250 mL infusion (0 mg Intravenous Stopped 6/24/24 1507)     Followed by   remdesivir 100 mg in 0.9% NaCl 250 mL infusion (has no administration in time range)   allopurinoL tablet 300 mg (300 mg Oral Not Given 6/24/24 1430)   clopidogreL tablet 75 mg (75 mg Oral Not Given 6/24/24 1430)   pantoprazole EC tablet 40 mg (40 mg Oral Not Given 6/24/24 1430)   atorvastatin tablet 40 mg (has no administration in time range)   vitamin D 1000 units tablet 1,000 Units (1,000 Units Oral Not Given 6/24/24 1430)   piperacillin-tazobactam (ZOSYN) 4.5 g in D5W 100 mL IVPB (MB+) (has no administration in time range)   enoxaparin injection 30 mg (has no administration in time range)   OLANZapine tablet 5 mg (has no administration in time range)   albuterol inhaler 2 puff (2 puffs Inhalation Given 6/24/24 1620)   dexAMETHasone injection 6 mg (has no administration in time range)   sodium chloride 0.9% bolus 2,000 mL 2,000 mL (0 mLs Intravenous Stopped 6/24/24 1235)    piperacillin-tazobactam (ZOSYN) 4.5 g in D5W 100 mL IVPB (MB+) (0 g Intravenous Stopped 6/24/24 1130)   ciprofloxacin (CIPRO)400mg/200ml D5W IVPB 400 mg (0 mg Intravenous Stopped 6/24/24 1252)   haloperidol lactate injection 5 mg (5 mg Intravenous Given 6/24/24 1055)   dexAMETHasone injection 8 mg (8 mg Intravenous Given 6/24/24 1258)     Medical Decision Making  This is an emergent evaluation of a 54y WM hx Downs syndrome and seizures presenting with cough, congestion, decreased appetite and fatigue for the past 2 weeks.  Exam he is non toxic, afebrile with coarse breath sounds bilaterally and dry mucous membranes.  Labs remarkable for leukocytosis of 14, elevated lactic acid 4.1 trending down to 2.8 and + COVID-19.  CXR shows bilateral ground glass opacities.     Will admit for supplemental oxygen, remdesivir and anticoagulation.    DDX; PNA, viral illness, sepsis    Amount and/or Complexity of Data Reviewed  Labs: ordered. Decision-making details documented in ED Course.  Radiology: ordered.    Risk  OTC drugs.  Prescription drug management.               ED Course as of 06/24/24 1642   Mon Jun 24, 2024   1215 Lactic Acid Level(!!): 4.1 [AP]   1215 WBC(!): 14.14 [AP]   1215 SARS-CoV-2 RNA, Amplification, Qual(!): Positive [AP]      ED Course User Index  [AP] Ksenia Syed MD                           Clinical Impression:  Final diagnoses:  [R00.0] Tachycardia  [R05.9] Cough  [M10.9] Gout, arthritis (Primary)  [J96.01] Acute respiratory failure with hypoxia          ED Disposition Condition    Observation                 Ksenia Syed MD  06/24/24 1642

## 2024-06-24 NOTE — ASSESSMENT & PLAN NOTE
This patient does have evidence of infective focus  My overall impression is sepsis.  Source: Respiratory  Antibiotics given-   Antibiotics (72h ago, onward)      Start     Stop Route Frequency Ordered    06/24/24 1900  piperacillin-tazobactam (ZOSYN) 4.5 g in D5W 100 mL IVPB (MB+)         -- IV Every 8 hours (non-standard times) 06/24/24 1355          Latest lactate reviewed-  Recent Labs   Lab 06/24/24  1242   LACTATE 2.8*     Organ dysfunction indicated by pulmonary edema on CXR     Fluid challenge 2 L bolus     Post- resuscitation assessment No - Post resuscitation assessment not needed     CXR pulmonary edema, L lobe consolidation   Will Not start Pressors- Levophed for MAP of 65  Source control achieved by: IV zosyn, IV remdesivir  Blood cultures pending/ U/A pending   BNP, procal & troponin wnl

## 2024-06-24 NOTE — ASSESSMENT & PLAN NOTE
Patient is identified as Severe COVID-19 based on hypoxemia with O2 saturations <94% on room air or on ambulation   Initiate standard COVID protocols; COVID-19 testing ,Infection Control notification  and isolation- respiratory, contact and droplet per protocol    Diagnostics: CBC, CMP, Ferritin, CRP, and Portable CXR    Management: Maintain oxygen saturations 92-96% via currently on room air and monitor with continuous/intermittent pulse oximetry. , Inhaled bronchodilators as needed for shortness of breath., and Continuous cardiac monitoring.    Advance Care Planning  Current advance care plan has not been discussed with patient. Attempted to contact father     Lovenox

## 2024-06-24 NOTE — SUBJECTIVE & OBJECTIVE
Past Medical History:   Diagnosis Date    Down's syndrome     Seizure        History reviewed. No pertinent surgical history.    Review of patient's allergies indicates:   Allergen Reactions    Mayonnaise Blisters    Shellfish containing products      seafood       No current facility-administered medications on file prior to encounter.     Current Outpatient Medications on File Prior to Encounter   Medication Sig    allopurinoL (ZYLOPRIM) 300 MG tablet Take 1 tablet (300 mg total) by mouth once daily.    clopidogreL (PLAVIX) 75 mg tablet Take 1 tablet by mouth once daily    loratadine (CLARITIN) 10 mg tablet Take 1 tablet (10 mg total) by mouth once daily.    MULTIVITAMIN ORAL Take 1 tablet by mouth once daily. Centrum vitamin    mupirocin (BACTROBAN) 2 % ointment Apply topically 3 (three) times daily.    omeprazole (PRILOSEC) 20 MG capsule Take 1 capsule (20 mg total) by mouth once daily.    rosuvastatin (CRESTOR) 20 MG tablet Take 1 tablet (20 mg total) by mouth once daily.    vitamin D 1000 units Tab Take 185 mg by mouth once daily.     Family History    None       Tobacco Use    Smoking status: Never    Smokeless tobacco: Never   Substance and Sexual Activity    Alcohol use: No    Drug use: No    Sexual activity: Never     Review of Systems   Reason unable to perform ROS: patient not answering questions.     Objective:     Vital Signs (Most Recent):  Temp: 98.7 °F (37.1 °C) (06/24/24 1021)  Pulse: 90 (06/24/24 1318)  Resp: (!) 24 (06/24/24 1021)  BP: 96/76 (06/24/24 1318)  SpO2: (!) 94 % (06/24/24 1318) Vital Signs (24h Range):  Temp:  [98.7 °F (37.1 °C)] 98.7 °F (37.1 °C)  Pulse:  [] 90  Resp:  [24] 24  SpO2:  [93 %-94 %] 94 %  BP: ()/(60-92) 96/76     Weight: 63.5 kg (140 lb)  Body mass index is 29.26 kg/m².     Physical Exam  Constitutional:       General: He is not in acute distress.     Comments: Bent over in bed. Nursing staff stating this is his normal comfortable position    HENT:      Head:  "Normocephalic and atraumatic.   Eyes:      General:         Right eye: No discharge.         Left eye: No discharge.   Cardiovascular:      Rate and Rhythm: Normal rate and regular rhythm.   Pulmonary:      Effort: Pulmonary effort is normal. No respiratory distress.      Breath sounds: Normal breath sounds.   Abdominal:      General: There is no distension.      Tenderness: There is no abdominal tenderness.   Musculoskeletal:         General: No swelling or tenderness.      Cervical back: Neck supple. No tenderness.   Skin:     General: Skin is warm and dry.   Neurological:      General: No focal deficit present.      Mental Status: He is alert and oriented to person, place, and time.   Psychiatric:         Mood and Affect: Mood normal.         Behavior: Behavior normal.                Significant Labs: A1C: No results for input(s): "HGBA1C" in the last 4320 hours.  ABGs: No results for input(s): "PH", "PCO2", "HCO3", "POCSATURATED", "BE", "TOTALHB", "COHB", "METHB", "O2HB", "POCFIO2", "PO2" in the last 48 hours.  Bilirubin:   Recent Labs   Lab 06/24/24  1042   BILITOT 0.4     Blood Culture: No results for input(s): "LABBLOO" in the last 48 hours.  BMP:   Recent Labs   Lab 06/24/24  1042   *      K 4.4      CO2 24   BUN 21*   CREATININE 1.2   CALCIUM 9.7     CBC:   Recent Labs   Lab 06/24/24  1042   WBC 14.14*   HGB 15.7   HCT 47.7        CMP:   Recent Labs   Lab 06/24/24  1042      K 4.4      CO2 24   *   BUN 21*   CREATININE 1.2   CALCIUM 9.7   PROT 7.7   ALBUMIN 3.2*   BILITOT 0.4   ALKPHOS 72   AST 31   ALT 29   ANIONGAP 15     Cardiac Markers:   Recent Labs   Lab 06/24/24  1042   BNP 10     Coagulation: No results for input(s): "PT", "INR", "APTT" in the last 48 hours.  Lactic Acid:   Recent Labs   Lab 06/24/24  1042 06/24/24  1242   LACTATE 4.1* 2.8*     Lipase: No results for input(s): "LIPASE" in the last 48 hours.  Lipid Panel: No results for input(s): "CHOL", " ""HDL", "LDLCALC", "TRIG", "CHOLHDL" in the last 48 hours.  Magnesium: No results for input(s): "MG" in the last 48 hours.  POCT Glucose: No results for input(s): "POCTGLUCOSE" in the last 48 hours.  Prealbumin: No results for input(s): "PREALBUMIN" in the last 48 hours.  Respiratory Culture: No results for input(s): "GSRESP", "RESPIRATORYC" in the last 48 hours.  Troponin:   Recent Labs   Lab 06/24/24  1042   TROPONINI <0.006     TSH:   Recent Labs   Lab 02/20/24  0831   TSH 4.622*     Urine Culture: No results for input(s): "LABURIN" in the last 48 hours.  Urine Studies: No results for input(s): "COLORU", "APPEARANCEUA", "PHUR", "SPECGRAV", "PROTEINUA", "GLUCUA", "KETONESU", "BILIRUBINUA", "OCCULTUA", "NITRITE", "UROBILINOGEN", "LEUKOCYTESUR", "RBCUA", "WBCUA", "BACTERIA", "SQUAMEPITHEL", "HYALINECASTS" in the last 48 hours.    Invalid input(s): "WRIGHTSUR"    Significant Imaging: I have reviewed all pertinent imaging results/findings within the past 24 hours.  "

## 2024-06-25 PROBLEM — J18.9 SEPSIS DUE TO PNEUMONIA: Status: ACTIVE | Noted: 2024-06-24

## 2024-06-25 PROBLEM — R14.0 ABDOMINAL DISTENTION: Status: ACTIVE | Noted: 2024-06-25

## 2024-06-25 PROBLEM — R33.9 URINARY RETENTION: Status: ACTIVE | Noted: 2024-06-25

## 2024-06-25 LAB
ALBUMIN SERPL BCP-MCNC: 2.8 G/DL (ref 3.5–5.2)
ALBUMIN SERPL BCP-MCNC: 2.8 G/DL (ref 3.5–5.2)
ALP SERPL-CCNC: 67 U/L (ref 55–135)
ALP SERPL-CCNC: 67 U/L (ref 55–135)
ALT SERPL W/O P-5'-P-CCNC: 28 U/L (ref 10–44)
ALT SERPL W/O P-5'-P-CCNC: 28 U/L (ref 10–44)
ANION GAP SERPL CALC-SCNC: 10 MMOL/L (ref 8–16)
ANION GAP SERPL CALC-SCNC: 10 MMOL/L (ref 8–16)
AST SERPL-CCNC: 39 U/L (ref 10–40)
AST SERPL-CCNC: 39 U/L (ref 10–40)
BASOPHILS # BLD AUTO: 0.05 K/UL (ref 0–0.2)
BASOPHILS # BLD AUTO: 0.05 K/UL (ref 0–0.2)
BASOPHILS NFR BLD: 0.3 % (ref 0–1.9)
BASOPHILS NFR BLD: 0.3 % (ref 0–1.9)
BILIRUB SERPL-MCNC: 0.5 MG/DL (ref 0.1–1)
BILIRUB SERPL-MCNC: 0.5 MG/DL (ref 0.1–1)
BUN SERPL-MCNC: 14 MG/DL (ref 6–20)
BUN SERPL-MCNC: 14 MG/DL (ref 6–20)
CALCIUM SERPL-MCNC: 8.4 MG/DL (ref 8.7–10.5)
CALCIUM SERPL-MCNC: 8.4 MG/DL (ref 8.7–10.5)
CHLORIDE SERPL-SCNC: 107 MMOL/L (ref 95–110)
CHLORIDE SERPL-SCNC: 107 MMOL/L (ref 95–110)
CO2 SERPL-SCNC: 22 MMOL/L (ref 23–29)
CO2 SERPL-SCNC: 22 MMOL/L (ref 23–29)
CREAT SERPL-MCNC: 0.9 MG/DL (ref 0.5–1.4)
CREAT SERPL-MCNC: 0.9 MG/DL (ref 0.5–1.4)
DIFFERENTIAL METHOD BLD: ABNORMAL
DIFFERENTIAL METHOD BLD: ABNORMAL
EOSINOPHIL # BLD AUTO: 0 K/UL (ref 0–0.5)
EOSINOPHIL # BLD AUTO: 0 K/UL (ref 0–0.5)
EOSINOPHIL NFR BLD: 0 % (ref 0–8)
EOSINOPHIL NFR BLD: 0 % (ref 0–8)
ERYTHROCYTE [DISTWIDTH] IN BLOOD BY AUTOMATED COUNT: 14.6 % (ref 11.5–14.5)
ERYTHROCYTE [DISTWIDTH] IN BLOOD BY AUTOMATED COUNT: 14.6 % (ref 11.5–14.5)
EST. GFR  (NO RACE VARIABLE): >60 ML/MIN/1.73 M^2
EST. GFR  (NO RACE VARIABLE): >60 ML/MIN/1.73 M^2
GLUCOSE SERPL-MCNC: 110 MG/DL (ref 70–110)
GLUCOSE SERPL-MCNC: 110 MG/DL (ref 70–110)
HCT VFR BLD AUTO: 43.3 % (ref 40–54)
HCT VFR BLD AUTO: 43.3 % (ref 40–54)
HGB BLD-MCNC: 14.5 G/DL (ref 14–18)
HGB BLD-MCNC: 14.5 G/DL (ref 14–18)
IMM GRANULOCYTES # BLD AUTO: 0.19 K/UL (ref 0–0.04)
IMM GRANULOCYTES # BLD AUTO: 0.19 K/UL (ref 0–0.04)
IMM GRANULOCYTES NFR BLD AUTO: 1.3 % (ref 0–0.5)
IMM GRANULOCYTES NFR BLD AUTO: 1.3 % (ref 0–0.5)
LACTATE SERPL-SCNC: 2.5 MMOL/L (ref 0.5–2.2)
LACTATE SERPL-SCNC: 3 MMOL/L (ref 0.5–2.2)
LYMPHOCYTES # BLD AUTO: 3.9 K/UL (ref 1–4.8)
LYMPHOCYTES # BLD AUTO: 3.9 K/UL (ref 1–4.8)
LYMPHOCYTES NFR BLD: 26.1 % (ref 18–48)
LYMPHOCYTES NFR BLD: 26.1 % (ref 18–48)
MAGNESIUM SERPL-MCNC: 1.8 MG/DL (ref 1.6–2.6)
MCH RBC QN AUTO: 29.3 PG (ref 27–31)
MCH RBC QN AUTO: 29.3 PG (ref 27–31)
MCHC RBC AUTO-ENTMCNC: 33.5 G/DL (ref 32–36)
MCHC RBC AUTO-ENTMCNC: 33.5 G/DL (ref 32–36)
MCV RBC AUTO: 88 FL (ref 82–98)
MCV RBC AUTO: 88 FL (ref 82–98)
MONOCYTES # BLD AUTO: 1.4 K/UL (ref 0.3–1)
MONOCYTES # BLD AUTO: 1.4 K/UL (ref 0.3–1)
MONOCYTES NFR BLD: 9.3 % (ref 4–15)
MONOCYTES NFR BLD: 9.3 % (ref 4–15)
NEUTROPHILS # BLD AUTO: 9.4 K/UL (ref 1.8–7.7)
NEUTROPHILS # BLD AUTO: 9.4 K/UL (ref 1.8–7.7)
NEUTROPHILS NFR BLD: 63 % (ref 38–73)
NEUTROPHILS NFR BLD: 63 % (ref 38–73)
NRBC BLD-RTO: 0 /100 WBC
NRBC BLD-RTO: 0 /100 WBC
PLATELET # BLD AUTO: 396 K/UL (ref 150–450)
PLATELET # BLD AUTO: 396 K/UL (ref 150–450)
PMV BLD AUTO: 9.7 FL (ref 9.2–12.9)
PMV BLD AUTO: 9.7 FL (ref 9.2–12.9)
POTASSIUM SERPL-SCNC: 4 MMOL/L (ref 3.5–5.1)
POTASSIUM SERPL-SCNC: 4 MMOL/L (ref 3.5–5.1)
PROT SERPL-MCNC: 6.6 G/DL (ref 6–8.4)
PROT SERPL-MCNC: 6.6 G/DL (ref 6–8.4)
RBC # BLD AUTO: 4.95 M/UL (ref 4.6–6.2)
RBC # BLD AUTO: 4.95 M/UL (ref 4.6–6.2)
SODIUM SERPL-SCNC: 139 MMOL/L (ref 136–145)
SODIUM SERPL-SCNC: 139 MMOL/L (ref 136–145)
WBC # BLD AUTO: 14.97 K/UL (ref 3.9–12.7)
WBC # BLD AUTO: 14.97 K/UL (ref 3.9–12.7)

## 2024-06-25 PROCEDURE — 94640 AIRWAY INHALATION TREATMENT: CPT

## 2024-06-25 PROCEDURE — 25000003 PHARM REV CODE 250: Performed by: INTERNAL MEDICINE

## 2024-06-25 PROCEDURE — 36415 COLL VENOUS BLD VENIPUNCTURE: CPT | Performed by: PHYSICIAN ASSISTANT

## 2024-06-25 PROCEDURE — 63600175 PHARM REV CODE 636 W HCPCS: Performed by: PHYSICIAN ASSISTANT

## 2024-06-25 PROCEDURE — 83605 ASSAY OF LACTIC ACID: CPT | Performed by: INTERNAL MEDICINE

## 2024-06-25 PROCEDURE — 36415 COLL VENOUS BLD VENIPUNCTURE: CPT | Performed by: INTERNAL MEDICINE

## 2024-06-25 PROCEDURE — 97162 PT EVAL MOD COMPLEX 30 MIN: CPT

## 2024-06-25 PROCEDURE — 83735 ASSAY OF MAGNESIUM: CPT | Performed by: PHYSICIAN ASSISTANT

## 2024-06-25 PROCEDURE — 51798 US URINE CAPACITY MEASURE: CPT

## 2024-06-25 PROCEDURE — 27000207 HC ISOLATION

## 2024-06-25 PROCEDURE — 94640 AIRWAY INHALATION TREATMENT: CPT | Mod: XB

## 2024-06-25 PROCEDURE — 63600175 PHARM REV CODE 636 W HCPCS: Performed by: EMERGENCY MEDICINE

## 2024-06-25 PROCEDURE — 80053 COMPREHEN METABOLIC PANEL: CPT | Performed by: INTERNAL MEDICINE

## 2024-06-25 PROCEDURE — C9113 INJ PANTOPRAZOLE SODIUM, VIA: HCPCS | Performed by: PHYSICIAN ASSISTANT

## 2024-06-25 PROCEDURE — 51702 INSERT TEMP BLADDER CATH: CPT

## 2024-06-25 PROCEDURE — 83605 ASSAY OF LACTIC ACID: CPT | Mod: 91 | Performed by: PHYSICIAN ASSISTANT

## 2024-06-25 PROCEDURE — 11000001 HC ACUTE MED/SURG PRIVATE ROOM

## 2024-06-25 PROCEDURE — 63600175 PHARM REV CODE 636 W HCPCS: Performed by: INTERNAL MEDICINE

## 2024-06-25 PROCEDURE — 25000003 PHARM REV CODE 250: Performed by: PHYSICIAN ASSISTANT

## 2024-06-25 PROCEDURE — 96372 THER/PROPH/DIAG INJ SC/IM: CPT | Performed by: INTERNAL MEDICINE

## 2024-06-25 PROCEDURE — 27000221 HC OXYGEN, UP TO 24 HOURS

## 2024-06-25 PROCEDURE — 63600175 PHARM REV CODE 636 W HCPCS: Mod: JZ,TB | Performed by: INTERNAL MEDICINE

## 2024-06-25 PROCEDURE — 99233 SBSQ HOSP IP/OBS HIGH 50: CPT | Mod: GT,,, | Performed by: PHYSICIAN ASSISTANT

## 2024-06-25 PROCEDURE — 99900035 HC TECH TIME PER 15 MIN (STAT)

## 2024-06-25 PROCEDURE — 85025 COMPLETE CBC W/AUTO DIFF WBC: CPT | Performed by: INTERNAL MEDICINE

## 2024-06-25 PROCEDURE — 94761 N-INVAS EAR/PLS OXIMETRY MLT: CPT

## 2024-06-25 RX ORDER — BISACODYL 10 MG/1
10 SUPPOSITORY RECTAL ONCE
Status: COMPLETED | OUTPATIENT
Start: 2024-06-25 | End: 2024-06-25

## 2024-06-25 RX ORDER — PANTOPRAZOLE SODIUM 40 MG/10ML
40 INJECTION, POWDER, LYOPHILIZED, FOR SOLUTION INTRAVENOUS DAILY
Status: DISCONTINUED | OUTPATIENT
Start: 2024-06-25 | End: 2024-07-01 | Stop reason: HOSPADM

## 2024-06-25 RX ADMIN — SODIUM CHLORIDE: 9 INJECTION, SOLUTION INTRAVENOUS at 08:06

## 2024-06-25 RX ADMIN — DEXAMETHASONE SODIUM PHOSPHATE 6 MG: 4 INJECTION, SOLUTION INTRA-ARTICULAR; INTRALESIONAL; INTRAMUSCULAR; INTRAVENOUS; SOFT TISSUE at 08:06

## 2024-06-25 RX ADMIN — PIPERACILLIN SODIUM AND TAZOBACTAM SODIUM 4.5 G: 4; .5 INJECTION, POWDER, LYOPHILIZED, FOR SOLUTION INTRAVENOUS at 03:06

## 2024-06-25 RX ADMIN — ALBUTEROL SULFATE 2 PUFF: 90 AEROSOL, METERED RESPIRATORY (INHALATION) at 07:06

## 2024-06-25 RX ADMIN — PANTOPRAZOLE SODIUM 40 MG: 40 INJECTION, POWDER, FOR SOLUTION INTRAVENOUS at 08:06

## 2024-06-25 RX ADMIN — PIPERACILLIN SODIUM AND TAZOBACTAM SODIUM 4.5 G: 4; .5 INJECTION, POWDER, LYOPHILIZED, FOR SOLUTION INTRAVENOUS at 06:06

## 2024-06-25 RX ADMIN — BISACODYL 10 MG: 10 SUPPOSITORY RECTAL at 05:06

## 2024-06-25 RX ADMIN — REMDESIVIR 100 MG: 100 INJECTION, POWDER, LYOPHILIZED, FOR SOLUTION INTRAVENOUS at 08:06

## 2024-06-25 RX ADMIN — PIPERACILLIN SODIUM AND TAZOBACTAM SODIUM 4.5 G: 4; .5 INJECTION, POWDER, LYOPHILIZED, FOR SOLUTION INTRAVENOUS at 12:06

## 2024-06-25 RX ADMIN — ENOXAPARIN SODIUM 40 MG: 40 INJECTION SUBCUTANEOUS at 04:06

## 2024-06-25 RX ADMIN — ALBUTEROL SULFATE 2 PUFF: 90 AEROSOL, METERED RESPIRATORY (INHALATION) at 02:06

## 2024-06-25 NOTE — EICU
Intervention Initiated From:  [facility] / EICU    Samir intervened regarding:  Rounding (Video assessment)    Nurse Notified:  No    Doctor Notified:  No    Comments: Video rounding completed. RT at bedside. VSS.

## 2024-06-25 NOTE — PLAN OF CARE
Problem: Adult Inpatient Plan of Care  Goal: Plan of Care Review  Outcome: Progressing     Problem: Sepsis/Septic Shock  Goal: Optimal Coping  Outcome: Progressing     Problem: Pneumonia  Goal: Fluid Balance  Outcome: Progressing     Problem: Skin Injury Risk Increased  Goal: Skin Health and Integrity  Outcome: Progressing     Problem: Infection  Goal: Absence of Infection Signs and Symptoms  Outcome: Progressing     Problem: Fall Injury Risk  Goal: Absence of Fall and Fall-Related Injury  Outcome: Progressing

## 2024-06-25 NOTE — PLAN OF CARE
Care plan reviewed and updated. Cont with IVF and abx. Urine culture collected. Covid precautions maintained. Jones cath placed due to retention. Bladder scan X2. In and out cath. Refused medicine last night or snack. Dr. Mckee notified. Calm

## 2024-06-25 NOTE — PLAN OF CARE
[place] [facility] (3rd Fl)  Discharge Assessment    Primary Care Provider: Luna Kelly NP     Discharge Assessment (most recent)       BRIEF DISCHARGE ASSESSMENT - 06/25/24 1110          Discharge Planning    Assessment Type Discharge Planning Brief Assessment     Resource/Environmental Concerns none     Support Systems Parent;Family members;Friends/neighbors     Equipment Currently Used at Home grab bar (P)      Current Living Arrangements home (P)      Patient/Family Anticipates Transition to home with family (P)      Patient/Family Anticipated Services at Transition other (see comments) (P)    TBD    DME Needed Upon Discharge  other (see comments) (P)    TBD    Discharge Plan A Home with family (P)      Discharge Plan B Home with family (P)                      Discharge assessment completed. Patient lives at home with his father who is currently his caregiver. No post acute needs identified at this time. CM to remain available should needs arise.

## 2024-06-25 NOTE — PT/OT/SLP EVAL
"Physical Therapy Evaluation    Patient Name:  Janes Strange   MRN:  [id number]    Recommendations:     Discharge Recommendations: Moderate Intensity Therapy   Discharge Equipment Recommendations: other (see comments) (continue to assess)   Barriers to discharge: Decreased caregiver support and does not participate and does not follow directions.    Assessment:     Janes Strange is a 54 y.o. male admitted with a medical diagnosis of Sepsis due to pneumonia.  He presents with the following impairments/functional limitations: weakness, decreased ROM, gait instability, impaired functional mobility, impaired self care skills, impaired balance, decreased lower extremity function, decreased safety awareness, impaired cognition, decreased coordination, impaired endurance.    Rehab Prognosis: Fair; patient would benefit from acute skilled PT services to address these deficits and reach maximum level of function.    Recent Surgery: * No surgery found *      Plan:     During this hospitalization, patient to be seen 5 x/week to address the identified rehab impairments via gait training, therapeutic activities, therapeutic exercises and progress toward the following goals:    Plan of Care Expires:  07/02/24    Subjective     Chief Complaint: legs weakness - per patient's Father  Patient/Family Comments/goals: "to receive ROM ex on the legs".  Pain/Comfort:  Pain Rating 1: 0/10    Patients cultural, spiritual, Christianity conflicts given the current situation: no    Living Environment:  Patient lives with Father in a [facility] with ramp access with 1 handrail  and also has 4 steps with 1 and rail to enter/exit home  Prior to admission, patients level of function was Supervision with functional mobility and gait functions holding on to steady furnitures   Equipment used at home: grab bar.  DME owned (not currently used): none.  Upon discharge, patient will have assistance from Father.    Objective:     Communicated with patient and Father " prior to session.  Patient found supine with bed alarm, peripheral IV, telemetry, oxygen, andrea catheter  upon PT entry to room.    General Precautions: Standard, fall  Orthopedic Precautions:N/A   Braces: N/A  Respiratory Status: Nasal cannula, flow 1 L/min    Exams:  Cognitive Exam:  Patient is disoriented; h/o Down's Syndrome.   Gross Motor Coordination:  WFL  Skin Integrity/Edema:      -       Skin integrity: Visible skin intact  RUE ROM: WFL  RUE Strength: WFL  LUE ROM: WFL  LUE Strength: WFL  RLE ROM: WFL  RLE Strength: WFL  LLE ROM: WFL except left knee with discomfort at end range  LLE Strength: WFL    Functional Mobility:  .Bed Mobility:  requires total assistance at this time due to non participation and resistive upon attempt  Transfers:  requires total assistance at this time due to non participation  Gait: unable due to non participation in out of bed activity.    AM-PAC 6 CLICK MOBILITY  Total Score:12       Treatment & Education:  Educated pt with father the role of PT.  Patient left supine with all lines intact, call button in reach, bed alarm on, nurse  notified, and father present.    GOALS:   Multidisciplinary Problems       Physical Therapy Goals          Problem: Physical Therapy    Goal Priority Disciplines Outcome Goal Variances Interventions   Physical Therapy Goal     PT, PT/OT      Description: Patient will increase functional independence with mobility by performin. Pt able to participate and tolerate ROM ex on B LE x 10 reps on each to prevent joint stiffness and prevent decline in functions.  2. Educate caregiver/family the proper ROM ex  on B LE with good understanding and carry over.  3. Pt able to participate and perform out of bed activity with minimal assistance.                            History:     Past Medical History:   Diagnosis Date    Down's syndrome     Seizure        History reviewed. No pertinent surgical history.    Time Tracking:     PT Received On: 24  PT  Start Time: 1130     PT Stop Time: 1150  PT Total Time (min): 20 min     Billable Minutes: Evaluation 20 06/25/2024

## 2024-06-25 NOTE — NURSING
Patient resting in bed-awake and alert. 1 L nasal cannula. Tele sinus tach. Father at bedside. Handoff report given to CHINA Pollard.

## 2024-06-25 NOTE — NURSING
Pt up to room 304. Pt awake and alert. NADN. Father at bedside. Bedside report received from CHINA Jones.

## 2024-06-25 NOTE — ASSESSMENT & PLAN NOTE
Bilateral lobe hazziness L>R on CXR  Coverage for bacterial pneumonia although procal wnl.      Antibiotics (From admission, onward)      Start     Stop Route Frequency Ordered    06/24/24 1900  piperacillin-tazobactam (ZOSYN) 4.5 g in D5W 100 mL IVPB (MB+)         -- IV Every 8 hours (non-standard times) 06/24/24 1355            Microbiology Results (last 7 days)       Procedure Component Value Units Date/Time    Blood culture #2 **CANNOT BE ORDERED STAT** [[id number]] Collected: 06/24/24 1036    Order Status: Completed Specimen: Blood from Peripheral, Forearm, Left Updated: 06/24/24 2145     Blood Culture, Routine No Growth to date    Blood culture #1 **CANNOT BE ORDERED STAT** [[id number]] Collected: 06/24/24 1042    Order Status: Completed Specimen: Blood from Peripheral, Hand, Left Updated: 06/24/24 2145     Blood Culture, Routine No Growth to date    Blood culture [[id number]]     Order Status: Canceled Specimen: Blood     Blood culture [[id number]]     Order Status: Canceled Specimen: Blood     Influenza A & B by Molecular [[id number]] Collected: 06/24/24 1026    Order Status: Completed Specimen: Nasopharyngeal Swab Updated: 06/24/24 1102     Influenza A, Molecular Negative     Influenza B, Molecular Negative     Flu A & B Source Nasal swab    Group A Strep, Molecular [[id number]] Collected: 06/24/24 1026    Order Status: Completed Specimen: Throat Updated: 06/24/24 1053     Group A Strep, Molecular Negative     Comment: Arcanobacterium haemolyticum and Beta Streptococcus group C   and G will not be detected by this test method.  Please order   Throat Culture ([id number]) if suspected.

## 2024-06-25 NOTE — ASSESSMENT & PLAN NOTE
Lactic acid 4.1 at presentation and now 2.8     6/25 Repeat lactic acid yesterday trending up to 3.1.  Currently only IV fluids.  Repeat lactic acid pending.

## 2024-06-25 NOTE — ASSESSMENT & PLAN NOTE
Patient is identified as Severe COVID-19 based on hypoxemia with O2 saturations <94% on room air or on ambulation   Initiate standard COVID protocols; COVID-19 testing ,Infection Control notification  and isolation- respiratory, contact and droplet per protocol    Diagnostics: CBC, CMP, Ferritin, CRP, and Portable CXR    Management: Maintain oxygen saturations 92-96% via currently on room air and monitor with continuous/intermittent pulse oximetry. , Inhaled bronchodilators as needed for shortness of breath., and Continuous cardiac monitoring.    Advance Care Planning  Current advance care plan has not been discussed with patient. Attempted to contact father     Weaning supplemental oxygen.  Lovenox, Remdesivir, dexamethasone, albuterol.

## 2024-06-25 NOTE — HOSPITAL COURSE
PT admitted for sepsis secondary to covid pneumonia.  Hypoxia occurs when sleeping and will wean supplemental oxygen.  CXR pending.  Jones catheter in place due to urinary retention.  U/A negative.  Abdomen distended at times but then relaxes.  KUB pending.  Lactic acid elevated yesterday.  Will get repeat lactic acid this morning.      6/26: PT HD stable on 1 L of supplemental oxygen with oxygen saturation 95%.  Plan to wean supplemental oxygen and discharge within the next 24 hours.        6/27 Yesterday patient was able to be weaned off oxygen but became hypoxic overnight.  PT currently HD stable on 2-3 L nasal cannula.  CXR pending.  Respiratory exam with good air movement and no wheezing.  Will attempt to wean supplemental oxygen today.      6/28 Worsening hypoxia and now on 6 L.  Added vancomycin to IV zosyn.  Patient's last day of remdesivir for covid is today.  Discussed with pulmonology and will likely round on patient tomorrow.  Discussed plan of care with dad.      6/29 patient is still on 6 L nasal cannula but seems to be stable.  Her O2 sats are in the mid 90 percentile.  Patient is still on Decadron, vancomycin, Zosyn.  He seems to be getting better.    6/30 patient now on 3 L nasal cannula.  He is looking much better.  He is starting to eat.  He makes good eye contact and tries to answers questions.  No sign of respiratory distress      7/1 PT now on room air and ambulatory oxygen saturation with PT 94%.  Will discharge with home health.  Course of doxycycline ordered for MRSA coverage.

## 2024-06-25 NOTE — ASSESSMENT & PLAN NOTE
This patient does have evidence of infective focus  My overall impression is sepsis.  Source: Respiratory  Antibiotics given-   Antibiotics (72h ago, onward)      Start     Stop Route Frequency Ordered    06/24/24 1900  piperacillin-tazobactam (ZOSYN) 4.5 g in D5W 100 mL IVPB (MB+)         -- IV Every 8 hours (non-standard times) 06/24/24 1355          Latest lactate reviewed-  Recent Labs   Lab 06/24/24  2154   LACTATE 3.1*       Organ dysfunction indicated by pulmonary edema on CXR     Fluid challenge 2 L bolus     Post- resuscitation assessment No - Post resuscitation assessment not needed     CXR pulmonary edema, L lobe consolidation   Will Not start Pressors- Levophed for MAP of 65  Source control achieved by: IV zosyn, IV remdesivir  Blood cultures pending/ U/A negative   BNP, procal & troponin wnl

## 2024-06-25 NOTE — PROGRESS NOTES
[facility] Medicine  Progress Note    Patient Name: Janes Strange  MRN: [id number]  Patient Class: OP- Observation   Admission Date: 6/24/2024  Length of Stay: 0 days  Attending Physician: Tristin Mckee MD  Primary Care Provider: Luna Kelly NP        Subjective:     Principal Problem:Sepsis        HPI:  Patient is a 54 year old male with medical history of Down's Syndrome, HLD, TIA and chronic cough who presented to the ED with cough and congestion for 2 weeks.  Patient currently not answering and history obtained from chart review.  Attempted to call father but is currently at eye doctor appointment.        Admitted for sepsis secondary to covid & bacterial pneumonia           Overview/Hospital Course:  PT admitted for sepsis secondary to covid pneumonia.  Hypoxia occurs when sleeping and will wean supplemental oxygen.  CXR pending.  Jones catheter in place due to urinary retention.  U/A negative.  Abdomen distended at times but then relaxes.  KUB pending.  Lactic acid elevated yesterday.  Will get repeat lactic acid this morning.      Past Medical History:   Diagnosis Date    Down's syndrome     Seizure        History reviewed. No pertinent surgical history.    Review of patient's allergies indicates:   Allergen Reactions    Mayonnaise Blisters    Shellfish containing products      seafood       No current facility-administered medications on file prior to encounter.     Current Outpatient Medications on File Prior to Encounter   Medication Sig    allopurinoL (ZYLOPRIM) 300 MG tablet Take 1 tablet (300 mg total) by mouth once daily.    clopidogreL (PLAVIX) 75 mg tablet Take 1 tablet by mouth once daily    cyanocobalamin 500 MCG tablet Take 500 mcg by mouth once daily.    loratadine (CLARITIN) 10 mg tablet Take 1 tablet (10 mg total) by mouth once daily.    MULTIVITAMIN ORAL Take 1 tablet by mouth once daily. Centrum vitamin    omeprazole (PRILOSEC) 20 MG capsule Take 1 capsule  (20 mg total) by mouth once daily.    rosuvastatin (CRESTOR) 10 MG tablet Take 10 mg by mouth once daily.    vitamin D 1000 units Tab Take 185 mg by mouth once daily.    mupirocin (BACTROBAN) 2 % ointment Apply topically 3 (three) times daily.     Family History    None       Tobacco Use    Smoking status: Never    Smokeless tobacco: Never   Substance and Sexual Activity    Alcohol use: No    Drug use: No    Sexual activity: Never     Review of Systems   Reason unable to perform ROS: patient not answering questions.     Objective:     Vital Signs (Most Recent):  Temp: 97.7 °F (36.5 °C) (06/25/24 0730)  Pulse: 95 (06/25/24 0730)  Resp: (!) 25 (06/25/24 0730)  BP: (!) 143/88 (06/25/24 0700)  SpO2: 98 % (06/25/24 0730) Vital Signs (24h Range):  Temp:  [97.1 °F (36.2 °C)-98.7 °F (37.1 °C)] 97.7 °F (36.5 °C)  Pulse:  [] 95  Resp:  [16-25] 25  SpO2:  [93 %-100 %] 98 %  BP: ()/(52-93) 143/88     Weight: 60 kg (132 lb 4.4 oz)  Body mass index is 27.65 kg/m².     Physical Exam  Constitutional:       General: He is not in acute distress.     Comments: Bent over in bed. Nursing staff stating this is his normal comfortable position    HENT:      Head: Normocephalic and atraumatic.   Eyes:      General:         Right eye: No discharge.         Left eye: No discharge.   Cardiovascular:      Rate and Rhythm: Regular rhythm. Tachycardia present.   Pulmonary:      Effort: Pulmonary effort is normal. No respiratory distress.      Breath sounds: Normal breath sounds.   Abdominal:      General: Bowel sounds are normal. There is distension (intermittent distention but then relaxes abdomen to soft).      Tenderness: There is no abdominal tenderness.   Musculoskeletal:         General: No swelling or tenderness.      Cervical back: Neck supple. No tenderness.   Skin:     General: Skin is warm and dry.   Neurological:      General: No focal deficit present.      Mental Status: He is alert. Mental status is at baseline.       "Comments: Makes groaning noises    Psychiatric:         Mood and Affect: Mood normal.         Behavior: Behavior normal.                Significant Labs: A1C: No results for input(s): "HGBA1C" in the last 4320 hours.  ABGs: No results for input(s): "PH", "PCO2", "HCO3", "POCSATURATED", "BE", "TOTALHB", "COHB", "METHB", "O2HB", "POCFIO2", "PO2" in the last 48 hours.  Bilirubin:   Recent Labs   Lab 06/24/24  1042 06/25/24  0433   BILITOT 0.4 0.5  0.5     Blood Culture:   Recent Labs   Lab 06/24/24  1036 06/24/24  1042   LABBLOO No Growth to date No Growth to date     BMP:   Recent Labs   Lab 06/25/24  0433     110     139   K 4.0  4.0     107   CO2 22*  22*   BUN 14  14   CREATININE 0.9  0.9   CALCIUM 8.4*  8.4*   MG 1.8     CBC:   Recent Labs   Lab 06/24/24  1042 06/25/24  0433   WBC 14.14* 14.97*  14.97*   HGB 15.7 14.5  14.5   HCT 47.7 43.3  43.3    396  396     CMP:   Recent Labs   Lab 06/24/24  1042 06/24/24  2154 06/25/24  0433    140 139  139   K 4.4 4.1 4.0  4.0    106 107  107   CO2 24 20* 22*  22*   * 119* 110  110   BUN 21* 15 14  14   CREATININE 1.2 0.9 0.9  0.9   CALCIUM 9.7 8.5* 8.4*  8.4*   PROT 7.7  --  6.6  6.6   ALBUMIN 3.2*  --  2.8*  2.8*   BILITOT 0.4  --  0.5  0.5   ALKPHOS 72  --  67  67   AST 31  --  39  39   ALT 29  --  28  28   ANIONGAP 15 14 10  10     Cardiac Markers:   Recent Labs   Lab 06/24/24  1042   BNP 10     Coagulation: No results for input(s): "PT", "INR", "APTT" in the last 48 hours.  Lactic Acid:   Recent Labs   Lab 06/24/24  1424 06/24/24  1753 06/24/24  2154   LACTATE 3.2* 4.5* 3.1*     Lipase: No results for input(s): "LIPASE" in the last 48 hours.  Lipid Panel: No results for input(s): "CHOL", "HDL", "LDLCALC", "TRIG", "CHOLHDL" in the last 48 hours.  Magnesium:   Recent Labs   Lab 06/25/24  0433   MG 1.8     POCT Glucose: No results for input(s): "POCTGLUCOSE" in the last 48 hours.  Prealbumin: No " "results for input(s): "PREALBUMIN" in the last 48 hours.  Respiratory Culture: No results for input(s): "GSRESP", "RESPIRATORYC" in the last 48 hours.  Troponin:   Recent Labs   Lab 06/24/24  1042 06/24/24  1424   TROPONINI <0.006 <0.006     TSH:   Recent Labs   Lab 02/20/24  0831   TSH 4.622*     Urine Culture: No results for input(s): "LABURIN" in the last 48 hours.  Urine Studies:   Recent Labs   Lab 06/24/24  2150   COLORU Yellow   APPEARANCEUA Clear   PHUR 7.0   SPECGRAV 1.015   PROTEINUA 1+*   GLUCUA Negative   KETONESU Negative   BILIRUBINUA Negative   OCCULTUA Negative   NITRITE Negative   UROBILINOGEN Negative   LEUKOCYTESUR Negative   RBCUA 0   WBCUA 0   BACTERIA Rare   HYALINECASTS 0       Significant Imaging: I have reviewed all pertinent imaging results/findings within the past 24 hours.    Assessment/Plan:      * Sepsis  This patient does have evidence of infective focus  My overall impression is sepsis.  Source: Respiratory  Antibiotics given-   Antibiotics (72h ago, onward)      Start     Stop Route Frequency Ordered    06/24/24 1900  piperacillin-tazobactam (ZOSYN) 4.5 g in D5W 100 mL IVPB (MB+)         -- IV Every 8 hours (non-standard times) 06/24/24 1355          Latest lactate reviewed-  Recent Labs   Lab 06/24/24  2154   LACTATE 3.1*       Organ dysfunction indicated by pulmonary edema on CXR     Fluid challenge 2 L bolus     Post- resuscitation assessment No - Post resuscitation assessment not needed     CXR pulmonary edema, L lobe consolidation   Will Not start Pressors- Levophed for MAP of 65  Source control achieved by: IV zosyn, IV remdesivir  Blood cultures pending/ U/A negative   BNP, procal & troponin wnl     Abdominal distention  Intermittent  KUB pending  Last bowel movement 2-3 days ago per nursing staff      Urinary retention  Jones catheter in place       Pneumonia  Bilateral lobe hazziness L>R on CXR  Coverage for bacterial pneumonia although procal wnl.      Antibiotics (From " admission, onward)      Start     Stop Route Frequency Ordered    06/24/24 1900  piperacillin-tazobactam (ZOSYN) 4.5 g in D5W 100 mL IVPB (MB+)         -- IV Every 8 hours (non-standard times) 06/24/24 1355            Microbiology Results (last 7 days)       Procedure Component Value Units Date/Time    Blood culture #2 **CANNOT BE ORDERED STAT** [[id number]] Collected: 06/24/24 1036    Order Status: Completed Specimen: Blood from Peripheral, Forearm, Left Updated: 06/24/24 2145     Blood Culture, Routine No Growth to date    Blood culture #1 **CANNOT BE ORDERED STAT** [[id number]] Collected: 06/24/24 1042    Order Status: Completed Specimen: Blood from Peripheral, Hand, Left Updated: 06/24/24 2145     Blood Culture, Routine No Growth to date    Blood culture [[id number]]     Order Status: Canceled Specimen: Blood     Blood culture [[id number]]     Order Status: Canceled Specimen: Blood     Influenza A & B by Molecular [[id number]] Collected: 06/24/24 1026    Order Status: Completed Specimen: Nasopharyngeal Swab Updated: 06/24/24 1102     Influenza A, Molecular Negative     Influenza B, Molecular Negative     Flu A & B Source Nasal swab    Group A Strep, Molecular [[id number]] Collected: 06/24/24 1026    Order Status: Completed Specimen: Throat Updated: 06/24/24 1053     Group A Strep, Molecular Negative     Comment: Arcanobacterium haemolyticum and Beta Streptococcus group C   and G will not be detected by this test method.  Please order   Throat Culture ([id number]) if suspected.                 Elevated lactic acid level  Lactic acid 4.1 at presentation and now 2.8     6/25 Repeat lactic acid yesterday trending up to 3.1.  Currently only IV fluids.  Repeat lactic acid pending.        COVID-19  Patient is identified as Severe COVID-19 based on hypoxemia with O2 saturations <94% on room air or on ambulation   Initiate standard COVID protocols; COVID-19 testing ,Infection Control notification  and isolation- respiratory,  contact and droplet per protocol    Diagnostics: CBC, CMP, Ferritin, CRP, and Portable CXR    Management: Maintain oxygen saturations 92-96% via currently on room air and monitor with continuous/intermittent pulse oximetry. , Inhaled bronchodilators as needed for shortness of breath., and Continuous cardiac monitoring.    Advance Care Planning Current advance care plan has not been discussed with patient. Attempted to contact father     Weaning supplemental oxygen.  Lovenox, Remdesivir, dexamethasone, albuterol.      GERD (gastroesophageal reflux disease)  Continue protonix       HLD (hyperlipidemia)  Continue statin       History of TIA (transient ischemic attack)  Continue atorvastatin and plavix      Gout  Continue allopurinol      Down syndrome  Received haloperidol in the ED   Currently calm, awake but nonconversant        VTE Risk Mitigation (From admission, onward)           Ordered     enoxaparin injection 40 mg  Every 24 hours         06/24/24 0360                    Discharge Planning   CASTILLO:      Code Status: Full Code   Is the patient medically ready for discharge?:     Reason for patient still in hospital (select all that apply): Patient trending condition               Critical care time spent on the evaluation and treatment of severe organ dysfunction, review of pertinent labs and imaging studies, discussions with consulting providers and discussions with patient/family: 35 minutes.      Desire Skaggs PA-C  Department of Hospital Medicine   [place] - Intensive Care

## 2024-06-25 NOTE — SUBJECTIVE & OBJECTIVE
Past Medical History:   Diagnosis Date    Down's syndrome     Seizure        History reviewed. No pertinent surgical history.    Review of patient's allergies indicates:   Allergen Reactions    Mayonnaise Blisters    Shellfish containing products      seafood       No current facility-administered medications on file prior to encounter.     Current Outpatient Medications on File Prior to Encounter   Medication Sig    allopurinoL (ZYLOPRIM) 300 MG tablet Take 1 tablet (300 mg total) by mouth once daily.    clopidogreL (PLAVIX) 75 mg tablet Take 1 tablet by mouth once daily    cyanocobalamin 500 MCG tablet Take 500 mcg by mouth once daily.    loratadine (CLARITIN) 10 mg tablet Take 1 tablet (10 mg total) by mouth once daily.    MULTIVITAMIN ORAL Take 1 tablet by mouth once daily. Centrum vitamin    omeprazole (PRILOSEC) 20 MG capsule Take 1 capsule (20 mg total) by mouth once daily.    rosuvastatin (CRESTOR) 10 MG tablet Take 10 mg by mouth once daily.    vitamin D 1000 units Tab Take 185 mg by mouth once daily.    mupirocin (BACTROBAN) 2 % ointment Apply topically 3 (three) times daily.     Family History    None       Tobacco Use    Smoking status: Never    Smokeless tobacco: Never   Substance and Sexual Activity    Alcohol use: No    Drug use: No    Sexual activity: Never     Review of Systems   Reason unable to perform ROS: patient not answering questions.     Objective:     Vital Signs (Most Recent):  Temp: 97.7 °F (36.5 °C) (06/25/24 0730)  Pulse: 95 (06/25/24 0730)  Resp: (!) 25 (06/25/24 0730)  BP: (!) 143/88 (06/25/24 0700)  SpO2: 98 % (06/25/24 0730) Vital Signs (24h Range):  Temp:  [97.1 °F (36.2 °C)-98.7 °F (37.1 °C)] 97.7 °F (36.5 °C)  Pulse:  [] 95  Resp:  [16-25] 25  SpO2:  [93 %-100 %] 98 %  BP: ()/(52-93) 143/88     Weight: 60 kg (132 lb 4.4 oz)  Body mass index is 27.65 kg/m².     Physical Exam  Constitutional:       General: He is not in acute distress.     Comments: Bent over in bed.  "Nursing staff stating this is his normal comfortable position    HENT:      Head: Normocephalic and atraumatic.   Eyes:      General:         Right eye: No discharge.         Left eye: No discharge.   Cardiovascular:      Rate and Rhythm: Regular rhythm. Tachycardia present.   Pulmonary:      Effort: Pulmonary effort is normal. No respiratory distress.      Breath sounds: Normal breath sounds.   Abdominal:      General: Bowel sounds are normal. There is distension (intermittent distention but then relaxes abdomen to soft).      Tenderness: There is no abdominal tenderness.   Musculoskeletal:         General: No swelling or tenderness.      Cervical back: Neck supple. No tenderness.   Skin:     General: Skin is warm and dry.   Neurological:      General: No focal deficit present.      Mental Status: He is alert. Mental status is at baseline.      Comments: Makes groaning noises    Psychiatric:         Mood and Affect: Mood normal.         Behavior: Behavior normal.                Significant Labs: A1C: No results for input(s): "HGBA1C" in the last 4320 hours.  ABGs: No results for input(s): "PH", "PCO2", "HCO3", "POCSATURATED", "BE", "TOTALHB", "COHB", "METHB", "O2HB", "POCFIO2", "PO2" in the last 48 hours.  Bilirubin:   Recent Labs   Lab 06/24/24  1042 06/25/24  0433   BILITOT 0.4 0.5  0.5     Blood Culture:   Recent Labs   Lab 06/24/24  1036 06/24/24  1042   LABBLOO No Growth to date No Growth to date     BMP:   Recent Labs   Lab 06/25/24  0433     110     139   K 4.0  4.0     107   CO2 22*  22*   BUN 14  14   CREATININE 0.9  0.9   CALCIUM 8.4*  8.4*   MG 1.8     CBC:   Recent Labs   Lab 06/24/24  1042 06/25/24  0433   WBC 14.14* 14.97*  14.97*   HGB 15.7 14.5  14.5   HCT 47.7 43.3  43.3    396  396     CMP:   Recent Labs   Lab 06/24/24  1042 06/24/24  2154 06/25/24  0433    140 139  139   K 4.4 4.1 4.0  4.0    106 107  107   CO2 24 20* 22*  22*   * 119* " "110  110   BUN 21* 15 14  14   CREATININE 1.2 0.9 0.9  0.9   CALCIUM 9.7 8.5* 8.4*  8.4*   PROT 7.7  --  6.6  6.6   ALBUMIN 3.2*  --  2.8*  2.8*   BILITOT 0.4  --  0.5  0.5   ALKPHOS 72  --  67  67   AST 31  --  39  39   ALT 29  --  28  28   ANIONGAP 15 14 10  10     Cardiac Markers:   Recent Labs   Lab 06/24/24  1042   BNP 10     Coagulation: No results for input(s): "PT", "INR", "APTT" in the last 48 hours.  Lactic Acid:   Recent Labs   Lab 06/24/24  1424 06/24/24  1753 06/24/24  2154   LACTATE 3.2* 4.5* 3.1*     Lipase: No results for input(s): "LIPASE" in the last 48 hours.  Lipid Panel: No results for input(s): "CHOL", "HDL", "LDLCALC", "TRIG", "CHOLHDL" in the last 48 hours.  Magnesium:   Recent Labs   Lab 06/25/24  0433   MG 1.8     POCT Glucose: No results for input(s): "POCTGLUCOSE" in the last 48 hours.  Prealbumin: No results for input(s): "PREALBUMIN" in the last 48 hours.  Respiratory Culture: No results for input(s): "GSRESP", "RESPIRATORYC" in the last 48 hours.  Troponin:   Recent Labs   Lab 06/24/24  1042 06/24/24  1424   TROPONINI <0.006 <0.006     TSH:   Recent Labs   Lab 02/20/24  0831   TSH 4.622*     Urine Culture: No results for input(s): "LABURIN" in the last 48 hours.  Urine Studies:   Recent Labs   Lab 06/24/24  2150   COLORU Yellow   APPEARANCEUA Clear   PHUR 7.0   SPECGRAV 1.015   PROTEINUA 1+*   GLUCUA Negative   KETONESU Negative   BILIRUBINUA Negative   OCCULTUA Negative   NITRITE Negative   UROBILINOGEN Negative   LEUKOCYTESUR Negative   RBCUA 0   WBCUA 0   BACTERIA Rare   HYALINECASTS 0       Significant Imaging: I have reviewed all pertinent imaging results/findings within the past 24 hours.  "

## 2024-06-26 LAB
ALBUMIN SERPL BCP-MCNC: 2.8 G/DL (ref 3.5–5.2)
ALP SERPL-CCNC: 63 U/L (ref 55–135)
ALT SERPL W/O P-5'-P-CCNC: 31 U/L (ref 10–44)
ANION GAP SERPL CALC-SCNC: 11 MMOL/L (ref 8–16)
AST SERPL-CCNC: 43 U/L (ref 10–40)
BASOPHILS # BLD AUTO: 0.02 K/UL (ref 0–0.2)
BASOPHILS NFR BLD: 0.1 % (ref 0–1.9)
BILIRUB SERPL-MCNC: 0.7 MG/DL (ref 0.1–1)
BUN SERPL-MCNC: 16 MG/DL (ref 6–20)
CALCIUM SERPL-MCNC: 9 MG/DL (ref 8.7–10.5)
CHLORIDE SERPL-SCNC: 106 MMOL/L (ref 95–110)
CO2 SERPL-SCNC: 23 MMOL/L (ref 23–29)
CREAT SERPL-MCNC: 0.9 MG/DL (ref 0.5–1.4)
DIFFERENTIAL METHOD BLD: ABNORMAL
EOSINOPHIL # BLD AUTO: 0 K/UL (ref 0–0.5)
EOSINOPHIL NFR BLD: 0 % (ref 0–8)
ERYTHROCYTE [DISTWIDTH] IN BLOOD BY AUTOMATED COUNT: 15 % (ref 11.5–14.5)
EST. GFR  (NO RACE VARIABLE): >60 ML/MIN/1.73 M^2
GLUCOSE SERPL-MCNC: 123 MG/DL (ref 70–110)
HCT VFR BLD AUTO: 43.4 % (ref 40–54)
HGB BLD-MCNC: 14.7 G/DL (ref 14–18)
IMM GRANULOCYTES # BLD AUTO: 0.13 K/UL (ref 0–0.04)
IMM GRANULOCYTES NFR BLD AUTO: 0.9 % (ref 0–0.5)
LACTATE SERPL-SCNC: 1.6 MMOL/L (ref 0.5–2.2)
LYMPHOCYTES # BLD AUTO: 2.8 K/UL (ref 1–4.8)
LYMPHOCYTES NFR BLD: 18.8 % (ref 18–48)
MCH RBC QN AUTO: 29.1 PG (ref 27–31)
MCHC RBC AUTO-ENTMCNC: 33.9 G/DL (ref 32–36)
MCV RBC AUTO: 86 FL (ref 82–98)
MONOCYTES # BLD AUTO: 1.2 K/UL (ref 0.3–1)
MONOCYTES NFR BLD: 8.3 % (ref 4–15)
NEUTROPHILS # BLD AUTO: 10.6 K/UL (ref 1.8–7.7)
NEUTROPHILS NFR BLD: 71.9 % (ref 38–73)
NRBC BLD-RTO: 0 /100 WBC
PLATELET # BLD AUTO: 408 K/UL (ref 150–450)
PMV BLD AUTO: 9.8 FL (ref 9.2–12.9)
POTASSIUM SERPL-SCNC: 3.3 MMOL/L (ref 3.5–5.1)
PROT SERPL-MCNC: 6.3 G/DL (ref 6–8.4)
RBC # BLD AUTO: 5.06 M/UL (ref 4.6–6.2)
SODIUM SERPL-SCNC: 140 MMOL/L (ref 136–145)
WBC # BLD AUTO: 14.75 K/UL (ref 3.9–12.7)

## 2024-06-26 PROCEDURE — 83605 ASSAY OF LACTIC ACID: CPT | Performed by: PHYSICIAN ASSISTANT

## 2024-06-26 PROCEDURE — 97116 GAIT TRAINING THERAPY: CPT

## 2024-06-26 PROCEDURE — 97530 THERAPEUTIC ACTIVITIES: CPT

## 2024-06-26 PROCEDURE — 25000003 PHARM REV CODE 250: Performed by: INTERNAL MEDICINE

## 2024-06-26 PROCEDURE — 80053 COMPREHEN METABOLIC PANEL: CPT | Performed by: PHYSICIAN ASSISTANT

## 2024-06-26 PROCEDURE — 99900031 HC PATIENT EDUCATION (STAT)

## 2024-06-26 PROCEDURE — 27000207 HC ISOLATION

## 2024-06-26 PROCEDURE — 36415 COLL VENOUS BLD VENIPUNCTURE: CPT | Performed by: PHYSICIAN ASSISTANT

## 2024-06-26 PROCEDURE — 99233 SBSQ HOSP IP/OBS HIGH 50: CPT | Mod: GT,,, | Performed by: PHYSICIAN ASSISTANT

## 2024-06-26 PROCEDURE — 25000003 PHARM REV CODE 250: Performed by: FAMILY MEDICINE

## 2024-06-26 PROCEDURE — C9113 INJ PANTOPRAZOLE SODIUM, VIA: HCPCS | Performed by: PHYSICIAN ASSISTANT

## 2024-06-26 PROCEDURE — 63600175 PHARM REV CODE 636 W HCPCS: Mod: JZ,TB | Performed by: INTERNAL MEDICINE

## 2024-06-26 PROCEDURE — 94640 AIRWAY INHALATION TREATMENT: CPT

## 2024-06-26 PROCEDURE — 51702 INSERT TEMP BLADDER CATH: CPT

## 2024-06-26 PROCEDURE — 85025 COMPLETE CBC W/AUTO DIFF WBC: CPT | Performed by: PHYSICIAN ASSISTANT

## 2024-06-26 PROCEDURE — 99900035 HC TECH TIME PER 15 MIN (STAT)

## 2024-06-26 PROCEDURE — 27000221 HC OXYGEN, UP TO 24 HOURS

## 2024-06-26 PROCEDURE — 11000001 HC ACUTE MED/SURG PRIVATE ROOM

## 2024-06-26 PROCEDURE — 25000003 PHARM REV CODE 250: Performed by: PHYSICIAN ASSISTANT

## 2024-06-26 PROCEDURE — 84425 ASSAY OF VITAMIN B-1: CPT | Performed by: PHYSICIAN ASSISTANT

## 2024-06-26 PROCEDURE — 63600175 PHARM REV CODE 636 W HCPCS: Performed by: PHYSICIAN ASSISTANT

## 2024-06-26 PROCEDURE — 63600175 PHARM REV CODE 636 W HCPCS: Performed by: EMERGENCY MEDICINE

## 2024-06-26 PROCEDURE — 51798 US URINE CAPACITY MEASURE: CPT

## 2024-06-26 PROCEDURE — 94761 N-INVAS EAR/PLS OXIMETRY MLT: CPT

## 2024-06-26 RX ORDER — TAMSULOSIN HYDROCHLORIDE 0.4 MG/1
0.4 CAPSULE ORAL NIGHTLY
Status: DISCONTINUED | OUTPATIENT
Start: 2024-06-26 | End: 2024-07-01 | Stop reason: HOSPADM

## 2024-06-26 RX ORDER — MUPIROCIN 20 MG/G
OINTMENT TOPICAL 2 TIMES DAILY
Status: COMPLETED | OUTPATIENT
Start: 2024-06-26 | End: 2024-06-30

## 2024-06-26 RX ORDER — SIMETHICONE 80 MG
1 TABLET,CHEWABLE ORAL 3 TIMES DAILY PRN
Status: DISCONTINUED | OUTPATIENT
Start: 2024-06-26 | End: 2024-07-01 | Stop reason: HOSPADM

## 2024-06-26 RX ORDER — SODIUM CHLORIDE 9 MG/ML
INJECTION, SOLUTION INTRAVENOUS
Status: DISCONTINUED | OUTPATIENT
Start: 2024-06-26 | End: 2024-07-01 | Stop reason: HOSPADM

## 2024-06-26 RX ORDER — SODIUM CHLORIDE 0.9 % (FLUSH) 0.9 %
3 SYRINGE (ML) INJECTION
Status: DISCONTINUED | OUTPATIENT
Start: 2024-06-26 | End: 2024-07-01 | Stop reason: HOSPADM

## 2024-06-26 RX ORDER — SODIUM CHLORIDE 9 MG/ML
INJECTION, SOLUTION INTRAVENOUS CONTINUOUS
Status: DISCONTINUED | OUTPATIENT
Start: 2024-06-26 | End: 2024-06-26

## 2024-06-26 RX ADMIN — REMDESIVIR 100 MG: 100 INJECTION, POWDER, LYOPHILIZED, FOR SOLUTION INTRAVENOUS at 09:06

## 2024-06-26 RX ADMIN — MUPIROCIN: 20 OINTMENT TOPICAL at 09:06

## 2024-06-26 RX ADMIN — PIPERACILLIN SODIUM AND TAZOBACTAM SODIUM 4.5 G: 4; .5 INJECTION, POWDER, LYOPHILIZED, FOR SOLUTION INTRAVENOUS at 10:06

## 2024-06-26 RX ADMIN — ALLOPURINOL 300 MG: 100 TABLET ORAL at 08:06

## 2024-06-26 RX ADMIN — PIPERACILLIN SODIUM AND TAZOBACTAM SODIUM 4.5 G: 4; .5 INJECTION, POWDER, LYOPHILIZED, FOR SOLUTION INTRAVENOUS at 06:06

## 2024-06-26 RX ADMIN — SODIUM CHLORIDE: 9 INJECTION, SOLUTION INTRAVENOUS at 08:06

## 2024-06-26 RX ADMIN — DEXAMETHASONE SODIUM PHOSPHATE 6 MG: 4 INJECTION, SOLUTION INTRA-ARTICULAR; INTRALESIONAL; INTRAMUSCULAR; INTRAVENOUS; SOFT TISSUE at 08:06

## 2024-06-26 RX ADMIN — ENOXAPARIN SODIUM 40 MG: 40 INJECTION SUBCUTANEOUS at 04:06

## 2024-06-26 RX ADMIN — Medication 1000 UNITS: at 08:06

## 2024-06-26 RX ADMIN — ALBUTEROL SULFATE 2 PUFF: 90 AEROSOL, METERED RESPIRATORY (INHALATION) at 07:06

## 2024-06-26 RX ADMIN — PANTOPRAZOLE SODIUM 40 MG: 40 INJECTION, POWDER, FOR SOLUTION INTRAVENOUS at 08:06

## 2024-06-26 RX ADMIN — SODIUM CHLORIDE: 9 INJECTION, SOLUTION INTRAVENOUS at 10:06

## 2024-06-26 RX ADMIN — ALBUTEROL SULFATE 2 PUFF: 90 AEROSOL, METERED RESPIRATORY (INHALATION) at 01:06

## 2024-06-26 RX ADMIN — CLOPIDOGREL BISULFATE 75 MG: 75 TABLET ORAL at 08:06

## 2024-06-26 RX ADMIN — PIPERACILLIN SODIUM AND TAZOBACTAM SODIUM 4.5 G: 4; .5 INJECTION, POWDER, LYOPHILIZED, FOR SOLUTION INTRAVENOUS at 03:06

## 2024-06-26 RX ADMIN — TAMSULOSIN HYDROCHLORIDE 0.4 MG: 0.4 CAPSULE ORAL at 09:06

## 2024-06-26 RX ADMIN — ATORVASTATIN CALCIUM 40 MG: 20 TABLET, FILM COATED ORAL at 09:06

## 2024-06-26 RX ADMIN — SIMETHICONE 80 MG: 80 TABLET, CHEWABLE ORAL at 02:06

## 2024-06-26 RX ADMIN — MUPIROCIN: 20 OINTMENT TOPICAL at 08:06

## 2024-06-26 NOTE — SUBJECTIVE & OBJECTIVE
Past Medical History:   Diagnosis Date    Down's syndrome     Seizure        History reviewed. No pertinent surgical history.    Review of patient's allergies indicates:   Allergen Reactions    Mayonnaise Blisters    Shellfish containing products      seafood       No current facility-administered medications on file prior to encounter.     Current Outpatient Medications on File Prior to Encounter   Medication Sig    allopurinoL (ZYLOPRIM) 300 MG tablet Take 1 tablet (300 mg total) by mouth once daily.    clopidogreL (PLAVIX) 75 mg tablet Take 1 tablet by mouth once daily    cyanocobalamin 500 MCG tablet Take 500 mcg by mouth once daily.    loratadine (CLARITIN) 10 mg tablet Take 1 tablet (10 mg total) by mouth once daily.    MULTIVITAMIN ORAL Take 1 tablet by mouth once daily. Centrum vitamin    omeprazole (PRILOSEC) 20 MG capsule Take 1 capsule (20 mg total) by mouth once daily.    rosuvastatin (CRESTOR) 10 MG tablet Take 10 mg by mouth once daily.    vitamin D 1000 units Tab Take 185 mg by mouth once daily.    mupirocin (BACTROBAN) 2 % ointment Apply topically 3 (three) times daily.     Family History    None       Tobacco Use    Smoking status: Never    Smokeless tobacco: Never   Substance and Sexual Activity    Alcohol use: No    Drug use: No    Sexual activity: Never     Review of Systems   Reason unable to perform ROS: patient not answering questions.     Objective:     Vital Signs (Most Recent):  Temp: (!) 95.6 °F (35.3 °C) (06/26/24 0816)  Pulse: 94 (06/26/24 0816)  Resp: 20 (06/26/24 0733)  BP: 130/70 (06/26/24 0816)  SpO2: 96 % (06/26/24 0816) Vital Signs (24h Range):  Temp:  [95.6 °F (35.3 °C)-99.2 °F (37.3 °C)] 95.6 °F (35.3 °C)  Pulse:  [] 94  Resp:  [16-20] 20  SpO2:  [89 %-96 %] 96 %  BP: (108-158)/() 130/70     Weight: 60 kg (132 lb 4.4 oz)  Body mass index is 27.65 kg/m².     Physical Exam  Constitutional:       General: He is not in acute distress.     Comments: Bent over in bed.  "Nursing staff stating this is his normal comfortable position    HENT:      Head: Normocephalic and atraumatic.   Eyes:      General:         Right eye: No discharge.         Left eye: No discharge.   Cardiovascular:      Rate and Rhythm: Normal rate and regular rhythm.   Pulmonary:      Effort: Pulmonary effort is normal. No respiratory distress.      Breath sounds: Normal breath sounds.   Abdominal:      General: Bowel sounds are normal. There is no distension (c).      Palpations: Abdomen is soft.      Tenderness: There is no abdominal tenderness.   Musculoskeletal:         General: No swelling or tenderness.      Cervical back: Neck supple. No tenderness.   Skin:     General: Skin is warm and dry.   Neurological:      General: No focal deficit present.      Mental Status: He is alert. Mental status is at baseline.      Comments: Makes groaning noises when attempting to communicate (this is his baseline)   Psychiatric:         Mood and Affect: Mood normal.         Behavior: Behavior normal.                Significant Labs: A1C: No results for input(s): "HGBA1C" in the last 4320 hours.  ABGs: No results for input(s): "PH", "PCO2", "HCO3", "POCSATURATED", "BE", "TOTALHB", "COHB", "METHB", "O2HB", "POCFIO2", "PO2" in the last 48 hours.  Bilirubin:   Recent Labs   Lab 06/24/24  1042 06/25/24  0433 06/26/24  0756   BILITOT 0.4 0.5  0.5 0.7     Blood Culture:   Recent Labs   Lab 06/24/24  1036 06/24/24  1042   LABBLOO No Growth to date  No Growth to date No Growth to date  No Growth to date     BMP:   Recent Labs   Lab 06/25/24  0433 06/26/24  0756     110 123*     139 140   K 4.0  4.0 3.3*     107 106   CO2 22*  22* 23   BUN 14  14 16   CREATININE 0.9  0.9 0.9   CALCIUM 8.4*  8.4* 9.0   MG 1.8  --      CBC:   Recent Labs   Lab 06/24/24  1042 06/25/24  0433 06/26/24  0756   WBC 14.14* 14.97*  14.97* 14.75*   HGB 15.7 14.5  14.5 14.7   HCT 47.7 43.3  43.3 43.4    396  396 408 " "    CMP:   Recent Labs   Lab 06/24/24  1042 06/24/24  2154 06/25/24  0433 06/26/24  0756    140 139  139 140   K 4.4 4.1 4.0  4.0 3.3*    106 107  107 106   CO2 24 20* 22*  22* 23   * 119* 110  110 123*   BUN 21* 15 14  14 16   CREATININE 1.2 0.9 0.9  0.9 0.9   CALCIUM 9.7 8.5* 8.4*  8.4* 9.0   PROT 7.7  --  6.6  6.6 6.3   ALBUMIN 3.2*  --  2.8*  2.8* 2.8*   BILITOT 0.4  --  0.5  0.5 0.7   ALKPHOS 72  --  67  67 63   AST 31  --  39  39 43*   ALT 29  --  28  28 31   ANIONGAP 15 14 10  10 11     Cardiac Markers:   Recent Labs   Lab 06/24/24  1042   BNP 10     Coagulation: No results for input(s): "PT", "INR", "APTT" in the last 48 hours.  Lactic Acid:   Recent Labs   Lab 06/25/24  0818 06/25/24  1512 06/26/24  0756   LACTATE 2.5* 3.0* 1.6     Lipase: No results for input(s): "LIPASE" in the last 48 hours.  Lipid Panel: No results for input(s): "CHOL", "HDL", "LDLCALC", "TRIG", "CHOLHDL" in the last 48 hours.  Magnesium:   Recent Labs   Lab 06/25/24  0433   MG 1.8     POCT Glucose: No results for input(s): "POCTGLUCOSE" in the last 48 hours.  Prealbumin: No results for input(s): "PREALBUMIN" in the last 48 hours.  Respiratory Culture: No results for input(s): "GSRESP", "RESPIRATORYC" in the last 48 hours.  Troponin:   Recent Labs   Lab 06/24/24  1042 06/24/24  1424   TROPONINI <0.006 <0.006     TSH:   Recent Labs   Lab 02/20/24  0831   TSH 4.622*     Urine Culture: No results for input(s): "LABURIN" in the last 48 hours.  Urine Studies:   Recent Labs   Lab 06/24/24  2150   COLORU Yellow   APPEARANCEUA Clear   PHUR 7.0   SPECGRAV 1.015   PROTEINUA 1+*   GLUCUA Negative   KETONESU Negative   BILIRUBINUA Negative   OCCULTUA Negative   NITRITE Negative   UROBILINOGEN Negative   LEUKOCYTESUR Negative   RBCUA 0   WBCUA 0   BACTERIA Rare   HYALINECASTS 0       Significant Imaging: I have reviewed all pertinent imaging results/findings within the past 24 hours.  "

## 2024-06-26 NOTE — ASSESSMENT & PLAN NOTE
Lactic acid 4.1 at presentation and now 2.8     6/25 Repeat lactic acid yesterday trending up to 3.1.  Currently only IV fluids.  Repeat lactic acid pending.      6/26 Lactic acid today 1.6.

## 2024-06-26 NOTE — PROGRESS NOTES
[facility] ([facility])  [facility] Medicine  Progress Note    Patient Name: Janes Strange  MRN: [id number]  Patient Class: IP- Inpatient   Admission Date: 6/24/2024  Length of Stay: 1 days  Attending Physician: Angie Bain MD  Primary Care Provider: Luna Kelly NP        Subjective:     Principal Problem:Sepsis due to pneumonia        HPI:  Patient is a 54 year old male with medical history of Down's Syndrome, HLD, TIA and chronic cough who presented to the ED with cough and congestion for 2 weeks.  Patient currently not answering and history obtained from chart review.  Attempted to call father but is currently at eye doctor appointment.        Admitted for sepsis secondary to covid & bacterial pneumonia           Overview/Hospital Course:  PT admitted for sepsis secondary to covid pneumonia.  Hypoxia occurs when sleeping and will wean supplemental oxygen.  CXR pending.  Jones catheter in place due to urinary retention.  U/A negative.  Abdomen distended at times but then relaxes.  KUB pending.  Lactic acid elevated yesterday.  Will get repeat lactic acid this morning.      6/26: PT HD stable on 1 L of supplemental oxygen with oxygen saturation 95%.  Plan to wean supplemental oxygen and discharge within the next 24 hours.        Past Medical History:   Diagnosis Date    Down's syndrome     Seizure        History reviewed. No pertinent surgical history.    Review of patient's allergies indicates:   Allergen Reactions    Mayonnaise Blisters    Shellfish containing products      seafood       No current facility-administered medications on file prior to encounter.     Current Outpatient Medications on File Prior to Encounter   Medication Sig    allopurinoL (ZYLOPRIM) 300 MG tablet Take 1 tablet (300 mg total) by mouth once daily.    clopidogreL (PLAVIX) 75 mg tablet Take 1 tablet by mouth once daily    cyanocobalamin 500 MCG tablet Take 500 mcg by mouth once daily.    loratadine (CLARITIN) 10 mg tablet Take 1  tablet (10 mg total) by mouth once daily.    MULTIVITAMIN ORAL Take 1 tablet by mouth once daily. Centrum vitamin    omeprazole (PRILOSEC) 20 MG capsule Take 1 capsule (20 mg total) by mouth once daily.    rosuvastatin (CRESTOR) 10 MG tablet Take 10 mg by mouth once daily.    vitamin D 1000 units Tab Take 185 mg by mouth once daily.    mupirocin (BACTROBAN) 2 % ointment Apply topically 3 (three) times daily.     Family History    None       Tobacco Use    Smoking status: Never    Smokeless tobacco: Never   Substance and Sexual Activity    Alcohol use: No    Drug use: No    Sexual activity: Never     Review of Systems   Reason unable to perform ROS: patient not answering questions.     Objective:     Vital Signs (Most Recent):  Temp: (!) 95.6 °F (35.3 °C) (06/26/24 0816)  Pulse: 94 (06/26/24 0816)  Resp: 20 (06/26/24 0733)  BP: 130/70 (06/26/24 0816)  SpO2: 96 % (06/26/24 0816) Vital Signs (24h Range):  Temp:  [95.6 °F (35.3 °C)-99.2 °F (37.3 °C)] 95.6 °F (35.3 °C)  Pulse:  [] 94  Resp:  [16-20] 20  SpO2:  [89 %-96 %] 96 %  BP: (108-158)/() 130/70     Weight: 60 kg (132 lb 4.4 oz)  Body mass index is 27.65 kg/m².     Physical Exam  Constitutional:       General: He is not in acute distress.     Comments: Bent over in bed. Nursing staff stating this is his normal comfortable position    HENT:      Head: Normocephalic and atraumatic.   Eyes:      General:         Right eye: No discharge.         Left eye: No discharge.   Cardiovascular:      Rate and Rhythm: Normal rate and regular rhythm.   Pulmonary:      Effort: Pulmonary effort is normal. No respiratory distress.      Breath sounds: Normal breath sounds.   Abdominal:      General: Bowel sounds are normal. There is no distension (c).      Palpations: Abdomen is soft.      Tenderness: There is no abdominal tenderness.   Musculoskeletal:         General: No swelling or tenderness.      Cervical back: Neck supple. No tenderness.   Skin:     General: Skin is  "warm and dry.   Neurological:      General: No focal deficit present.      Mental Status: He is alert. Mental status is at baseline.      Comments: Makes groaning noises when attempting to communicate (this is his baseline)   Psychiatric:         Mood and Affect: Mood normal.         Behavior: Behavior normal.                Significant Labs: A1C: No results for input(s): "HGBA1C" in the last 4320 hours.  ABGs: No results for input(s): "PH", "PCO2", "HCO3", "POCSATURATED", "BE", "TOTALHB", "COHB", "METHB", "O2HB", "POCFIO2", "PO2" in the last 48 hours.  Bilirubin:   Recent Labs   Lab 06/24/24  1042 06/25/24  0433 06/26/24  0756   BILITOT 0.4 0.5  0.5 0.7     Blood Culture:   Recent Labs   Lab 06/24/24  1036 06/24/24  1042   LABBLOO No Growth to date  No Growth to date No Growth to date  No Growth to date     BMP:   Recent Labs   Lab 06/25/24  0433 06/26/24  0756     110 123*     139 140   K 4.0  4.0 3.3*     107 106   CO2 22*  22* 23   BUN 14  14 16   CREATININE 0.9  0.9 0.9   CALCIUM 8.4*  8.4* 9.0   MG 1.8  --      CBC:   Recent Labs   Lab 06/24/24  1042 06/25/24  0433 06/26/24  0756   WBC 14.14* 14.97*  14.97* 14.75*   HGB 15.7 14.5  14.5 14.7   HCT 47.7 43.3  43.3 43.4    396  396 408     CMP:   Recent Labs   Lab 06/24/24  1042 06/24/24  2154 06/25/24  0433 06/26/24  0756    140 139  139 140   K 4.4 4.1 4.0  4.0 3.3*    106 107  107 106   CO2 24 20* 22*  22* 23   * 119* 110  110 123*   BUN 21* 15 14  14 16   CREATININE 1.2 0.9 0.9  0.9 0.9   CALCIUM 9.7 8.5* 8.4*  8.4* 9.0   PROT 7.7  --  6.6  6.6 6.3   ALBUMIN 3.2*  --  2.8*  2.8* 2.8*   BILITOT 0.4  --  0.5  0.5 0.7   ALKPHOS 72  --  67  67 63   AST 31  --  39  39 43*   ALT 29  --  28  28 31   ANIONGAP 15 14 10  10 11     Cardiac Markers:   Recent Labs   Lab 06/24/24  1042   BNP 10     Coagulation: No results for input(s): "PT", "INR", "APTT" in the last 48 hours.  Lactic Acid:   Recent " "Labs   Lab 06/25/24  0818 06/25/24  1512 06/26/24  0756   LACTATE 2.5* 3.0* 1.6     Lipase: No results for input(s): "LIPASE" in the last 48 hours.  Lipid Panel: No results for input(s): "CHOL", "HDL", "LDLCALC", "TRIG", "CHOLHDL" in the last 48 hours.  Magnesium:   Recent Labs   Lab 06/25/24  0433   MG 1.8     POCT Glucose: No results for input(s): "POCTGLUCOSE" in the last 48 hours.  Prealbumin: No results for input(s): "PREALBUMIN" in the last 48 hours.  Respiratory Culture: No results for input(s): "GSRESP", "RESPIRATORYC" in the last 48 hours.  Troponin:   Recent Labs   Lab 06/24/24  1042 06/24/24  1424   TROPONINI <0.006 <0.006     TSH:   Recent Labs   Lab 02/20/24  0831   TSH 4.622*     Urine Culture: No results for input(s): "LABURIN" in the last 48 hours.  Urine Studies:   Recent Labs   Lab 06/24/24  2150   COLORU Yellow   APPEARANCEUA Clear   PHUR 7.0   SPECGRAV 1.015   PROTEINUA 1+*   GLUCUA Negative   KETONESU Negative   BILIRUBINUA Negative   OCCULTUA Negative   NITRITE Negative   UROBILINOGEN Negative   LEUKOCYTESUR Negative   RBCUA 0   WBCUA 0   BACTERIA Rare   HYALINECASTS 0       Significant Imaging: I have reviewed all pertinent imaging results/findings within the past 24 hours.    Assessment/Plan:      * Sepsis due to pneumonia  This patient does have evidence of infective focus  My overall impression is sepsis.  Source: Respiratory  Antibiotics given-   Antibiotics (72h ago, onward)      Start     Stop Route Frequency Ordered    06/26/24 0900  mupirocin 2 % ointment         07/01/24 0859 Nasl 2 times daily 06/26/24 0753    06/24/24 1900  piperacillin-tazobactam (ZOSYN) 4.5 g in D5W 100 mL IVPB (MB+)         -- IV Every 8 hours (non-standard times) 06/24/24 1355          Latest lactate reviewed-  Recent Labs   Lab 06/26/24  0756   LACTATE 1.6       Organ dysfunction indicated by pulmonary edema on CXR     Fluid challenge 2 L bolus     Post- resuscitation assessment No - Post resuscitation " assessment not needed     CXR pulmonary edema, L lobe consolidation   Will Not start Pressors- Levophed for MAP of 65  Source control achieved by: IV zosyn, IV remdesivir  Blood cultures pending/ U/A negative   BNP, procal & troponin wnl     Improving     Abdominal distention  Intermittent  KUB pending  Last bowel movement 2-3 days ago per nursing staff    6/26 Improving.  KUB with Bowel gas pattern.  LBM 6/25      Urinary retention  Jones catheter in place   Voiding trial today      Pneumonia  Bilateral lobe hazziness L>R on CXR  Coverage for bacterial pneumonia although procal wnl.      Antibiotics (From admission, onward)      Start     Stop Route Frequency Ordered    06/26/24 0900  mupirocin 2 % ointment         07/01/24 0859 Nasl 2 times daily 06/26/24 0753    06/24/24 1900  piperacillin-tazobactam (ZOSYN) 4.5 g in D5W 100 mL IVPB (MB+)         -- IV Every 8 hours (non-standard times) 06/24/24 1355            Microbiology Results (last 7 days)       Procedure Component Value Units Date/Time    Blood culture #2 **CANNOT BE ORDERED STAT** [[id number]] Collected: 06/24/24 1036    Order Status: Completed Specimen: Blood from Peripheral, Forearm, Left Updated: 06/25/24 1612     Blood Culture, Routine No Growth to date      No Growth to date    Blood culture #1 **CANNOT BE ORDERED STAT** [[id number]] Collected: 06/24/24 1042    Order Status: Completed Specimen: Blood from Peripheral, Hand, Left Updated: 06/25/24 1612     Blood Culture, Routine No Growth to date      No Growth to date    Blood culture [[id number]]     Order Status: Canceled Specimen: Blood     Blood culture [[id number]]     Order Status: Canceled Specimen: Blood     Influenza A & B by Molecular [[id number]] Collected: 06/24/24 1026    Order Status: Completed Specimen: Nasopharyngeal Swab Updated: 06/24/24 1102     Influenza A, Molecular Negative     Influenza B, Molecular Negative     Flu A & B Source Nasal swab    Group A Strep, Molecular  [[id number]] Collected: 06/24/24 1026    Order Status: Completed Specimen: Throat Updated: 06/24/24 1053     Group A Strep, Molecular Negative     Comment: Arcanobacterium haemolyticum and Beta Streptococcus group C   and G will not be detected by this test method.  Please order   Throat Culture ([id number]) if suspected.                 Elevated lactic acid level  Lactic acid 4.1 at presentation and now 2.8     6/25 Repeat lactic acid yesterday trending up to 3.1.  Currently only IV fluids.  Repeat lactic acid pending.      6/26 Lactic acid today 1.6.       COVID-19  Patient is identified as Severe COVID-19 based on hypoxemia with O2 saturations <94% on room air or on ambulation   Initiate standard COVID protocols; COVID-19 testing ,Infection Control notification  and isolation- respiratory, contact and droplet per protocol    Diagnostics: CBC, CMP, Ferritin, CRP, and Portable CXR    Management: Maintain oxygen saturations 92-96% via currently on room air and monitor with continuous/intermittent pulse oximetry. , Inhaled bronchodilators as needed for shortness of breath., and Continuous cardiac monitoring.    Advance Care Planning Current advance care plan has not been discussed with patient. Attempted to contact father     Weaning supplemental oxygen.  Lovenox, Remdesivir, dexamethasone, albuterol.        GERD (gastroesophageal reflux disease)  Continue protonix       HLD (hyperlipidemia)  Continue statin       History of TIA (transient ischemic attack)  Continue atorvastatin and plavix      Gout  Continue allopurinol      Down syndrome  Received haloperidol in the ED   Currently calm, awake but nonconversant        VTE Risk Mitigation (From admission, onward)           Ordered     enoxaparin injection 40 mg  Every 24 hours         06/24/24 4100                    Discharge Planning   CASTILLO:      Code Status: Full Code   Is the patient medically ready for discharge?:     Reason for patient still in hospital (select all  that apply): Patient trending condition  Discharge Plan A: Home with family                  Desire Skaggs PA-C  Department of Hospital Medicine   [facility] (3rd Fl)

## 2024-06-26 NOTE — ASSESSMENT & PLAN NOTE
Intermittent  KUB pending  Last bowel movement 2-3 days ago per nursing staff    6/26 Improving.  KUB with Bowel gas pattern.  LBM 6/25

## 2024-06-26 NOTE — NURSING
Pt continues to be non verbal, had a BM, diaper and pad changed, resisting care and refuses to open mouth to have any po med. Isolation precaution continued per [facility] hospital policy. Safety measures in place call bell in reach, bed in lowest position locked.

## 2024-06-26 NOTE — PLAN OF CARE
Problem: Physical Therapy  Goal: Physical Therapy Goal  Description: Patient will increase functional independence with mobility by performin. Pt able to participate and tolerate ROM ex on B LE x 10 reps on each to prevent joint stiffness and prevent decline in functions.  2. Educate caregiver/family the proper ROM ex  on B LE with good understanding and carry over.  3. Pt able to participate and perform out of bed activity with minimal assistance.  Added goal 24:   4. Able to participate and perform gait functions with or without RW x ~~150 feet with minimal /contact guard assistance  and cues     Outcome: Progressing

## 2024-06-26 NOTE — ASSESSMENT & PLAN NOTE
This patient does have evidence of infective focus  My overall impression is sepsis.  Source: Respiratory  Antibiotics given-   Antibiotics (72h ago, onward)      Start     Stop Route Frequency Ordered    06/26/24 0900  mupirocin 2 % ointment         07/01/24 0859 Nasl 2 times daily 06/26/24 0753    06/24/24 1900  piperacillin-tazobactam (ZOSYN) 4.5 g in D5W 100 mL IVPB (MB+)         -- IV Every 8 hours (non-standard times) 06/24/24 1355          Latest lactate reviewed-  Recent Labs   Lab 06/26/24  0756   LACTATE 1.6       Organ dysfunction indicated by pulmonary edema on CXR     Fluid challenge 2 L bolus     Post- resuscitation assessment No - Post resuscitation assessment not needed     CXR pulmonary edema, L lobe consolidation   Will Not start Pressors- Levophed for MAP of 65  Source control achieved by: IV zosyn, IV remdesivir  Blood cultures pending/ U/A negative   BNP, procal & troponin wnl     Improving

## 2024-06-26 NOTE — PLAN OF CARE
Problem: Adult Inpatient Plan of Care  Goal: Plan of Care Review  Outcome: Progressing  Goal: Patient-Specific Goal (Individualized)  Outcome: Progressing  Goal: Absence of Hospital-Acquired Illness or Injury  Outcome: Progressing  Goal: Optimal Comfort and Wellbeing  Outcome: Progressing  Goal: Readiness for Transition of Care  Outcome: Progressing     Problem: Sepsis/Septic Shock  Goal: Optimal Coping  Outcome: Progressing  Goal: Absence of Bleeding  Outcome: Progressing  Goal: Blood Glucose Level Within Targeted Range  Outcome: Progressing  Goal: Absence of Infection Signs and Symptoms  Outcome: Progressing  Goal: Optimal Nutrition Intake  Outcome: Progressing     Problem: Pneumonia  Goal: Fluid Balance  Outcome: Progressing  Goal: Resolution of Infection Signs and Symptoms  Outcome: Progressing  Goal: Effective Oxygenation and Ventilation  Outcome: Progressing     Problem: Skin Injury Risk Increased  Goal: Skin Health and Integrity  Outcome: Progressing     Problem: Infection  Goal: Absence of Infection Signs and Symptoms  Outcome: Progressing     Problem: Fall Injury Risk  Goal: Absence of Fall and Fall-Related Injury  Outcome: Progressing     Problem: Adult Inpatient Plan of Care  Goal: Plan of Care Review  Outcome: Progressing  Goal: Patient-Specific Goal (Individualized)  Outcome: Progressing  Goal: Absence of Hospital-Acquired Illness or Injury  Outcome: Progressing  Goal: Optimal Comfort and Wellbeing  Outcome: Progressing  Goal: Readiness for Transition of Care  Outcome: Progressing     Problem: Sepsis/Septic Shock  Goal: Optimal Coping  Outcome: Progressing  Goal: Absence of Bleeding  Outcome: Progressing  Goal: Blood Glucose Level Within Targeted Range  Outcome: Progressing  Goal: Absence of Infection Signs and Symptoms  Outcome: Progressing  Goal: Optimal Nutrition Intake  Outcome: Progressing     Problem: Pneumonia  Goal: Fluid Balance  Outcome: Progressing  Goal: Resolution of Infection Signs and  Symptoms  Outcome: Progressing  Goal: Effective Oxygenation and Ventilation  Outcome: Progressing     Problem: Skin Injury Risk Increased  Goal: Skin Health and Integrity  Outcome: Progressing     Problem: Infection  Goal: Absence of Infection Signs and Symptoms  Outcome: Progressing     Problem: Fall Injury Risk  Goal: Absence of Fall and Fall-Related Injury  Outcome: Progressing

## 2024-06-26 NOTE — PT/OT/SLP PROGRESS
"Physical Therapy Treatment    Patient Name:  Janes Strange   MRN:  [id number]    Recommendations:     Discharge Recommendations: Moderate Intensity Therapy  Discharge Equipment Recommendations: other (see comments) (continue to assess)  Barriers to discharge: Decreased caregiver support    Assessment:     Janes Strange is a 54 y.o. male admitted with a medical diagnosis of Sepsis due to pneumonia.  He presents with the following impairments/functional limitations: weakness, impaired endurance, impaired self care skills, impaired functional mobility, gait instability, impaired balance, impaired cognition, decreased safety awareness, decreased coordination, decreased lower extremity function. Patient continue to require passive ROM ex on B LE/UE due to patient does not follow directions to move extremities. However pt showing participation with functional mobility such as sitting up at edge of the bed with minimal assistance and maximum coaxing. Able to perform standing activity with assistive device in front for support and gait functions using back rest of a chair to push x ~40 feet with minimal assistance and constant cues to keep pt on  the task.    Rehab Prognosis: Fair; patient would benefit from acute skilled PT services to address these deficits and reach maximum level of function.    Recent Surgery: * No surgery found *      Plan:     During this hospitalization, patient to be seen 5 x/week to address the identified rehab impairments via gait training, therapeutic activities, therapeutic exercises and progress toward the following goals:    Plan of Care Expires:  07/02/24    Subjective     Chief Complaint: none  Patient/Family Comments/goals: per family - "to get up and walk".  Pain/Comfort:  Pain Rating 1: 0/10      Objective:     Communicated with patient, father and sister prior to session.  Patient found HOB elevated with bed alarm, peripheral IV, PureWick, telemetry upon PT entry to room.     General " Precautions: Standard, fall  Orthopedic Precautions: N/A  Braces: N/A  Respiratory Status: Room air     Functional Mobility:  Bed Mobility:     Rolling Left:  minimum assistance  Rolling Right: minimum assistance  Scooting: minimum assistance  Supine to Sit: minimum assistance  Sit to Supine: minimum assistance  Transfers:     Sit to Stand:  minimum assistance with rolling walker  Gait: Minimal assistance and constant cues x ~40 feet with AD ( back rest of a chair to push) . Dec foot/floor clearance and dec nita  Balance: Sitting: Standby Assistance; Standing with RW; Contact Guard Assistance.      AM-PAC 6 CLICK MOBILITY  Turning over in bed (including adjusting bedclothes, sheets and blankets)?: 3  Sitting down on and standing up from a chair with arms (e.g., wheelchair, bedside commode, etc.): 2  Moving from lying on back to sitting on the side of the bed?: 3  Moving to and from a bed to a chair (including a wheelchair)?: 2  Need to walk in hospital room?: 2  Climbing 3-5 steps with a railing?: 2  Basic Mobility Total Score: 14       Treatment & Education:  PROM ex on B LE/UE x 10 reps at supine position; body sequence trng on bed mobility, sit to stand trng with AD infront and gait trng using AD infront c ~40 feet  with Arianna and cues    Patient left HOB elevated with all lines intact, call button in reach, bed alarm on, nurse  notified, and father and sister present..    GOALS:   Multidisciplinary Problems       Physical Therapy Goals          Problem: Physical Therapy    Goal Priority Disciplines Outcome Goal Variances Interventions   Physical Therapy Goal     PT, PT/OT Progressing     Description: Patient will increase functional independence with mobility by performin. Pt able to participate and tolerate ROM ex on B LE x 10 reps on each to prevent joint stiffness and prevent decline in functions.  2. Educate caregiver/family the proper ROM ex  on B LE with good understanding and carry over.  3. Pt  able to participate and perform out of bed activity with minimal assistance.                            Time Tracking:     PT Received On: 06/26/24  PT Start Time: 1055     PT Stop Time: 1123  PT Total Time (min): 28 min     Billable Minutes: Gait Training 13 and Therapeutic Activity 15    Treatment Type: Treatment  PT/PTA: PT           06/26/2024

## 2024-06-26 NOTE — ASSESSMENT & PLAN NOTE
Bilateral lobe hazziness L>R on CXR  Coverage for bacterial pneumonia although procal wnl.      Antibiotics (From admission, onward)    Start     Stop Route Frequency Ordered    06/26/24 0900  mupirocin 2 % ointment         07/01/24 0859 Nasl 2 times daily 06/26/24 0753    06/24/24 1900  piperacillin-tazobactam (ZOSYN) 4.5 g in D5W 100 mL IVPB (MB+)         -- IV Every 8 hours (non-standard times) 06/24/24 1355          Microbiology Results (last 7 days)     Procedure Component Value Units Date/Time    Blood culture #2 **CANNOT BE ORDERED STAT** [[id number]] Collected: 06/24/24 1036    Order Status: Completed Specimen: Blood from Peripheral, Forearm, Left Updated: 06/25/24 1612     Blood Culture, Routine No Growth to date      No Growth to date    Blood culture #1 **CANNOT BE ORDERED STAT** [[id number]] Collected: 06/24/24 1042    Order Status: Completed Specimen: Blood from Peripheral, Hand, Left Updated: 06/25/24 1612     Blood Culture, Routine No Growth to date      No Growth to date    Blood culture [[id number]]     Order Status: Canceled Specimen: Blood     Blood culture [[id number]]     Order Status: Canceled Specimen: Blood     Influenza A & B by Molecular [[id number]] Collected: 06/24/24 1026    Order Status: Completed Specimen: Nasopharyngeal Swab Updated: 06/24/24 1102     Influenza A, Molecular Negative     Influenza B, Molecular Negative     Flu A & B Source Nasal swab    Group A Strep, Molecular [[id number]] Collected: 06/24/24 1026    Order Status: Completed Specimen: Throat Updated: 06/24/24 1053     Group A Strep, Molecular Negative     Comment: Arcanobacterium haemolyticum and Beta Streptococcus group C   and G will not be detected by this test method.  Please order   Throat Culture ([id number]) if suspected.

## 2024-06-26 NOTE — NURSING
Dr. Chamberlain updated on patient's urinary retention status. Jones 16 FR insertion and patient expressing immediate relief. Orders noted.

## 2024-06-27 PROBLEM — R09.02 HYPOXIA: Status: ACTIVE | Noted: 2024-06-27

## 2024-06-27 LAB
ALBUMIN SERPL BCP-MCNC: 2.7 G/DL (ref 3.5–5.2)
ALP SERPL-CCNC: 63 U/L (ref 55–135)
ALT SERPL W/O P-5'-P-CCNC: 35 U/L (ref 10–44)
ANION GAP SERPL CALC-SCNC: 12 MMOL/L (ref 8–16)
AST SERPL-CCNC: 35 U/L (ref 10–40)
BASOPHILS # BLD AUTO: 0.02 K/UL (ref 0–0.2)
BASOPHILS NFR BLD: 0.1 % (ref 0–1.9)
BILIRUB SERPL-MCNC: 0.6 MG/DL (ref 0.1–1)
BUN SERPL-MCNC: 19 MG/DL (ref 6–20)
CALCIUM SERPL-MCNC: 8.7 MG/DL (ref 8.7–10.5)
CHLORIDE SERPL-SCNC: 106 MMOL/L (ref 95–110)
CO2 SERPL-SCNC: 23 MMOL/L (ref 23–29)
CREAT SERPL-MCNC: 1 MG/DL (ref 0.5–1.4)
D DIMER PPP IA.FEU-MCNC: 1.55 MG/L FEU
DIFFERENTIAL METHOD BLD: ABNORMAL
EOSINOPHIL # BLD AUTO: 0 K/UL (ref 0–0.5)
EOSINOPHIL NFR BLD: 0 % (ref 0–8)
ERYTHROCYTE [DISTWIDTH] IN BLOOD BY AUTOMATED COUNT: 15.3 % (ref 11.5–14.5)
EST. GFR  (NO RACE VARIABLE): >60 ML/MIN/1.73 M^2
GLUCOSE SERPL-MCNC: 156 MG/DL (ref 70–110)
HCT VFR BLD AUTO: 48.2 % (ref 40–54)
HGB BLD-MCNC: 16.5 G/DL (ref 14–18)
IMM GRANULOCYTES # BLD AUTO: 0.14 K/UL (ref 0–0.04)
IMM GRANULOCYTES NFR BLD AUTO: 1 % (ref 0–0.5)
LACTATE SERPL-SCNC: 2.9 MMOL/L (ref 0.5–2.2)
LYMPHOCYTES # BLD AUTO: 1.3 K/UL (ref 1–4.8)
LYMPHOCYTES NFR BLD: 9.7 % (ref 18–48)
MAGNESIUM SERPL-MCNC: 1.8 MG/DL (ref 1.6–2.6)
MCH RBC QN AUTO: 29.6 PG (ref 27–31)
MCHC RBC AUTO-ENTMCNC: 34.2 G/DL (ref 32–36)
MCV RBC AUTO: 87 FL (ref 82–98)
MONOCYTES # BLD AUTO: 0.5 K/UL (ref 0.3–1)
MONOCYTES NFR BLD: 3.7 % (ref 4–15)
NEUTROPHILS # BLD AUTO: 11.8 K/UL (ref 1.8–7.7)
NEUTROPHILS NFR BLD: 85.5 % (ref 38–73)
NRBC BLD-RTO: 0 /100 WBC
PLATELET # BLD AUTO: 389 K/UL (ref 150–450)
PMV BLD AUTO: 9.8 FL (ref 9.2–12.9)
POTASSIUM SERPL-SCNC: 3.4 MMOL/L (ref 3.5–5.1)
PROT SERPL-MCNC: 6.3 G/DL (ref 6–8.4)
RBC # BLD AUTO: 5.57 M/UL (ref 4.6–6.2)
SODIUM SERPL-SCNC: 141 MMOL/L (ref 136–145)
WBC # BLD AUTO: 13.83 K/UL (ref 3.9–12.7)

## 2024-06-27 PROCEDURE — 80053 COMPREHEN METABOLIC PANEL: CPT | Performed by: PHYSICIAN ASSISTANT

## 2024-06-27 PROCEDURE — 97110 THERAPEUTIC EXERCISES: CPT

## 2024-06-27 PROCEDURE — 25000003 PHARM REV CODE 250: Performed by: PHYSICIAN ASSISTANT

## 2024-06-27 PROCEDURE — 25000003 PHARM REV CODE 250: Performed by: INTERNAL MEDICINE

## 2024-06-27 PROCEDURE — 25000003 PHARM REV CODE 250: Performed by: FAMILY MEDICINE

## 2024-06-27 PROCEDURE — 94640 AIRWAY INHALATION TREATMENT: CPT

## 2024-06-27 PROCEDURE — 63600175 PHARM REV CODE 636 W HCPCS: Performed by: EMERGENCY MEDICINE

## 2024-06-27 PROCEDURE — 99900035 HC TECH TIME PER 15 MIN (STAT)

## 2024-06-27 PROCEDURE — 27000221 HC OXYGEN, UP TO 24 HOURS

## 2024-06-27 PROCEDURE — 63600175 PHARM REV CODE 636 W HCPCS: Mod: JZ,TB | Performed by: INTERNAL MEDICINE

## 2024-06-27 PROCEDURE — 36415 COLL VENOUS BLD VENIPUNCTURE: CPT | Performed by: PHYSICIAN ASSISTANT

## 2024-06-27 PROCEDURE — 11000001 HC ACUTE MED/SURG PRIVATE ROOM

## 2024-06-27 PROCEDURE — 99233 SBSQ HOSP IP/OBS HIGH 50: CPT | Mod: ,,, | Performed by: PHYSICIAN ASSISTANT

## 2024-06-27 PROCEDURE — 25500020 PHARM REV CODE 255: Performed by: INTERNAL MEDICINE

## 2024-06-27 PROCEDURE — 94761 N-INVAS EAR/PLS OXIMETRY MLT: CPT

## 2024-06-27 PROCEDURE — 83735 ASSAY OF MAGNESIUM: CPT | Performed by: PHYSICIAN ASSISTANT

## 2024-06-27 PROCEDURE — 27000207 HC ISOLATION

## 2024-06-27 PROCEDURE — 83605 ASSAY OF LACTIC ACID: CPT | Performed by: PHYSICIAN ASSISTANT

## 2024-06-27 PROCEDURE — 85379 FIBRIN DEGRADATION QUANT: CPT | Performed by: PHYSICIAN ASSISTANT

## 2024-06-27 PROCEDURE — 63600175 PHARM REV CODE 636 W HCPCS: Performed by: PHYSICIAN ASSISTANT

## 2024-06-27 PROCEDURE — C9113 INJ PANTOPRAZOLE SODIUM, VIA: HCPCS | Performed by: PHYSICIAN ASSISTANT

## 2024-06-27 PROCEDURE — 85025 COMPLETE CBC W/AUTO DIFF WBC: CPT | Performed by: PHYSICIAN ASSISTANT

## 2024-06-27 RX ORDER — DEXAMETHASONE SODIUM PHOSPHATE 4 MG/ML
6 INJECTION, SOLUTION INTRA-ARTICULAR; INTRALESIONAL; INTRAMUSCULAR; INTRAVENOUS; SOFT TISSUE EVERY 24 HOURS
Status: DISCONTINUED | OUTPATIENT
Start: 2024-06-28 | End: 2024-07-01 | Stop reason: HOSPADM

## 2024-06-27 RX ADMIN — ALBUTEROL SULFATE 2 PUFF: 90 AEROSOL, METERED RESPIRATORY (INHALATION) at 07:06

## 2024-06-27 RX ADMIN — MUPIROCIN: 20 OINTMENT TOPICAL at 08:06

## 2024-06-27 RX ADMIN — MUPIROCIN: 20 OINTMENT TOPICAL at 09:06

## 2024-06-27 RX ADMIN — PIPERACILLIN SODIUM AND TAZOBACTAM SODIUM 4.5 G: 4; .5 INJECTION, POWDER, LYOPHILIZED, FOR SOLUTION INTRAVENOUS at 11:06

## 2024-06-27 RX ADMIN — REMDESIVIR 100 MG: 100 INJECTION, POWDER, LYOPHILIZED, FOR SOLUTION INTRAVENOUS at 10:06

## 2024-06-27 RX ADMIN — PIPERACILLIN SODIUM AND TAZOBACTAM SODIUM 4.5 G: 4; .5 INJECTION, POWDER, LYOPHILIZED, FOR SOLUTION INTRAVENOUS at 06:06

## 2024-06-27 RX ADMIN — ATORVASTATIN CALCIUM 40 MG: 20 TABLET, FILM COATED ORAL at 09:06

## 2024-06-27 RX ADMIN — CLOPIDOGREL BISULFATE 75 MG: 75 TABLET ORAL at 08:06

## 2024-06-27 RX ADMIN — PANTOPRAZOLE SODIUM 40 MG: 40 INJECTION, POWDER, FOR SOLUTION INTRAVENOUS at 08:06

## 2024-06-27 RX ADMIN — PIPERACILLIN SODIUM AND TAZOBACTAM SODIUM 4.5 G: 4; .5 INJECTION, POWDER, LYOPHILIZED, FOR SOLUTION INTRAVENOUS at 03:06

## 2024-06-27 RX ADMIN — TAMSULOSIN HYDROCHLORIDE 0.4 MG: 0.4 CAPSULE ORAL at 09:06

## 2024-06-27 RX ADMIN — Medication 1000 UNITS: at 08:06

## 2024-06-27 RX ADMIN — ALBUTEROL SULFATE 2 PUFF: 90 AEROSOL, METERED RESPIRATORY (INHALATION) at 01:06

## 2024-06-27 RX ADMIN — ALLOPURINOL 300 MG: 100 TABLET ORAL at 08:06

## 2024-06-27 RX ADMIN — DEXAMETHASONE SODIUM PHOSPHATE 6 MG: 4 INJECTION, SOLUTION INTRA-ARTICULAR; INTRALESIONAL; INTRAMUSCULAR; INTRAVENOUS; SOFT TISSUE at 08:06

## 2024-06-27 RX ADMIN — IOHEXOL 75 ML: 350 INJECTION, SOLUTION INTRAVENOUS at 02:06

## 2024-06-27 RX ADMIN — OLANZAPINE 5 MG: 2.5 TABLET, FILM COATED ORAL at 09:06

## 2024-06-27 NOTE — ASSESSMENT & PLAN NOTE
Bilateral lobe hazziness L>R on CXR  Coverage for bacterial pneumonia although procal wnl.      Antibiotics (From admission, onward)      Start     Stop Route Frequency Ordered    06/26/24 0900  mupirocin 2 % ointment         07/01/24 0859 Nasl 2 times daily 06/26/24 0753    06/24/24 1900  piperacillin-tazobactam (ZOSYN) 4.5 g in D5W 100 mL IVPB (MB+)         -- IV Every 8 hours (non-standard times) 06/24/24 1355            Microbiology Results (last 7 days)       Procedure Component Value Units Date/Time    Blood culture #1 **CANNOT BE ORDERED STAT** [[id number]] Collected: 06/24/24 1042    Order Status: Completed Specimen: Blood from Peripheral, Hand, Left Updated: 06/26/24 1612     Blood Culture, Routine No Growth to date      No Growth to date      No Growth to date    Blood culture #2 **CANNOT BE ORDERED STAT** [[id number]] Collected: 06/24/24 1036    Order Status: Completed Specimen: Blood from Peripheral, Forearm, Left Updated: 06/26/24 1612     Blood Culture, Routine No Growth to date      No Growth to date      No Growth to date    Blood culture [[id number]]     Order Status: Canceled Specimen: Blood     Blood culture [[id number]]     Order Status: Canceled Specimen: Blood     Influenza A & B by Molecular [[id number]] Collected: 06/24/24 1026    Order Status: Completed Specimen: Nasopharyngeal Swab Updated: 06/24/24 1102     Influenza A, Molecular Negative     Influenza B, Molecular Negative     Flu A & B Source Nasal swab    Group A Strep, Molecular [[id number]] Collected: 06/24/24 1026    Order Status: Completed Specimen: Throat Updated: 06/24/24 1053     Group A Strep, Molecular Negative     Comment: Arcanobacterium haemolyticum and Beta Streptococcus group C   and G will not be detected by this test method.  Please order   Throat Culture ([id number]) if suspected.               Repeat CXR with haziness in left lower lobe but patient rotated to left.  Waiting official radiology read

## 2024-06-27 NOTE — PT/OT/SLP PROGRESS
"Physical Therapy Treatment    Patient Name:  Janes Strange   MRN:  [id number]    Recommendations:     Discharge Recommendations: Moderate Intensity Therapy  Discharge Equipment Recommendations: other (see comments) (continue to assess)  Barriers to discharge: Decreased caregiver support    Assessment:     Janes Strange is a 54 y.o. male admitted with a medical diagnosis of Sepsis due to pneumonia.  He presents with the following impairments/functional limitations: weakness, impaired endurance, impaired self care skills, impaired functional mobility, gait instability, impaired balance, impaired cognition, decreased safety awareness, decreased coordination, decreased lower extremity function.  Today patient tolerated Passive ROM ex on B LE/UE and rolling to sides with minimal assistance. Patient does not wants to sit up at edge of the bed with tends to resist upon attempt.    Rehab Prognosis: Fair; patient would benefit from acute skilled PT services to address these deficits and reach maximum level of function.    Recent Surgery: * No surgery found *      Plan:     During this hospitalization, patient to be seen 5 x/week to address the identified rehab impairments via gait training, therapeutic activities, therapeutic exercises and progress toward the following goals:    Plan of Care Expires:  07/02/24    Subjective     Chief Complaint: none  Patient/Family Comments/goals: "to get and walk"- per Father  Pain/Comfort:  Pain Rating 1: 0/10      Objective:     Communicated with patient and Father prior to session.  Patient found HOB elevated with bed alarm, peripheral IV, telemetry, andrea catheter, oxygen upon PT entry to room.     General Precautions: Standard, fall  Orthopedic Precautions: N/A  Braces: N/A  Respiratory Status: Nasal cannula, flow 3 L/min     Functional Mobility:  Bed Mobility:     Rolling Left:  minimum assistance and cues  Rolling Right: minimum assistance and cues  Pt declined to sit up at edge of the " bed      AM-PAC 6 CLICK MOBILITY  Turning over in bed (including adjusting bedclothes, sheets and blankets)?: 3  Sitting down on and standing up from a chair with arms (e.g., wheelchair, bedside commode, etc.): 2  Moving from lying on back to sitting on the side of the bed?: 3  Moving to and from a bed to a chair (including a wheelchair)?: 2  Need to walk in hospital room?: 2  Climbing 3-5 steps with a railing?: 2  Basic Mobility Total Score: 14       Treatment & Education:  Passive ROM ex on B LE/UE x 10 reps on possible planes at supine position.     Patient left HOB elevated with all lines intact, call button in reach, bed alarm on, nurse notified, and Father present..    GOALS:   Multidisciplinary Problems       Physical Therapy Goals          Problem: Physical Therapy    Goal Priority Disciplines Outcome Goal Variances Interventions   Physical Therapy Goal     PT, PT/OT Progressing     Description: Patient will increase functional independence with mobility by performin. Pt able to participate and tolerate ROM ex on B LE x 10 reps on each to prevent joint stiffness and prevent decline in functions.  2. Educate caregiver/family the proper ROM ex  on B LE with good understanding and carry over.  3. Pt able to participate and perform out of bed activity with minimal assistance.                            Time Tracking:     PT Received On: 24  PT Start Time: 952     PT Stop Time: 1005  PT Total Time (min): 13 min     Billable Minutes: Therapeutic Exercise 13    Treatment Type: Treatment  PT/PTA: PT           2024

## 2024-06-27 NOTE — ASSESSMENT & PLAN NOTE
Due to covid pneumonia/bacterial pneumonia  CXR with increased hazziness in LLQ but laterally rotated  Waiting radiology read.  If unable to wean off oxygen will consider CTA chest and further work up.

## 2024-06-27 NOTE — PROGRESS NOTES
[facility] ([facility])  [facility]  Progress Note    Patient Name: Janes Strange  MRN: [id number]  Patient Class: IP- Inpatient   Admission Date: 6/24/2024  Length of Stay: 2 days  Attending Physician: Angie Bain MD  Primary Care Provider: Luna Kelly NP        Subjective:     Principal Problem:Sepsis due to pneumonia        HPI:  Patient is a 54 year old male with medical history of Down's Syndrome, HLD, TIA and chronic cough who presented to the ED with cough and congestion for 2 weeks.  Patient currently not answering and history obtained from chart review.  Attempted to call father but is currently at eye doctor appointment.        Admitted for sepsis secondary to covid & bacterial pneumonia           Overview/Hospital Course:  PT admitted for sepsis secondary to covid pneumonia.  Hypoxia occurs when sleeping and will wean supplemental oxygen.  CXR pending.  Jones catheter in place due to urinary retention.  U/A negative.  Abdomen distended at times but then relaxes.  KUB pending.  Lactic acid elevated yesterday.  Will get repeat lactic acid this morning.      6/26: PT HD stable on 1 L of supplemental oxygen with oxygen saturation 95%.  Plan to wean supplemental oxygen and discharge within the next 24 hours.        6/27 Yesterday patient was able to be weaned off oxygen but became hypoxic overnight.  PT currently HD stable on 2-3 L nasal cannula.  CXR pending.  Respiratory exam with good air movement and no wheezing.  Will attempt to wean supplemental oxygen today.      Past Medical History:   Diagnosis Date    Down's syndrome     Seizure        History reviewed. No pertinent surgical history.    Review of patient's allergies indicates:   Allergen Reactions    Mayonnaise Blisters    Shellfish containing products      seafood       No current facility-administered medications on file prior to encounter.     Current Outpatient Medications on File Prior to Encounter   Medication Sig     allopurinoL (ZYLOPRIM) 300 MG tablet Take 1 tablet (300 mg total) by mouth once daily.    clopidogreL (PLAVIX) 75 mg tablet Take 1 tablet by mouth once daily    cyanocobalamin 500 MCG tablet Take 500 mcg by mouth once daily.    loratadine (CLARITIN) 10 mg tablet Take 1 tablet (10 mg total) by mouth once daily.    MULTIVITAMIN ORAL Take 1 tablet by mouth once daily. Centrum vitamin    omeprazole (PRILOSEC) 20 MG capsule Take 1 capsule (20 mg total) by mouth once daily.    rosuvastatin (CRESTOR) 10 MG tablet Take 10 mg by mouth once daily.    vitamin D 1000 units Tab Take 185 mg by mouth once daily.    mupirocin (BACTROBAN) 2 % ointment Apply topically 3 (three) times daily.     Family History    None       Tobacco Use    Smoking status: Never    Smokeless tobacco: Never   Substance and Sexual Activity    Alcohol use: No    Drug use: No    Sexual activity: Never     Review of Systems   Reason unable to perform ROS: patient not answering questions.     Objective:     Vital Signs (Most Recent):  Temp: (!) 95.8 °F (35.4 °C) (06/27/24 0834)  Pulse: 87 (06/27/24 1000)  Resp: 20 (06/27/24 0834)  BP: 119/80 (06/27/24 0834)  SpO2: (!) 92 % (06/27/24 0856) Vital Signs (24h Range):  Temp:  [95.8 °F (35.4 °C)-98.8 °F (37.1 °C)] 95.8 °F (35.4 °C)  Pulse:  [] 87  Resp:  [18-20] 20  SpO2:  [86 %-94 %] 92 %  BP: (107-134)/(65-80) 119/80     Weight: 60 kg (132 lb 4.4 oz)  Body mass index is 27.65 kg/m².     Physical Exam  Constitutional:       General: He is not in acute distress.     Comments: Bent over in bed. Nursing staff stating this is his normal comfortable position    HENT:      Head: Normocephalic and atraumatic.   Eyes:      General:         Right eye: No discharge.         Left eye: No discharge.   Cardiovascular:      Rate and Rhythm: Normal rate and regular rhythm.   Pulmonary:      Effort: Pulmonary effort is normal. No respiratory distress.      Breath sounds: Normal breath sounds.   Abdominal:      General:  "Bowel sounds are normal. There is no distension (c).      Palpations: Abdomen is soft.      Tenderness: There is no abdominal tenderness.   Musculoskeletal:         General: No swelling or tenderness.      Cervical back: Neck supple. No tenderness.   Skin:     General: Skin is warm and dry.   Neurological:      General: No focal deficit present.      Mental Status: He is alert. Mental status is at baseline.   Psychiatric:         Mood and Affect: Mood normal.         Behavior: Behavior normal.                Significant Labs: A1C: No results for input(s): "HGBA1C" in the last 4320 hours.  ABGs: No results for input(s): "PH", "PCO2", "HCO3", "POCSATURATED", "BE", "TOTALHB", "COHB", "METHB", "O2HB", "POCFIO2", "PO2" in the last 48 hours.  Bilirubin:   Recent Labs   Lab 06/24/24  1042 06/25/24  0433 06/26/24  0756   BILITOT 0.4 0.5  0.5 0.7     Blood Culture:   No results for input(s): "LABBLOO" in the last 48 hours.    BMP:   Recent Labs   Lab 06/26/24  0756   *      K 3.3*      CO2 23   BUN 16   CREATININE 0.9   CALCIUM 9.0     CBC:   Recent Labs   Lab 06/26/24  0756   WBC 14.75*   HGB 14.7   HCT 43.4        CMP:   Recent Labs   Lab 06/26/24  0756      K 3.3*      CO2 23   *   BUN 16   CREATININE 0.9   CALCIUM 9.0   PROT 6.3   ALBUMIN 2.8*   BILITOT 0.7   ALKPHOS 63   AST 43*   ALT 31   ANIONGAP 11     Cardiac Markers:   No results for input(s): "CKMB", "MYOGLOBIN", "BNP", "TROPISTAT" in the last 48 hours.    Coagulation: No results for input(s): "PT", "INR", "APTT" in the last 48 hours.  Lactic Acid:   Recent Labs   Lab 06/25/24  1512 06/26/24  0756   LACTATE 3.0* 1.6     Lipase: No results for input(s): "LIPASE" in the last 48 hours.  Lipid Panel: No results for input(s): "CHOL", "HDL", "LDLCALC", "TRIG", "CHOLHDL" in the last 48 hours.  Magnesium:   No results for input(s): "MG" in the last 48 hours.    POCT Glucose: No results for input(s): "POCTGLUCOSE" in the last " "48 hours.  Prealbumin: No results for input(s): "PREALBUMIN" in the last 48 hours.  Respiratory Culture: No results for input(s): "GSRESP", "RESPIRATORYC" in the last 48 hours.  Troponin:   No results for input(s): "TROPONINI", "TROPONINIHS" in the last 48 hours.    TSH:   Recent Labs   Lab 02/20/24  0831   TSH 4.622*     Urine Culture: No results for input(s): "LABURIN" in the last 48 hours.  Urine Studies:   No results for input(s): "COLORU", "APPEARANCEUA", "PHUR", "SPECGRAV", "PROTEINUA", "GLUCUA", "KETONESU", "BILIRUBINUA", "OCCULTUA", "NITRITE", "UROBILINOGEN", "LEUKOCYTESUR", "RBCUA", "WBCUA", "BACTERIA", "SQUAMEPITHEL", "HYALINECASTS" in the last 48 hours.    Invalid input(s): "WRIGHTSUR"      Significant Imaging: I have reviewed all pertinent imaging results/findings within the past 24 hours.    Assessment/Plan:      * Sepsis due to pneumonia  This patient does have evidence of infective focus  My overall impression is sepsis.  Source: Respiratory  Antibiotics given-   Antibiotics (72h ago, onward)      Start     Stop Route Frequency Ordered    06/26/24 0900  mupirocin 2 % ointment         07/01/24 0859 Nasl 2 times daily 06/26/24 0753    06/24/24 1900  piperacillin-tazobactam (ZOSYN) 4.5 g in D5W 100 mL IVPB (MB+)         -- IV Every 8 hours (non-standard times) 06/24/24 1355          Latest lactate reviewed-  Recent Labs   Lab 06/26/24  0756   LACTATE 1.6       Organ dysfunction indicated by pulmonary edema on CXR     Fluid challenge 2 L bolus     Post- resuscitation assessment No - Post resuscitation assessment not needed     CXR pulmonary edema, L lobe consolidation   Will Not start Pressors- Levophed for MAP of 65  Source control achieved by: IV zosyn, IV remdesivir  Blood cultures pending/ U/A negative   BNP, procal & troponin wnl     Improving     Hypoxia  Due to covid pneumonia/bacterial pneumonia  CXR with increased hazziness in LLQ but laterally rotated  Waiting radiology read.  If unable to wean " off oxygen will consider CTA chest and further work up.        Abdominal distention  Intermittent  KUB pending  Last bowel movement 2-3 days ago per nursing staff    6/26 Improving.  KUB with Bowel gas pattern.  LBM 6/25.        Urinary retention  Jones catheter in place   Voiding trial today      Pneumonia  Bilateral lobe hazziness L>R on CXR  Coverage for bacterial pneumonia although procal wnl.      Antibiotics (From admission, onward)      Start     Stop Route Frequency Ordered    06/26/24 0900  mupirocin 2 % ointment         07/01/24 0859 Nasl 2 times daily 06/26/24 0753    06/24/24 1900  piperacillin-tazobactam (ZOSYN) 4.5 g in D5W 100 mL IVPB (MB+)         -- IV Every 8 hours (non-standard times) 06/24/24 1355            Microbiology Results (last 7 days)       Procedure Component Value Units Date/Time    Blood culture #1 **CANNOT BE ORDERED STAT** [[id number]] Collected: 06/24/24 1042    Order Status: Completed Specimen: Blood from Peripheral, Hand, Left Updated: 06/26/24 1612     Blood Culture, Routine No Growth to date      No Growth to date      No Growth to date    Blood culture #2 **CANNOT BE ORDERED STAT** [[id number]] Collected: 06/24/24 1036    Order Status: Completed Specimen: Blood from Peripheral, Forearm, Left Updated: 06/26/24 1612     Blood Culture, Routine No Growth to date      No Growth to date      No Growth to date    Blood culture [[id number]]     Order Status: Canceled Specimen: Blood     Blood culture [[id number]]     Order Status: Canceled Specimen: Blood     Influenza A & B by Molecular [[id number]] Collected: 06/24/24 1026    Order Status: Completed Specimen: Nasopharyngeal Swab Updated: 06/24/24 1102     Influenza A, Molecular Negative     Influenza B, Molecular Negative     Flu A & B Source Nasal swab    Group A Strep, Molecular [[id number]] Collected: 06/24/24 1026    Order Status: Completed Specimen: Throat Updated: 06/24/24 1053     Group A Strep, Molecular Negative     Comment:  Arcanobacterium haemolyticum and Beta Streptococcus group C   and G will not be detected by this test method.  Please order   Throat Culture ([id number]) if suspected.               Repeat CXR with haziness in left lower lobe but patient rotated to left.  Waiting official radiology read     Elevated lactic acid level  Lactic acid 4.1 at presentation and now 2.8     6/25 Repeat lactic acid yesterday trending up to 3.1.  Currently only IV fluids.  Repeat lactic acid pending.      6/26 Lactic acid today 1.6.       COVID-19  Patient is identified as Severe COVID-19 based on hypoxemia with O2 saturations <94% on room air or on ambulation   Initiate standard COVID protocols; COVID-19 testing ,Infection Control notification  and isolation- respiratory, contact and droplet per protocol    Diagnostics: CBC, CMP, Ferritin, CRP, and Portable CXR    Management: Maintain oxygen saturations 92-96% via currently on room air and monitor with continuous/intermittent pulse oximetry. , Inhaled bronchodilators as needed for shortness of breath., and Continuous cardiac monitoring.    Advance Care Planning Current advance care plan has not been discussed with patient. Attempted to contact father     Weaning supplemental oxygen.  Lovenox, Remdesivir, dexamethasone, albuterol.      Holding lovenox today for hematuria.          GERD (gastroesophageal reflux disease)  Continue protonix       HLD (hyperlipidemia)  Continue statin       History of TIA (transient ischemic attack)  Continue atorvastatin and plavix      Gout  Continue allopurinol      Down syndrome  Received haloperidol in the ED   Currently calm, awake and answers yes/no        VTE Risk Mitigation (From admission, onward)      None            Discharge Planning   CASTILLO:      Code Status: Full Code   Is the patient medically ready for discharge?:     Reason for patient still in hospital (select all that apply): Patient trending condition  Discharge Plan A: Home with family                   Desire Skaggs PA-C  Department of Hospital Medicine   [facility] [facility] (3rd Fl)

## 2024-06-27 NOTE — PLAN OF CARE
Problem: Physical Therapy  Goal: Physical Therapy Goal  Description: Patient will increase functional independence with mobility by performin. Pt able to participate and tolerate ROM ex on B LE x 10 reps on each to prevent joint stiffness and prevent decline in functions.  2. Educate caregiver/family the proper ROM ex  on B LE with good understanding and carry over.  3. Pt able to participate and perform out of bed activity with minimal assistance.       Outcome: Progressing

## 2024-06-27 NOTE — SUBJECTIVE & OBJECTIVE
Past Medical History:   Diagnosis Date    Down's syndrome     Seizure        History reviewed. No pertinent surgical history.    Review of patient's allergies indicates:   Allergen Reactions    Mayonnaise Blisters    Shellfish containing products      seafood       No current facility-administered medications on file prior to encounter.     Current Outpatient Medications on File Prior to Encounter   Medication Sig    allopurinoL (ZYLOPRIM) 300 MG tablet Take 1 tablet (300 mg total) by mouth once daily.    clopidogreL (PLAVIX) 75 mg tablet Take 1 tablet by mouth once daily    cyanocobalamin 500 MCG tablet Take 500 mcg by mouth once daily.    loratadine (CLARITIN) 10 mg tablet Take 1 tablet (10 mg total) by mouth once daily.    MULTIVITAMIN ORAL Take 1 tablet by mouth once daily. Centrum vitamin    omeprazole (PRILOSEC) 20 MG capsule Take 1 capsule (20 mg total) by mouth once daily.    rosuvastatin (CRESTOR) 10 MG tablet Take 10 mg by mouth once daily.    vitamin D 1000 units Tab Take 185 mg by mouth once daily.    mupirocin (BACTROBAN) 2 % ointment Apply topically 3 (three) times daily.     Family History    None       Tobacco Use    Smoking status: Never    Smokeless tobacco: Never   Substance and Sexual Activity    Alcohol use: No    Drug use: No    Sexual activity: Never     Review of Systems   Reason unable to perform ROS: patient not answering questions.     Objective:     Vital Signs (Most Recent):  Temp: (!) 95.8 °F (35.4 °C) (06/27/24 0834)  Pulse: 87 (06/27/24 1000)  Resp: 20 (06/27/24 0834)  BP: 119/80 (06/27/24 0834)  SpO2: (!) 92 % (06/27/24 0856) Vital Signs (24h Range):  Temp:  [95.8 °F (35.4 °C)-98.8 °F (37.1 °C)] 95.8 °F (35.4 °C)  Pulse:  [] 87  Resp:  [18-20] 20  SpO2:  [86 %-94 %] 92 %  BP: (107-134)/(65-80) 119/80     Weight: 60 kg (132 lb 4.4 oz)  Body mass index is 27.65 kg/m².     Physical Exam  Constitutional:       General: He is not in acute distress.     Comments: Bent over in bed.  "Nursing staff stating this is his normal comfortable position    HENT:      Head: Normocephalic and atraumatic.   Eyes:      General:         Right eye: No discharge.         Left eye: No discharge.   Cardiovascular:      Rate and Rhythm: Normal rate and regular rhythm.   Pulmonary:      Effort: Pulmonary effort is normal. No respiratory distress.      Breath sounds: Normal breath sounds.   Abdominal:      General: Bowel sounds are normal. There is no distension (c).      Palpations: Abdomen is soft.      Tenderness: There is no abdominal tenderness.   Musculoskeletal:         General: No swelling or tenderness.      Cervical back: Neck supple. No tenderness.   Skin:     General: Skin is warm and dry.   Neurological:      General: No focal deficit present.      Mental Status: He is alert. Mental status is at baseline.   Psychiatric:         Mood and Affect: Mood normal.         Behavior: Behavior normal.                Significant Labs: A1C: No results for input(s): "HGBA1C" in the last 4320 hours.  ABGs: No results for input(s): "PH", "PCO2", "HCO3", "POCSATURATED", "BE", "TOTALHB", "COHB", "METHB", "O2HB", "POCFIO2", "PO2" in the last 48 hours.  Bilirubin:   Recent Labs   Lab 06/24/24  1042 06/25/24  0433 06/26/24  0756   BILITOT 0.4 0.5  0.5 0.7     Blood Culture:   No results for input(s): "LABBLOO" in the last 48 hours.    BMP:   Recent Labs   Lab 06/26/24  0756   *      K 3.3*      CO2 23   BUN 16   CREATININE 0.9   CALCIUM 9.0     CBC:   Recent Labs   Lab 06/26/24  0756   WBC 14.75*   HGB 14.7   HCT 43.4        CMP:   Recent Labs   Lab 06/26/24  0756      K 3.3*      CO2 23   *   BUN 16   CREATININE 0.9   CALCIUM 9.0   PROT 6.3   ALBUMIN 2.8*   BILITOT 0.7   ALKPHOS 63   AST 43*   ALT 31   ANIONGAP 11     Cardiac Markers:   No results for input(s): "CKMB", "MYOGLOBIN", "BNP", "TROPISTAT" in the last 48 hours.    Coagulation: No results for input(s): "PT", "INR", " ""APTT" in the last 48 hours.  Lactic Acid:   Recent Labs   Lab 06/25/24  1512 06/26/24  0756   LACTATE 3.0* 1.6     Lipase: No results for input(s): "LIPASE" in the last 48 hours.  Lipid Panel: No results for input(s): "CHOL", "HDL", "LDLCALC", "TRIG", "CHOLHDL" in the last 48 hours.  Magnesium:   No results for input(s): "MG" in the last 48 hours.    POCT Glucose: No results for input(s): "POCTGLUCOSE" in the last 48 hours.  Prealbumin: No results for input(s): "PREALBUMIN" in the last 48 hours.  Respiratory Culture: No results for input(s): "GSRESP", "RESPIRATORYC" in the last 48 hours.  Troponin:   No results for input(s): "TROPONINI", "TROPONINIHS" in the last 48 hours.    TSH:   Recent Labs   Lab 02/20/24  0831   TSH 4.622*     Urine Culture: No results for input(s): "LABURIN" in the last 48 hours.  Urine Studies:   No results for input(s): "COLORU", "APPEARANCEUA", "PHUR", "SPECGRAV", "PROTEINUA", "GLUCUA", "KETONESU", "BILIRUBINUA", "OCCULTUA", "NITRITE", "UROBILINOGEN", "LEUKOCYTESUR", "RBCUA", "WBCUA", "BACTERIA", "SQUAMEPITHEL", "HYALINECASTS" in the last 48 hours.    Invalid input(s): "WRIGHTSUR"      Significant Imaging: I have reviewed all pertinent imaging results/findings within the past 24 hours.  "

## 2024-06-27 NOTE — ASSESSMENT & PLAN NOTE
Intermittent  KUB pending  Last bowel movement 2-3 days ago per nursing staff    6/26 Improving.  KUB with Bowel gas pattern.  LBM 6/25.

## 2024-06-27 NOTE — PLAN OF CARE
Problem: Adult Inpatient Plan of Care  Goal: Plan of Care Review  Outcome: Progressing  Goal: Patient-Specific Goal (Individualized)  Outcome: Progressing  Goal: Absence of Hospital-Acquired Illness or Injury  Outcome: Progressing  Goal: Optimal Comfort and Wellbeing  Outcome: Progressing  Goal: Readiness for Transition of Care  Outcome: Progressing     Problem: Sepsis/Septic Shock  Goal: Optimal Coping  Outcome: Progressing  Goal: Absence of Bleeding  Outcome: Progressing  Goal: Blood Glucose Level Within Targeted Range  Outcome: Progressing  Goal: Absence of Infection Signs and Symptoms  Outcome: Progressing  Goal: Optimal Nutrition Intake  Outcome: Progressing     Problem: Pneumonia  Goal: Fluid Balance  Outcome: Progressing  Goal: Resolution of Infection Signs and Symptoms  Outcome: Progressing  Goal: Effective Oxygenation and Ventilation  Outcome: Progressing     Problem: Skin Injury Risk Increased  Goal: Skin Health and Integrity  Outcome: Progressing     Problem: Infection  Goal: Absence of Infection Signs and Symptoms  Outcome: Progressing     Problem: Fall Injury Risk  Goal: Absence of Fall and Fall-Related Injury  Outcome: Progressing

## 2024-06-28 LAB
ALBUMIN SERPL BCP-MCNC: 2.9 G/DL (ref 3.5–5.2)
ALP SERPL-CCNC: 65 U/L (ref 55–135)
ALT SERPL W/O P-5'-P-CCNC: 30 U/L (ref 10–44)
ANION GAP SERPL CALC-SCNC: 11 MMOL/L (ref 8–16)
AST SERPL-CCNC: 23 U/L (ref 10–40)
BASOPHILS # BLD AUTO: 0.04 K/UL (ref 0–0.2)
BASOPHILS NFR BLD: 0.2 % (ref 0–1.9)
BILIRUB SERPL-MCNC: 0.7 MG/DL (ref 0.1–1)
BNP SERPL-MCNC: 52 PG/ML (ref 0–99)
BUN SERPL-MCNC: 17 MG/DL (ref 6–20)
CALCIUM SERPL-MCNC: 9.2 MG/DL (ref 8.7–10.5)
CHLORIDE SERPL-SCNC: 106 MMOL/L (ref 95–110)
CO2 SERPL-SCNC: 24 MMOL/L (ref 23–29)
CREAT SERPL-MCNC: 0.9 MG/DL (ref 0.5–1.4)
DIFFERENTIAL METHOD BLD: ABNORMAL
EOSINOPHIL # BLD AUTO: 0 K/UL (ref 0–0.5)
EOSINOPHIL NFR BLD: 0 % (ref 0–8)
ERYTHROCYTE [DISTWIDTH] IN BLOOD BY AUTOMATED COUNT: 15.6 % (ref 11.5–14.5)
EST. GFR  (NO RACE VARIABLE): >60 ML/MIN/1.73 M^2
GLUCOSE SERPL-MCNC: 105 MG/DL (ref 70–110)
HCT VFR BLD AUTO: 48 % (ref 40–54)
HGB BLD-MCNC: 16.2 G/DL (ref 14–18)
IMM GRANULOCYTES # BLD AUTO: 0.3 K/UL (ref 0–0.04)
IMM GRANULOCYTES NFR BLD AUTO: 1.3 % (ref 0–0.5)
LACTATE SERPL-SCNC: 1.6 MMOL/L (ref 0.5–2.2)
LYMPHOCYTES # BLD AUTO: 3.7 K/UL (ref 1–4.8)
LYMPHOCYTES NFR BLD: 16 % (ref 18–48)
MCH RBC QN AUTO: 29 PG (ref 27–31)
MCHC RBC AUTO-ENTMCNC: 33.8 G/DL (ref 32–36)
MCV RBC AUTO: 86 FL (ref 82–98)
MONOCYTES # BLD AUTO: 1.4 K/UL (ref 0.3–1)
MONOCYTES NFR BLD: 6.1 % (ref 4–15)
NEUTROPHILS # BLD AUTO: 17.4 K/UL (ref 1.8–7.7)
NEUTROPHILS NFR BLD: 76.4 % (ref 38–73)
NRBC BLD-RTO: 0 /100 WBC
PLATELET # BLD AUTO: 407 K/UL (ref 150–450)
PMV BLD AUTO: 10.1 FL (ref 9.2–12.9)
POTASSIUM SERPL-SCNC: 3.3 MMOL/L (ref 3.5–5.1)
PROCALCITONIN SERPL IA-MCNC: 0.04 NG/ML
PROT SERPL-MCNC: 6.9 G/DL (ref 6–8.4)
RBC # BLD AUTO: 5.58 M/UL (ref 4.6–6.2)
SODIUM SERPL-SCNC: 141 MMOL/L (ref 136–145)
WBC # BLD AUTO: 22.81 K/UL (ref 3.9–12.7)

## 2024-06-28 PROCEDURE — 83605 ASSAY OF LACTIC ACID: CPT | Performed by: PHYSICIAN ASSISTANT

## 2024-06-28 PROCEDURE — 27000207 HC ISOLATION

## 2024-06-28 PROCEDURE — 94640 AIRWAY INHALATION TREATMENT: CPT

## 2024-06-28 PROCEDURE — 97530 THERAPEUTIC ACTIVITIES: CPT

## 2024-06-28 PROCEDURE — 84145 PROCALCITONIN (PCT): CPT | Performed by: PHYSICIAN ASSISTANT

## 2024-06-28 PROCEDURE — 25000003 PHARM REV CODE 250: Performed by: PHYSICIAN ASSISTANT

## 2024-06-28 PROCEDURE — 99900035 HC TECH TIME PER 15 MIN (STAT)

## 2024-06-28 PROCEDURE — 63600175 PHARM REV CODE 636 W HCPCS: Performed by: PHYSICIAN ASSISTANT

## 2024-06-28 PROCEDURE — 85025 COMPLETE CBC W/AUTO DIFF WBC: CPT | Performed by: PHYSICIAN ASSISTANT

## 2024-06-28 PROCEDURE — 92610 EVALUATE SWALLOWING FUNCTION: CPT

## 2024-06-28 PROCEDURE — 63600175 PHARM REV CODE 636 W HCPCS: Performed by: INTERNAL MEDICINE

## 2024-06-28 PROCEDURE — 94799 UNLISTED PULMONARY SVC/PX: CPT

## 2024-06-28 PROCEDURE — 63600175 PHARM REV CODE 636 W HCPCS: Mod: JZ,TB | Performed by: INTERNAL MEDICINE

## 2024-06-28 PROCEDURE — 25000003 PHARM REV CODE 250: Performed by: INTERNAL MEDICINE

## 2024-06-28 PROCEDURE — 97110 THERAPEUTIC EXERCISES: CPT

## 2024-06-28 PROCEDURE — 99233 SBSQ HOSP IP/OBS HIGH 50: CPT | Mod: ,,, | Performed by: PHYSICIAN ASSISTANT

## 2024-06-28 PROCEDURE — C9113 INJ PANTOPRAZOLE SODIUM, VIA: HCPCS | Performed by: PHYSICIAN ASSISTANT

## 2024-06-28 PROCEDURE — 27000221 HC OXYGEN, UP TO 24 HOURS

## 2024-06-28 PROCEDURE — 94761 N-INVAS EAR/PLS OXIMETRY MLT: CPT

## 2024-06-28 PROCEDURE — 11000001 HC ACUTE MED/SURG PRIVATE ROOM

## 2024-06-28 PROCEDURE — 36415 COLL VENOUS BLD VENIPUNCTURE: CPT | Performed by: PHYSICIAN ASSISTANT

## 2024-06-28 PROCEDURE — 83880 ASSAY OF NATRIURETIC PEPTIDE: CPT | Performed by: PHYSICIAN ASSISTANT

## 2024-06-28 PROCEDURE — 80053 COMPREHEN METABOLIC PANEL: CPT | Performed by: PHYSICIAN ASSISTANT

## 2024-06-28 PROCEDURE — 25000003 PHARM REV CODE 250: Performed by: FAMILY MEDICINE

## 2024-06-28 RX ORDER — SODIUM CHLORIDE 9 MG/ML
INJECTION, SOLUTION INTRAVENOUS CONTINUOUS
Status: DISCONTINUED | OUTPATIENT
Start: 2024-06-28 | End: 2024-07-01 | Stop reason: HOSPADM

## 2024-06-28 RX ORDER — GUAIFENESIN 600 MG/1
600 TABLET, EXTENDED RELEASE ORAL 2 TIMES DAILY
Status: DISCONTINUED | OUTPATIENT
Start: 2024-06-28 | End: 2024-07-01 | Stop reason: HOSPADM

## 2024-06-28 RX ADMIN — ALLOPURINOL 300 MG: 100 TABLET ORAL at 09:06

## 2024-06-28 RX ADMIN — DEXAMETHASONE SODIUM PHOSPHATE 6 MG: 4 INJECTION, SOLUTION INTRA-ARTICULAR; INTRALESIONAL; INTRAMUSCULAR; INTRAVENOUS; SOFT TISSUE at 09:06

## 2024-06-28 RX ADMIN — PIPERACILLIN SODIUM AND TAZOBACTAM SODIUM 4.5 G: 4; .5 INJECTION, POWDER, LYOPHILIZED, FOR SOLUTION INTRAVENOUS at 12:06

## 2024-06-28 RX ADMIN — PIPERACILLIN SODIUM AND TAZOBACTAM SODIUM 4.5 G: 4; .5 INJECTION, POWDER, LYOPHILIZED, FOR SOLUTION INTRAVENOUS at 03:06

## 2024-06-28 RX ADMIN — TAMSULOSIN HYDROCHLORIDE 0.4 MG: 0.4 CAPSULE ORAL at 08:06

## 2024-06-28 RX ADMIN — ATORVASTATIN CALCIUM 40 MG: 20 TABLET, FILM COATED ORAL at 08:06

## 2024-06-28 RX ADMIN — GUAIFENESIN 600 MG: 600 TABLET, EXTENDED RELEASE ORAL at 08:06

## 2024-06-28 RX ADMIN — Medication 1000 UNITS: at 09:06

## 2024-06-28 RX ADMIN — SODIUM CHLORIDE: 9 INJECTION, SOLUTION INTRAVENOUS at 10:06

## 2024-06-28 RX ADMIN — MUPIROCIN: 20 OINTMENT TOPICAL at 11:06

## 2024-06-28 RX ADMIN — ALBUTEROL SULFATE 2 PUFF: 90 AEROSOL, METERED RESPIRATORY (INHALATION) at 07:06

## 2024-06-28 RX ADMIN — SODIUM CHLORIDE: 9 INJECTION, SOLUTION INTRAVENOUS at 09:06

## 2024-06-28 RX ADMIN — PANTOPRAZOLE SODIUM 40 MG: 40 INJECTION, POWDER, FOR SOLUTION INTRAVENOUS at 09:06

## 2024-06-28 RX ADMIN — VANCOMYCIN HYDROCHLORIDE 750 MG: 750 INJECTION, POWDER, LYOPHILIZED, FOR SOLUTION INTRAVENOUS at 10:06

## 2024-06-28 RX ADMIN — VANCOMYCIN HYDROCHLORIDE 1500 MG: 1.5 INJECTION, POWDER, LYOPHILIZED, FOR SOLUTION INTRAVENOUS at 10:06

## 2024-06-28 RX ADMIN — REMDESIVIR 100 MG: 100 INJECTION, POWDER, LYOPHILIZED, FOR SOLUTION INTRAVENOUS at 11:06

## 2024-06-28 RX ADMIN — CLOPIDOGREL BISULFATE 75 MG: 75 TABLET ORAL at 09:06

## 2024-06-28 RX ADMIN — POTASSIUM BICARBONATE 20 MEQ: 391 TABLET, EFFERVESCENT ORAL at 04:06

## 2024-06-28 RX ADMIN — MUPIROCIN: 20 OINTMENT TOPICAL at 08:06

## 2024-06-28 RX ADMIN — ALBUTEROL SULFATE 2 PUFF: 90 AEROSOL, METERED RESPIRATORY (INHALATION) at 02:06

## 2024-06-28 RX ADMIN — PIPERACILLIN SODIUM AND TAZOBACTAM SODIUM 4.5 G: 4; .5 INJECTION, POWDER, LYOPHILIZED, FOR SOLUTION INTRAVENOUS at 07:06

## 2024-06-28 NOTE — ASSESSMENT & PLAN NOTE
Due to covid pneumonia/bacterial pneumonia  CXR with increased hazziness in LLQ but laterally rotated  Waiting radiology read.  If unable to wean off oxygen will consider CTA chest and further work up.      6/28: No PE on CTA chest.  Persistent left lobe PNA.  Added vancomycin to IV zosyn for pneumonia treatment.  Pulmonology notified.

## 2024-06-28 NOTE — PROGRESS NOTES
[facility] ([facility])  [facility] Medicine  Progress Note    Patient Name: Janes Strange  MRN: [id number]  Patient Class: IP- Inpatient   Admission Date: 6/24/2024  Length of Stay: 3 days  Attending Physician: Angie Bain MD  Primary Care Provider: Luna Kelly NP        Subjective:     Principal Problem:Sepsis due to pneumonia        HPI:  Patient is a 54 year old male with medical history of Down's Syndrome, HLD, TIA and chronic cough who presented to the ED with cough and congestion for 2 weeks.  Patient currently not answering and history obtained from chart review.  Attempted to call father but is currently at eye doctor appointment.        Admitted for sepsis secondary to covid & bacterial pneumonia           Overview/Hospital Course:  PT admitted for sepsis secondary to covid pneumonia.  Hypoxia occurs when sleeping and will wean supplemental oxygen.  CXR pending.  Jones catheter in place due to urinary retention.  U/A negative.  Abdomen distended at times but then relaxes.  KUB pending.  Lactic acid elevated yesterday.  Will get repeat lactic acid this morning.      6/26: PT HD stable on 1 L of supplemental oxygen with oxygen saturation 95%.  Plan to wean supplemental oxygen and discharge within the next 24 hours.        6/27 Yesterday patient was able to be weaned off oxygen but became hypoxic overnight.  PT currently HD stable on 2-3 L nasal cannula.  CXR pending.  Respiratory exam with good air movement and no wheezing.  Will attempt to wean supplemental oxygen today.      6/28 Worsening hypoxia and now on 6 L.  Added vancomycin to IV zosyn.  Patient's last day of remdesivir for covid is today.  Discussed with pulmonology and will likely round on patient tomorrow.  Discussed plan of care with dad.      Past Medical History:   Diagnosis Date    Down's syndrome     Seizure        History reviewed. No pertinent surgical history.    Review of patient's allergies indicates:   Allergen Reactions     Mayonnaise Blisters    Shellfish containing products      seafood       No current facility-administered medications on file prior to encounter.     Current Outpatient Medications on File Prior to Encounter   Medication Sig    allopurinoL (ZYLOPRIM) 300 MG tablet Take 1 tablet (300 mg total) by mouth once daily.    clopidogreL (PLAVIX) 75 mg tablet Take 1 tablet by mouth once daily    cyanocobalamin 500 MCG tablet Take 500 mcg by mouth once daily.    loratadine (CLARITIN) 10 mg tablet Take 1 tablet (10 mg total) by mouth once daily.    MULTIVITAMIN ORAL Take 1 tablet by mouth once daily. Centrum vitamin    omeprazole (PRILOSEC) 20 MG capsule Take 1 capsule (20 mg total) by mouth once daily.    rosuvastatin (CRESTOR) 10 MG tablet Take 10 mg by mouth once daily.    vitamin D 1000 units Tab Take 185 mg by mouth once daily.    mupirocin (BACTROBAN) 2 % ointment Apply topically 3 (three) times daily.     Family History    None       Tobacco Use    Smoking status: Never    Smokeless tobacco: Never   Substance and Sexual Activity    Alcohol use: No    Drug use: No    Sexual activity: Never     Review of Systems   Reason unable to perform ROS: patient not answering questions.     Objective:     Vital Signs (Most Recent):  Temp: 96 °F (35.6 °C) (06/28/24 1135)  Pulse: 98 (06/28/24 1452)  Resp: 18 (06/28/24 1452)  BP: 119/83 (06/28/24 1135)  SpO2: (!) 91 % (06/28/24 1452) Vital Signs (24h Range):  Temp:  [96 °F (35.6 °C)-98.1 °F (36.7 °C)] 96 °F (35.6 °C)  Pulse:  [] 98  Resp:  [16-22] 18  SpO2:  [88 %-93 %] 91 %  BP: ()/(58-83) 119/83     Weight: 60 kg (132 lb 4.4 oz)  Body mass index is 27.65 kg/m².     Physical Exam  Constitutional:       General: He is not in acute distress.     Comments: Bent over in bed. Nursing staff stating this is his normal comfortable position    HENT:      Head: Normocephalic and atraumatic.   Eyes:      General:         Right eye: No discharge.         Left eye: No discharge.  "  Cardiovascular:      Rate and Rhythm: Normal rate and regular rhythm.   Pulmonary:      Effort: Pulmonary effort is normal. No respiratory distress.      Breath sounds: Normal breath sounds.   Abdominal:      General: Bowel sounds are normal. There is no distension (c).      Palpations: Abdomen is soft.      Tenderness: There is no abdominal tenderness.   Musculoskeletal:         General: No swelling or tenderness.      Cervical back: Neck supple. No tenderness.   Skin:     General: Skin is warm and dry.   Neurological:      General: No focal deficit present.      Mental Status: He is alert. Mental status is at baseline.   Psychiatric:         Mood and Affect: Mood normal.         Behavior: Behavior normal.                Significant Labs: A1C: No results for input(s): "HGBA1C" in the last 4320 hours.  ABGs: No results for input(s): "PH", "PCO2", "HCO3", "POCSATURATED", "BE", "TOTALHB", "COHB", "METHB", "O2HB", "POCFIO2", "PO2" in the last 48 hours.  Bilirubin:   Recent Labs   Lab 06/24/24  1042 06/25/24  0433 06/26/24  0756 06/27/24  1116 06/28/24  0824   BILITOT 0.4 0.5  0.5 0.7 0.6 0.7     Blood Culture:   No results for input(s): "LABBLOO" in the last 48 hours.    BMP:   Recent Labs   Lab 06/27/24  1116 06/28/24  0824   * 105    141   K 3.4* 3.3*    106   CO2 23 24   BUN 19 17   CREATININE 1.0 0.9   CALCIUM 8.7 9.2   MG 1.8  --      CBC:   Recent Labs   Lab 06/27/24  1116 06/28/24  0824   WBC 13.83* 22.81*   HGB 16.5 16.2   HCT 48.2 48.0    407     CMP:   Recent Labs   Lab 06/27/24  1116 06/28/24  0824    141   K 3.4* 3.3*    106   CO2 23 24   * 105   BUN 19 17   CREATININE 1.0 0.9   CALCIUM 8.7 9.2   PROT 6.3 6.9   ALBUMIN 2.7* 2.9*   BILITOT 0.6 0.7   ALKPHOS 63 65   AST 35 23   ALT 35 30   ANIONGAP 12 11     Cardiac Markers:   Recent Labs   Lab 06/28/24  0824   BNP 52       Coagulation: No results for input(s): "PT", "INR", "APTT" in the last 48 hours.  Lactic " "Acid:   Recent Labs   Lab 06/27/24  1116 06/28/24  0824   LACTATE 2.9* 1.6     Lipase: No results for input(s): "LIPASE" in the last 48 hours.  Lipid Panel: No results for input(s): "CHOL", "HDL", "LDLCALC", "TRIG", "CHOLHDL" in the last 48 hours.  Magnesium:   Recent Labs   Lab 06/27/24  1116   MG 1.8       POCT Glucose: No results for input(s): "POCTGLUCOSE" in the last 48 hours.  Prealbumin: No results for input(s): "PREALBUMIN" in the last 48 hours.  Respiratory Culture: No results for input(s): "GSRESP", "RESPIRATORYC" in the last 48 hours.  Troponin:   No results for input(s): "TROPONINI", "TROPONINIHS" in the last 48 hours.    TSH:   Recent Labs   Lab 02/20/24  0831   TSH 4.622*     Urine Culture: No results for input(s): "LABURIN" in the last 48 hours.  Urine Studies:   No results for input(s): "COLORU", "APPEARANCEUA", "PHUR", "SPECGRAV", "PROTEINUA", "GLUCUA", "KETONESU", "BILIRUBINUA", "OCCULTUA", "NITRITE", "UROBILINOGEN", "LEUKOCYTESUR", "RBCUA", "WBCUA", "BACTERIA", "SQUAMEPITHEL", "HYALINECASTS" in the last 48 hours.    Invalid input(s): "WRIGHTSUR"      Significant Imaging: I have reviewed all pertinent imaging results/findings within the past 24 hours.    Assessment/Plan:      * Sepsis due to pneumonia  This patient does have evidence of infective focus  My overall impression is sepsis.  Source: Respiratory  Antibiotics given-   Antibiotics (72h ago, onward)      Start     Stop Route Frequency Ordered    06/28/24 2200  vancomycin 750 mg in D5W 250 mL IVPB (Vial-Mate)         -- IV Every 12 hours (non-standard times) 06/28/24 1011    06/28/24 0905  vancomycin - pharmacy to dose  (vancomycin IVPB (PEDS and ADULTS))        Placed in "And" Linked Group    -- IV pharmacy to manage frequency 06/28/24 0805    06/26/24 0900  mupirocin 2 % ointment         07/01/24 0859 Nasl 2 times daily 06/26/24 0753    06/24/24 1900  piperacillin-tazobactam (ZOSYN) 4.5 g in D5W 100 mL IVPB (MB+)         -- IV Every 8 " "hours (non-standard times) 06/24/24 1355          Latest lactate reviewed-  Recent Labs   Lab 06/28/24  0824   LACTATE 1.6       Organ dysfunction indicated by pulmonary edema on CXR     Fluid challenge 2 L bolus     Post- resuscitation assessment No - Post resuscitation assessment not needed     CXR pulmonary edema, L lobe consolidation   Will Not start Pressors- Levophed for MAP of 65  Source control achieved by: IV zosyn, IV remdesivir  Blood cultures pending/ U/A negative   BNP, procal & troponin wnl     Improving     Hypoxia  Due to covid pneumonia/bacterial pneumonia  CXR with increased hazziness in LLQ but laterally rotated  Waiting radiology read.  If unable to wean off oxygen will consider CTA chest and further work up.      6/28: No PE on CTA chest.  Persistent left lobe PNA.  Added vancomycin to IV zosyn for pneumonia treatment.  Pulmonology notified.        Abdominal distention  Intermittent  KUB pending  Last bowel movement 2-3 days ago per nursing staff    6/26 Improving.  KUB with Bowel gas pattern.  LBM 6/25.      resolved      Urinary retention  Jones catheter in place   Voiding trial today      Pneumonia  Bilateral lobe hazziness L>R on CXR  Coverage for bacterial pneumonia although procal wnl.      Antibiotics (From admission, onward)      Start     Stop Route Frequency Ordered    06/28/24 2200  vancomycin 750 mg in D5W 250 mL IVPB (Vial-Mate)         -- IV Every 12 hours (non-standard times) 06/28/24 1011    06/28/24 0905  vancomycin - pharmacy to dose  (vancomycin IVPB (PEDS and ADULTS))        Placed in "And" Linked Group    -- IV pharmacy to manage frequency 06/28/24 0805    06/26/24 0900  mupirocin 2 % ointment         07/01/24 0859 Nasl 2 times daily 06/26/24 0753    06/24/24 1900  piperacillin-tazobactam (ZOSYN) 4.5 g in D5W 100 mL IVPB (MB+)         -- IV Every 8 hours (non-standard times) 06/24/24 1355            Microbiology Results (last 7 days)       Procedure Component Value Units " Date/Time    Culture, Respiratory with Gram Stain [[id number]]     Order Status: No result Specimen: Respiratory     Blood culture #1 **CANNOT BE ORDERED STAT** [[id number]] Collected: 06/24/24 1042    Order Status: Completed Specimen: Blood from Peripheral, Hand, Left Updated: 06/27/24 1612     Blood Culture, Routine No Growth to date      No Growth to date      No Growth to date      No Growth to date    Blood culture #2 **CANNOT BE ORDERED STAT** [[id number]] Collected: 06/24/24 1036    Order Status: Completed Specimen: Blood from Peripheral, Forearm, Left Updated: 06/27/24 1612     Blood Culture, Routine No Growth to date      No Growth to date      No Growth to date      No Growth to date    Blood culture [[id number]]     Order Status: Canceled Specimen: Blood     Blood culture [[id number]]     Order Status: Canceled Specimen: Blood     Influenza A & B by Molecular [[id number]] Collected: 06/24/24 1026    Order Status: Completed Specimen: Nasopharyngeal Swab Updated: 06/24/24 1102     Influenza A, Molecular Negative     Influenza B, Molecular Negative     Flu A & B Source Nasal swab    Group A Strep, Molecular [[id number]] Collected: 06/24/24 1026    Order Status: Completed Specimen: Throat Updated: 06/24/24 1053     Group A Strep, Molecular Negative     Comment: Arcanobacterium haemolyticum and Beta Streptococcus group C   and G will not be detected by this test method.  Please order   Throat Culture ([id number]) if suspected.               Continued left lobe consolidation     Elevated lactic acid level  Lactic acid 4.1 at presentation and now 2.8     6/25 Repeat lactic acid yesterday trending up to 3.1.  Currently only IV fluids.  Repeat lactic acid pending.      6/26 Lactic acid today 1.6.       COVID-19  Patient is identified as Severe COVID-19 based on hypoxemia with O2 saturations <94% on room air or on ambulation   Initiate standard COVID protocols; COVID-19 testing ,Infection Control notification  and  isolation- respiratory, contact and droplet per protocol    Diagnostics: CBC, CMP, Ferritin, CRP, and Portable CXR    Management: Maintain oxygen saturations 92-96% via currently on room air and monitor with continuous/intermittent pulse oximetry. , Inhaled bronchodilators as needed for shortness of breath., and Continuous cardiac monitoring.    Advance Care Planning Current advance care plan has not been discussed with patient. Attempted to contact father     Increased supplemental oxygen, Remdesivir, dexamethasone, albuterol.          GERD (gastroesophageal reflux disease)  Continue protonix       HLD (hyperlipidemia)  Continue statin       History of TIA (transient ischemic attack)  Continue atorvastatin and plavix      Gout  Continue allopurinol      Down syndrome  Received haloperidol in the ED   Currently calm, awake and answers yes/no        VTE Risk Mitigation (From admission, onward)      None            Discharge Planning   CASTILLO:      Code Status: Full Code   Is the patient medically ready for discharge?:     Reason for patient still in hospital (select all that apply): Patient trending condition  Discharge Plan A: Home with family                  Desire Skaggs PA-C  Department of Hospital Medicine   [place] [facility] ([facility])

## 2024-06-28 NOTE — ASSESSMENT & PLAN NOTE
Patient is identified as Severe COVID-19 based on hypoxemia with O2 saturations <94% on room air or on ambulation   Initiate standard COVID protocols; COVID-19 testing ,Infection Control notification  and isolation- respiratory, contact and droplet per protocol    Diagnostics: CBC, CMP, Ferritin, CRP, and Portable CXR    Management: Maintain oxygen saturations 92-96% via currently on room air and monitor with continuous/intermittent pulse oximetry. , Inhaled bronchodilators as needed for shortness of breath., and Continuous cardiac monitoring.    Advance Care Planning  Current advance care plan has not been discussed with patient. Attempted to contact father     Increased supplemental oxygen, Remdesivir, dexamethasone, albuterol.

## 2024-06-28 NOTE — PT/OT/SLP PROGRESS
Physical Therapy Treatment    Patient Name:  Janes Strange   MRN:  [id number]    Recommendations:     Discharge Recommendations: Moderate Intensity Therapy  Discharge Equipment Recommendations: other (see comments) (continue to assess)  Barriers to discharge: Decreased caregiver support    Assessment:     Janes Strange is a 54 y.o. male admitted with a medical diagnosis of Sepsis due to pneumonia.  He presents with the following impairments/functional limitations: weakness, impaired endurance, impaired self care skills, impaired functional mobility, gait instability, impaired cognition, impaired balance, decreased safety awareness, decreased lower extremity function Patient tolerated sitting up at edge of the bed and transfers at bedside chair with minimal assistance and cues using step transfers with RW and O2 supplement.  SPO2 based line 94% down to 85%. No sign of SOB.    Rehab Prognosis: Fair; patient would benefit from acute skilled PT services to address these deficits and reach maximum level of function.    Recent Surgery: * No surgery found *      Plan:     During this hospitalization, patient to be seen 5 x/week to address the identified rehab impairments via gait training, therapeutic activities, therapeutic exercises and progress toward the following goals:    Plan of Care Expires:  07/02/24    Subjective     Chief Complaint: none   Patient/Family Comments/goals: none stated  Pain/Comfort:  Pain Rating 1: 0/10      Objective:     Communicated with patient prior to session.  Patient found HOB elevated with bed alarm, peripheral IV, telemetry, andrea catheter, oxygen upon PT entry to room.     General Precautions: Standard, airborne, contact, droplet, fall  Orthopedic Precautions: N/A  Braces: N/A  Respiratory Status: Nasal cannula, flow 6 L/min     Functional Mobility:  Bed Mobility:     Rolling Left:  contact guard assistance and cues   Rolling Right: contact guard assistance  Scooting: contact guard  assistance  Supine to Sit: minimum assistance  Sit to Supine: minimum assistance  Transfers:     Sit to Stand:  minimum assistance with rolling walker  Bed to Chair: minimum assistance with  rolling walker  using  Step Transfer  Gait: Minimal assistance and cues x ~5 feet using RW. Dec foot floor clearance and dec nita  Balance: Sitting; standby assistance; Standing with RW: contact guard assistance      AM-PAC 6 CLICK MOBILITY  Turning over in bed (including adjusting bedclothes, sheets and blankets)?: 3  Sitting down on and standing up from a chair with arms (e.g., wheelchair, bedside commode, etc.): 2  Moving from lying on back to sitting on the side of the bed?: 3  Moving to and from a bed to a chair (including a wheelchair)?: 2  Need to walk in hospital room?: 2  Climbing 3-5 steps with a railing?: 2  Basic Mobility Total Score: 14       Treatment & Education:  PROM ex on B LE/UE x 10 reps on possible plane at supine; body sequence on bed mobility, sitting activity and step  transfer trng and gait trng using RW x ~5 feet with minimal assistance cues. Monitored O2 SAT.    Patient left HOB elevated with all lines intact, call button in reach, bed alarm on, and nurse notified..    GOALS:   Multidisciplinary Problems       Physical Therapy Goals          Problem: Physical Therapy    Goal Priority Disciplines Outcome Goal Variances Interventions   Physical Therapy Goal     PT, PT/OT Progressing     Description: Patient will increase functional independence with mobility by performin. Pt able to participate and tolerate ROM ex on B LE x 10 reps on each to prevent joint stiffness and prevent decline in functions.  2. Educate caregiver/family the proper ROM ex  on B LE with good understanding and carry over.  3. Pt able to participate and perform out of bed activity with minimal assistance.                            Time Tracking:     PT Received On: 24  PT Start Time: 1035     PT Stop Time: 1100  PT  Total Time (min): 25 min     Billable Minutes: Therapeutic Activity 25    Treatment Type: Treatment  PT/PTA: PT           06/28/2024

## 2024-06-28 NOTE — SUBJECTIVE & OBJECTIVE
Past Medical History:   Diagnosis Date    Down's syndrome     Seizure        History reviewed. No pertinent surgical history.    Review of patient's allergies indicates:   Allergen Reactions    Mayonnaise Blisters    Shellfish containing products      seafood       No current facility-administered medications on file prior to encounter.     Current Outpatient Medications on File Prior to Encounter   Medication Sig    allopurinoL (ZYLOPRIM) 300 MG tablet Take 1 tablet (300 mg total) by mouth once daily.    clopidogreL (PLAVIX) 75 mg tablet Take 1 tablet by mouth once daily    cyanocobalamin 500 MCG tablet Take 500 mcg by mouth once daily.    loratadine (CLARITIN) 10 mg tablet Take 1 tablet (10 mg total) by mouth once daily.    MULTIVITAMIN ORAL Take 1 tablet by mouth once daily. Centrum vitamin    omeprazole (PRILOSEC) 20 MG capsule Take 1 capsule (20 mg total) by mouth once daily.    rosuvastatin (CRESTOR) 10 MG tablet Take 10 mg by mouth once daily.    vitamin D 1000 units Tab Take 185 mg by mouth once daily.    mupirocin (BACTROBAN) 2 % ointment Apply topically 3 (three) times daily.     Family History    None       Tobacco Use    Smoking status: Never    Smokeless tobacco: Never   Substance and Sexual Activity    Alcohol use: No    Drug use: No    Sexual activity: Never     Review of Systems   Reason unable to perform ROS: patient not answering questions.     Objective:     Vital Signs (Most Recent):  Temp: 96 °F (35.6 °C) (06/28/24 1135)  Pulse: 98 (06/28/24 1452)  Resp: 18 (06/28/24 1452)  BP: 119/83 (06/28/24 1135)  SpO2: (!) 91 % (06/28/24 1452) Vital Signs (24h Range):  Temp:  [96 °F (35.6 °C)-98.1 °F (36.7 °C)] 96 °F (35.6 °C)  Pulse:  [] 98  Resp:  [16-22] 18  SpO2:  [88 %-93 %] 91 %  BP: ()/(58-83) 119/83     Weight: 60 kg (132 lb 4.4 oz)  Body mass index is 27.65 kg/m².     Physical Exam  Constitutional:       General: He is not in acute distress.     Comments: Bent over in bed. Nursing staff  "stating this is his normal comfortable position    HENT:      Head: Normocephalic and atraumatic.   Eyes:      General:         Right eye: No discharge.         Left eye: No discharge.   Cardiovascular:      Rate and Rhythm: Normal rate and regular rhythm.   Pulmonary:      Effort: Pulmonary effort is normal. No respiratory distress.      Breath sounds: Normal breath sounds.   Abdominal:      General: Bowel sounds are normal. There is no distension (c).      Palpations: Abdomen is soft.      Tenderness: There is no abdominal tenderness.   Musculoskeletal:         General: No swelling or tenderness.      Cervical back: Neck supple. No tenderness.   Skin:     General: Skin is warm and dry.   Neurological:      General: No focal deficit present.      Mental Status: He is alert. Mental status is at baseline.   Psychiatric:         Mood and Affect: Mood normal.         Behavior: Behavior normal.                Significant Labs: A1C: No results for input(s): "HGBA1C" in the last 4320 hours.  ABGs: No results for input(s): "PH", "PCO2", "HCO3", "POCSATURATED", "BE", "TOTALHB", "COHB", "METHB", "O2HB", "POCFIO2", "PO2" in the last 48 hours.  Bilirubin:   Recent Labs   Lab 06/24/24  1042 06/25/24  0433 06/26/24  0756 06/27/24  1116 06/28/24  0824   BILITOT 0.4 0.5  0.5 0.7 0.6 0.7     Blood Culture:   No results for input(s): "LABBLOO" in the last 48 hours.    BMP:   Recent Labs   Lab 06/27/24  1116 06/28/24  0824   * 105    141   K 3.4* 3.3*    106   CO2 23 24   BUN 19 17   CREATININE 1.0 0.9   CALCIUM 8.7 9.2   MG 1.8  --      CBC:   Recent Labs   Lab 06/27/24  1116 06/28/24  0824   WBC 13.83* 22.81*   HGB 16.5 16.2   HCT 48.2 48.0    407     CMP:   Recent Labs   Lab 06/27/24  1116 06/28/24  0824    141   K 3.4* 3.3*    106   CO2 23 24   * 105   BUN 19 17   CREATININE 1.0 0.9   CALCIUM 8.7 9.2   PROT 6.3 6.9   ALBUMIN 2.7* 2.9*   BILITOT 0.6 0.7   ALKPHOS 63 65   AST 35 23 " "  ALT 35 30   ANIONGAP 12 11     Cardiac Markers:   Recent Labs   Lab 06/28/24  0824   BNP 52       Coagulation: No results for input(s): "PT", "INR", "APTT" in the last 48 hours.  Lactic Acid:   Recent Labs   Lab 06/27/24  1116 06/28/24  0824   LACTATE 2.9* 1.6     Lipase: No results for input(s): "LIPASE" in the last 48 hours.  Lipid Panel: No results for input(s): "CHOL", "HDL", "LDLCALC", "TRIG", "CHOLHDL" in the last 48 hours.  Magnesium:   Recent Labs   Lab 06/27/24  1116   MG 1.8       POCT Glucose: No results for input(s): "POCTGLUCOSE" in the last 48 hours.  Prealbumin: No results for input(s): "PREALBUMIN" in the last 48 hours.  Respiratory Culture: No results for input(s): "GSRESP", "RESPIRATORYC" in the last 48 hours.  Troponin:   No results for input(s): "TROPONINI", "TROPONINIHS" in the last 48 hours.    TSH:   Recent Labs   Lab 02/20/24  0831   TSH 4.622*     Urine Culture: No results for input(s): "LABURIN" in the last 48 hours.  Urine Studies:   No results for input(s): "COLORU", "APPEARANCEUA", "PHUR", "SPECGRAV", "PROTEINUA", "GLUCUA", "KETONESU", "BILIRUBINUA", "OCCULTUA", "NITRITE", "UROBILINOGEN", "LEUKOCYTESUR", "RBCUA", "WBCUA", "BACTERIA", "SQUAMEPITHEL", "HYALINECASTS" in the last 48 hours.    Invalid input(s): "WRIGHTSUR"      Significant Imaging: I have reviewed all pertinent imaging results/findings within the past 24 hours.  "

## 2024-06-28 NOTE — PT/OT/SLP EVAL
Speech Language Pathology Evaluation  Bedside Swallow    Patient Name:  Janes Strange   MRN:  [id number]  Admitting Diagnosis: Sepsis due to pneumonia    Recommendations:                 General Recommendations:   SLP to f/u x5/week for dysphagia management  Diet recommendations:  Soft & Bite Sized Diet - IDDSI Level 6, Thin liquids - IDDSI Level 0. PO meds 1 at a time w/ liquids.  Aspiration Precautions: Small bites/sips, 1 bite/sip at a time, Alternate solids/liquids, HOB upright, check for oral residue, slow rate of intake, & standard aspiration precautions.  Control risk for aspiration PNA via (a) oral hygiene q4h, (b) increase physical mobility as feasible, & (c) HOB upright as tolerated  General Precautions: Standard, airborne, contact, droplet, fall, aspiration  Communication strategies:   n/a    Assessment:     Janes Strange is a 54 M who presents w/ oral dysphagia and increased risk for aspiration r/t acute dysphagia risk factors (i.e., sepsis due to PNA, COVID-19). Temporary diet modification + aspiration precautions as outlined above are recommended. SLP to continue to follow for dysphagia management.    History:     Past Medical History:   Diagnosis Date    Down's syndrome     Seizure        History reviewed. No pertinent surgical history.    Prior Intubation HX:  n/a    Modified Barium Swallow: n/a    Chest X-Rays: 06/28/24 - FINDINGS:  The heart is upper limits of normal in size.  Central pulmonary vascular congestion with interstitial edema.  Skeletal structures are intact pulmonary     Impression:     As above.    Prior diet: IDDSI 7/0 currently and PTA.    Subjective     Pt received in bed, appeared to be in no pain/distress, and demonstrated good motivation & participation in exam. Poor historian. Father at bedside denied acute/chronic dysphagia reports/concerns.  Patient goals: none communicated     Pain/Comfort:  Pain Rating 1: 0/10    Respiratory Status: Nasal cannula, flow 6 L/min    Objective:      Oral Musculature Evaluation  Oral Musculature: other (see comments), unable to assess due to poor participation/comprehension  Structural Abnormalities: open mouth posture in this pt w/ Down Syndrome  Dentition: present and adequate  Secretion Management: adequate  Volitional Cough: n/a    Bedside Swallow Eval:   Consistencies Assessed:  Thin liquids pt regulated single sips and small volume sequential straw, fed by SLP  Solids via large and small beryl cracker x2 - self-fed/fed by SLP      Oral Phase:   Decreased closure around utensil  Excess chewing  Prolonged mastication  Leakage, mouth breathing  Oral residue, required removal by SLP across large solid bolus presentation and liquid wash to clear w/ small solid bolus presentation  Lingual residue  Slow oral transit time    Pharyngeal Phase:   no overt clinical signs/symptoms of aspiration  no overt clinical signs/symptoms of pharyngeal dysphagia    Compensatory Strategies  Bolus texture modification  Bolus size modification  Alternate bites/sips  Allow extra time for meals      Goals:   Multidisciplinary Problems       SLP Goals          Problem: SLP    Goal Priority Disciplines Outcome   SLP Goal     SLP    Description: GOALS:    (1) Pt will tolerate least restrictive oral diet w/o acute dysphagia-related pulmonary complication  (2) Pt will participate in diagnostic swallow tx to guide further management                       Plan:     Patient to be seen:  5 x/week   Plan of Care expires:  07/12/24  Plan of Care reviewed with:  patient, father   SLP Follow-Up:  Yes       Discharge recommendations:  Moderate Intensity Therapy   Barriers to Discharge:  Level of Skilled Assistance Needed      Time Tracking:     SLP Treatment Date:   06/28/24  Speech Start Time:  1449  Speech Stop Time:  1518     Speech Total Time (min):  29 min    Billable Minutes: Eval Swallow and Oral Function 29 minutes    06/28/2024

## 2024-06-28 NOTE — ASSESSMENT & PLAN NOTE
"This patient does have evidence of infective focus  My overall impression is sepsis.  Source: Respiratory  Antibiotics given-   Antibiotics (72h ago, onward)    Start     Stop Route Frequency Ordered    06/28/24 2200  vancomycin 750 mg in D5W 250 mL IVPB (Vial-Mate)         -- IV Every 12 hours (non-standard times) 06/28/24 1011    06/28/24 0905  vancomycin - pharmacy to dose  (vancomycin IVPB (PEDS and ADULTS))        Placed in "And" Linked Group    -- IV pharmacy to manage frequency 06/28/24 0805    06/26/24 0900  mupirocin 2 % ointment         07/01/24 0859 Nasl 2 times daily 06/26/24 0753    06/24/24 1900  piperacillin-tazobactam (ZOSYN) 4.5 g in D5W 100 mL IVPB (MB+)         -- IV Every 8 hours (non-standard times) 06/24/24 1355        Latest lactate reviewed-  Recent Labs   Lab 06/28/24  0824   LACTATE 1.6       Organ dysfunction indicated by pulmonary edema on CXR     Fluid challenge 2 L bolus     Post- resuscitation assessment No - Post resuscitation assessment not needed     CXR pulmonary edema, L lobe consolidation   Will Not start Pressors- Levophed for MAP of 65  Source control achieved by: IV zosyn, IV remdesivir  Blood cultures pending/ U/A negative   BNP, procal & troponin wnl     Improving   "

## 2024-06-28 NOTE — ASSESSMENT & PLAN NOTE
Intermittent  KUB pending  Last bowel movement 2-3 days ago per nursing staff    6/26 Improving.  KUB with Bowel gas pattern.  LBM 6/25.      resolved

## 2024-06-28 NOTE — PROGRESS NOTES
Pharmacokinetic Initial Assessment: IV Vancomycin    Assessment/Plan:    Initiate intravenous vancomycin with loading dose of 1500 mg once followed by a maintenance dose of vancomycin 750 mg IV every 12 hours  Desired empiric serum trough concentration is 10 to 20 mcg/mL  Draw vancomycin trough level 60 min prior to fourth dose on 6/29/24 at approximately 2100  Pharmacy will continue to follow and monitor vancomycin.      Please contact pharmacy at extension [phone] with any questions regarding this assessment.     Thank you for the consult,   Cecilia Koehler       Patient brief summary:  Janes Strange is a 54 y.o. male initiated on antimicrobial therapy with IV Vancomycin for treatment of suspected  Pneumonia    Drug Allergies:   Review of patient's allergies indicates:   Allergen Reactions    Mayonnaise Blisters    Shellfish containing products      seafood       Actual Body Weight:   60 kg    Renal Function:   Estimated Creatinine Clearance: 71.7 mL/min (based on SCr of 0.9 mg/dL).,     Dialysis Method (if applicable):  N/A    CBC (last 72 hours):  Recent Labs   Lab Result Units 06/26/24  0756 06/27/24  1116 06/28/24  0824   WBC K/uL 14.75* 13.83* 22.81*   Hemoglobin g/dL 14.7 16.5 16.2   Hematocrit % 43.4 48.2 48.0   Platelets K/uL 408 389 407   Gran % % 71.9 85.5* 76.4*   Lymph % % 18.8 9.7* 16.0*   Mono % % 8.3 3.7* 6.1   Eosinophil % % 0.0 0.0 0.0   Basophil % % 0.1 0.1 0.2   Differential Method  Automated Automated Automated       Metabolic Panel (last 72 hours):  Recent Labs   Lab Result Units 06/26/24  0756 06/27/24  1116 06/28/24  0824   Sodium mmol/L 140 141 141   Potassium mmol/L 3.3* 3.4* 3.3*   Chloride mmol/L 106 106 106   CO2 mmol/L 23 23 24   Glucose mg/dL 123* 156* 105   BUN mg/dL 16 19 17   Creatinine mg/dL 0.9 1.0 0.9   Albumin g/dL 2.8* 2.7* 2.9*   Total Bilirubin mg/dL 0.7 0.6 0.7   Alkaline Phosphatase U/L 63 63 65   AST U/L 43* 35 23   ALT U/L 31 35 30   Magnesium mg/dL  --  1.8  --   "      Drug levels (last 3 results):  No results for input(s): "VANCOMYCINRA", "VANCORANDOM", "VANCOMYCINPE", "VANCOPEAK", "VANCOMYCINTR", "VANCOTROUGH" in the last 72 hours.    Microbiologic Results:  Microbiology Results (last 7 days)       Procedure Component Value Units Date/Time    Culture, Respiratory with Gram Stain [[id number]]     Order Status: No result Specimen: Respiratory     Blood culture #1 **CANNOT BE ORDERED STAT** [[id number]] Collected: 06/24/24 1042    Order Status: Completed Specimen: Blood from Peripheral, Hand, Left Updated: 06/27/24 1612     Blood Culture, Routine No Growth to date      No Growth to date      No Growth to date      No Growth to date    Blood culture #2 **CANNOT BE ORDERED STAT** [[id number]] Collected: 06/24/24 1036    Order Status: Completed Specimen: Blood from Peripheral, Forearm, Left Updated: 06/27/24 1612     Blood Culture, Routine No Growth to date      No Growth to date      No Growth to date      No Growth to date    Blood culture [[id number]]     Order Status: Canceled Specimen: Blood     Blood culture [[id number]]     Order Status: Canceled Specimen: Blood     Influenza A & B by Molecular [[id number]] Collected: 06/24/24 1026    Order Status: Completed Specimen: Nasopharyngeal Swab Updated: 06/24/24 1102     Influenza A, Molecular Negative     Influenza B, Molecular Negative     Flu A & B Source Nasal swab    Group A Strep, Molecular [[id number]] Collected: 06/24/24 1026    Order Status: Completed Specimen: Throat Updated: 06/24/24 1053     Group A Strep, Molecular Negative     Comment: Arcanobacterium haemolyticum and Beta Streptococcus group C   and G will not be detected by this test method.  Please order   Throat Culture ([id number]) if suspected.                 "

## 2024-06-28 NOTE — PT/OT/SLP PROGRESS
Physical Therapy      Patient Name:  Janes Strange   MRN:  [id number]    Patient not seen today secondary to Nursing care. Will follow-up later today when appropriate..

## 2024-06-28 NOTE — ASSESSMENT & PLAN NOTE
"Bilateral lobe hazziness L>R on CXR  Coverage for bacterial pneumonia although procal wnl.      Antibiotics (From admission, onward)      Start     Stop Route Frequency Ordered    06/28/24 2200  vancomycin 750 mg in D5W 250 mL IVPB (Vial-Mate)         -- IV Every 12 hours (non-standard times) 06/28/24 1011    06/28/24 0905  vancomycin - pharmacy to dose  (vancomycin IVPB (PEDS and ADULTS))        Placed in "And" Linked Group    -- IV pharmacy to manage frequency 06/28/24 0805    06/26/24 0900  mupirocin 2 % ointment         07/01/24 0859 Nasl 2 times daily 06/26/24 0753    06/24/24 1900  piperacillin-tazobactam (ZOSYN) 4.5 g in D5W 100 mL IVPB (MB+)         -- IV Every 8 hours (non-standard times) 06/24/24 1355            Microbiology Results (last 7 days)       Procedure Component Value Units Date/Time    Culture, Respiratory with Gram Stain [[id number]]     Order Status: No result Specimen: Respiratory     Blood culture #1 **CANNOT BE ORDERED STAT** [[id number]] Collected: 06/24/24 1042    Order Status: Completed Specimen: Blood from Peripheral, Hand, Left Updated: 06/27/24 1612     Blood Culture, Routine No Growth to date      No Growth to date      No Growth to date      No Growth to date    Blood culture #2 **CANNOT BE ORDERED STAT** [[id number]] Collected: 06/24/24 1036    Order Status: Completed Specimen: Blood from Peripheral, Forearm, Left Updated: 06/27/24 1612     Blood Culture, Routine No Growth to date      No Growth to date      No Growth to date      No Growth to date    Blood culture [[id number]]     Order Status: Canceled Specimen: Blood     Blood culture [[id number]]     Order Status: Canceled Specimen: Blood     Influenza A & B by Molecular [[id number]] Collected: 06/24/24 1026    Order Status: Completed Specimen: Nasopharyngeal Swab Updated: 06/24/24 1102     Influenza A, Molecular Negative     Influenza B, Molecular Negative     Flu A & B Source Nasal swab    Group A Strep, Molecular " [[id number]] Collected: 06/24/24 1026    Order Status: Completed Specimen: Throat Updated: 06/24/24 1053     Group A Strep, Molecular Negative     Comment: Arcanobacterium haemolyticum and Beta Streptococcus group C   and G will not be detected by this test method.  Please order   Throat Culture ([id number]) if suspected.               Continued left lobe consolidation

## 2024-06-29 LAB
ALBUMIN SERPL BCP-MCNC: 2.4 G/DL (ref 3.5–5.2)
ALP SERPL-CCNC: 53 U/L (ref 55–135)
ALT SERPL W/O P-5'-P-CCNC: 24 U/L (ref 10–44)
ANION GAP SERPL CALC-SCNC: 9 MMOL/L (ref 8–16)
AST SERPL-CCNC: 16 U/L (ref 10–40)
BACTERIA BLD CULT: NORMAL
BACTERIA BLD CULT: NORMAL
BASOPHILS # BLD AUTO: 0.02 K/UL (ref 0–0.2)
BASOPHILS NFR BLD: 0.1 % (ref 0–1.9)
BILIRUB SERPL-MCNC: 0.5 MG/DL (ref 0.1–1)
BUN SERPL-MCNC: 19 MG/DL (ref 6–20)
CALCIUM SERPL-MCNC: 8.7 MG/DL (ref 8.7–10.5)
CHLORIDE SERPL-SCNC: 108 MMOL/L (ref 95–110)
CO2 SERPL-SCNC: 23 MMOL/L (ref 23–29)
CREAT SERPL-MCNC: 1 MG/DL (ref 0.5–1.4)
DIFFERENTIAL METHOD BLD: ABNORMAL
EOSINOPHIL # BLD AUTO: 0 K/UL (ref 0–0.5)
EOSINOPHIL NFR BLD: 0 % (ref 0–8)
ERYTHROCYTE [DISTWIDTH] IN BLOOD BY AUTOMATED COUNT: 15.5 % (ref 11.5–14.5)
EST. GFR  (NO RACE VARIABLE): >60 ML/MIN/1.73 M^2
GLUCOSE SERPL-MCNC: 135 MG/DL (ref 70–110)
HCT VFR BLD AUTO: 43.5 % (ref 40–54)
HGB BLD-MCNC: 14.7 G/DL (ref 14–18)
IMM GRANULOCYTES # BLD AUTO: 0.18 K/UL (ref 0–0.04)
IMM GRANULOCYTES NFR BLD AUTO: 0.8 % (ref 0–0.5)
LYMPHOCYTES # BLD AUTO: 2.8 K/UL (ref 1–4.8)
LYMPHOCYTES NFR BLD: 13.2 % (ref 18–48)
MCH RBC QN AUTO: 28.9 PG (ref 27–31)
MCHC RBC AUTO-ENTMCNC: 33.8 G/DL (ref 32–36)
MCV RBC AUTO: 86 FL (ref 82–98)
MONOCYTES # BLD AUTO: 1.2 K/UL (ref 0.3–1)
MONOCYTES NFR BLD: 5.5 % (ref 4–15)
NEUTROPHILS # BLD AUTO: 17.2 K/UL (ref 1.8–7.7)
NEUTROPHILS NFR BLD: 80.4 % (ref 38–73)
NRBC BLD-RTO: 0 /100 WBC
PLATELET # BLD AUTO: 385 K/UL (ref 150–450)
PMV BLD AUTO: 9.8 FL (ref 9.2–12.9)
POTASSIUM SERPL-SCNC: 3.8 MMOL/L (ref 3.5–5.1)
PROT SERPL-MCNC: 5.9 G/DL (ref 6–8.4)
RBC # BLD AUTO: 5.09 M/UL (ref 4.6–6.2)
SODIUM SERPL-SCNC: 140 MMOL/L (ref 136–145)
VANCOMYCIN TROUGH SERPL-MCNC: 14.4 UG/ML (ref 10–22)
WBC # BLD AUTO: 21.38 K/UL (ref 3.9–12.7)

## 2024-06-29 PROCEDURE — 25000003 PHARM REV CODE 250: Performed by: FAMILY MEDICINE

## 2024-06-29 PROCEDURE — C9113 INJ PANTOPRAZOLE SODIUM, VIA: HCPCS | Performed by: PHYSICIAN ASSISTANT

## 2024-06-29 PROCEDURE — 27000207 HC ISOLATION

## 2024-06-29 PROCEDURE — 27000221 HC OXYGEN, UP TO 24 HOURS

## 2024-06-29 PROCEDURE — 21400001 HC TELEMETRY ROOM

## 2024-06-29 PROCEDURE — 36415 COLL VENOUS BLD VENIPUNCTURE: CPT | Performed by: FAMILY MEDICINE

## 2024-06-29 PROCEDURE — 25000003 PHARM REV CODE 250: Performed by: INTERNAL MEDICINE

## 2024-06-29 PROCEDURE — 25000003 PHARM REV CODE 250: Performed by: PHYSICIAN ASSISTANT

## 2024-06-29 PROCEDURE — 94799 UNLISTED PULMONARY SVC/PX: CPT | Mod: XB

## 2024-06-29 PROCEDURE — 80053 COMPREHEN METABOLIC PANEL: CPT | Performed by: FAMILY MEDICINE

## 2024-06-29 PROCEDURE — 94761 N-INVAS EAR/PLS OXIMETRY MLT: CPT

## 2024-06-29 PROCEDURE — 99900035 HC TECH TIME PER 15 MIN (STAT)

## 2024-06-29 PROCEDURE — 85025 COMPLETE CBC W/AUTO DIFF WBC: CPT | Performed by: FAMILY MEDICINE

## 2024-06-29 PROCEDURE — 63600175 PHARM REV CODE 636 W HCPCS: Performed by: INTERNAL MEDICINE

## 2024-06-29 PROCEDURE — 99233 SBSQ HOSP IP/OBS HIGH 50: CPT | Mod: ,,, | Performed by: FAMILY MEDICINE

## 2024-06-29 PROCEDURE — 36415 COLL VENOUS BLD VENIPUNCTURE: CPT | Performed by: INTERNAL MEDICINE

## 2024-06-29 PROCEDURE — 94640 AIRWAY INHALATION TREATMENT: CPT

## 2024-06-29 PROCEDURE — 63600175 PHARM REV CODE 636 W HCPCS: Performed by: PHYSICIAN ASSISTANT

## 2024-06-29 PROCEDURE — 80202 ASSAY OF VANCOMYCIN: CPT | Performed by: INTERNAL MEDICINE

## 2024-06-29 RX ADMIN — DEXAMETHASONE SODIUM PHOSPHATE 6 MG: 4 INJECTION, SOLUTION INTRA-ARTICULAR; INTRALESIONAL; INTRAMUSCULAR; INTRAVENOUS; SOFT TISSUE at 08:06

## 2024-06-29 RX ADMIN — PIPERACILLIN SODIUM AND TAZOBACTAM SODIUM 4.5 G: 4; .5 INJECTION, POWDER, LYOPHILIZED, FOR SOLUTION INTRAVENOUS at 06:06

## 2024-06-29 RX ADMIN — VANCOMYCIN HYDROCHLORIDE 750 MG: 750 INJECTION, POWDER, LYOPHILIZED, FOR SOLUTION INTRAVENOUS at 10:06

## 2024-06-29 RX ADMIN — ATORVASTATIN CALCIUM 40 MG: 20 TABLET, FILM COATED ORAL at 08:06

## 2024-06-29 RX ADMIN — TAMSULOSIN HYDROCHLORIDE 0.4 MG: 0.4 CAPSULE ORAL at 08:06

## 2024-06-29 RX ADMIN — ALLOPURINOL 300 MG: 100 TABLET ORAL at 08:06

## 2024-06-29 RX ADMIN — MUPIROCIN: 20 OINTMENT TOPICAL at 08:06

## 2024-06-29 RX ADMIN — ALBUTEROL SULFATE 2 PUFF: 90 AEROSOL, METERED RESPIRATORY (INHALATION) at 07:06

## 2024-06-29 RX ADMIN — PANTOPRAZOLE SODIUM 40 MG: 40 INJECTION, POWDER, FOR SOLUTION INTRAVENOUS at 08:06

## 2024-06-29 RX ADMIN — ALBUTEROL SULFATE 2 PUFF: 90 AEROSOL, METERED RESPIRATORY (INHALATION) at 01:06

## 2024-06-29 RX ADMIN — MUPIROCIN: 20 OINTMENT TOPICAL at 09:06

## 2024-06-29 RX ADMIN — CLOPIDOGREL BISULFATE 75 MG: 75 TABLET ORAL at 08:06

## 2024-06-29 RX ADMIN — GUAIFENESIN 600 MG: 600 TABLET, EXTENDED RELEASE ORAL at 08:06

## 2024-06-29 RX ADMIN — PIPERACILLIN SODIUM AND TAZOBACTAM SODIUM 4.5 G: 4; .5 INJECTION, POWDER, LYOPHILIZED, FOR SOLUTION INTRAVENOUS at 03:06

## 2024-06-29 RX ADMIN — PIPERACILLIN SODIUM AND TAZOBACTAM SODIUM 4.5 G: 4; .5 INJECTION, POWDER, LYOPHILIZED, FOR SOLUTION INTRAVENOUS at 12:06

## 2024-06-29 RX ADMIN — VANCOMYCIN HYDROCHLORIDE 750 MG: 750 INJECTION, POWDER, LYOPHILIZED, FOR SOLUTION INTRAVENOUS at 09:06

## 2024-06-29 RX ADMIN — Medication 1000 UNITS: at 08:06

## 2024-06-29 RX ADMIN — SODIUM CHLORIDE: 9 INJECTION, SOLUTION INTRAVENOUS at 03:06

## 2024-06-29 NOTE — ASSESSMENT & PLAN NOTE
"Bilateral lobe hazziness L>R on CXR  Coverage for bacterial pneumonia although procal wnl.      Antibiotics (From admission, onward)      Start     Stop Route Frequency Ordered    06/28/24 2200  vancomycin 750 mg in D5W 250 mL IVPB (Vial-Mate)         -- IV Every 12 hours (non-standard times) 06/28/24 1011    06/28/24 0905  vancomycin - pharmacy to dose  (vancomycin IVPB (PEDS and ADULTS))        Placed in "And" Linked Group    -- IV pharmacy to manage frequency 06/28/24 0805    06/26/24 0900  mupirocin 2 % ointment         07/01/24 0859 Nasl 2 times daily 06/26/24 0753    06/24/24 1900  piperacillin-tazobactam (ZOSYN) 4.5 g in D5W 100 mL IVPB (MB+)         -- IV Every 8 hours (non-standard times) 06/24/24 1355            Microbiology Results (last 7 days)       Procedure Component Value Units Date/Time    Blood culture #2 **CANNOT BE ORDERED STAT** [[id number]] Collected: 06/24/24 1036    Order Status: Completed Specimen: Blood from Peripheral, Forearm, Left Updated: 06/28/24 1612     Blood Culture, Routine No Growth to date      No Growth to date      No Growth to date      No Growth to date      No Growth to date    Blood culture #1 **CANNOT BE ORDERED STAT** [[id number]] Collected: 06/24/24 1042    Order Status: Completed Specimen: Blood from Peripheral, Hand, Left Updated: 06/28/24 1612     Blood Culture, Routine No Growth to date      No Growth to date      No Growth to date      No Growth to date      No Growth to date    Culture, Respiratory with Gram Stain [[id number]]     Order Status: No result Specimen: Respiratory     Blood culture [[id number]]     Order Status: Canceled Specimen: Blood     Blood culture [[id number]]     Order Status: Canceled Specimen: Blood     Influenza A & B by Molecular [[id number]] Collected: 06/24/24 1026    Order Status: Completed Specimen: Nasopharyngeal Swab Updated: 06/24/24 1102     Influenza A, Molecular Negative     Influenza B, Molecular Negative     Flu A & B Source " Nasal swab    Group A Strep, Molecular [[id number]] Collected: 06/24/24 1026    Order Status: Completed Specimen: Throat Updated: 06/24/24 1053     Group A Strep, Molecular Negative     Comment: Arcanobacterium haemolyticum and Beta Streptococcus group C   and G will not be detected by this test method.  Please order   Throat Culture ([id number]) if suspected.               Continued left lobe consolidation   Repeat chest x-ray tomorrow

## 2024-06-29 NOTE — ASSESSMENT & PLAN NOTE
"This patient does have evidence of infective focus  My overall impression is sepsis.  Source: Respiratory  Antibiotics given-   Antibiotics (72h ago, onward)      Start     Stop Route Frequency Ordered    06/28/24 2200  vancomycin 750 mg in D5W 250 mL IVPB (Vial-Mate)         -- IV Every 12 hours (non-standard times) 06/28/24 1011    06/28/24 0905  vancomycin - pharmacy to dose  (vancomycin IVPB (PEDS and ADULTS))        Placed in "And" Linked Group    -- IV pharmacy to manage frequency 06/28/24 0805    06/26/24 0900  mupirocin 2 % ointment         07/01/24 0859 Nasl 2 times daily 06/26/24 0753    06/24/24 1900  piperacillin-tazobactam (ZOSYN) 4.5 g in D5W 100 mL IVPB (MB+)         -- IV Every 8 hours (non-standard times) 06/24/24 1355          Latest lactate reviewed-  Recent Labs   Lab 06/28/24  0824   LACTATE 1.6       Organ dysfunction indicated by pulmonary edema on CXR     Fluid challenge 2 L bolus     Post- resuscitation assessment No - Post resuscitation assessment not needed     CXR pulmonary edema, L lobe consolidation   Will Not start Pressors- Levophed for MAP of 65  Source control achieved by: IV zosyn, IV vancomycin  Blood cultures pending/ U/A negative   BNP, procal & troponin wnl     Improving.  Patient now on 3 L nasal cannula and getting better.  "

## 2024-06-29 NOTE — ASSESSMENT & PLAN NOTE
Patient is identified as Severe COVID-19 based on hypoxemia with O2 saturations <94% on room air or on ambulation   Initiate standard COVID protocols; COVID-19 testing ,Infection Control notification  and isolation- respiratory, contact and droplet per protocol    Diagnostics: CBC, CMP, Ferritin, CRP, and Portable CXR    Management: Maintain oxygen saturations 92-96% via currently on room air and monitor with continuous/intermittent pulse oximetry. , Inhaled bronchodilators as needed for shortness of breath., and Continuous cardiac monitoring.    Advance Care Planning  Current advance care plan has not been discussed with patient. Attempted to contact father     Increased supplemental oxygen, dexamethasone, albuterol.    Patient completed remdesivir

## 2024-06-29 NOTE — PROGRESS NOTES
[facility] ([facility])  [facility]  Progress Note    Patient Name: Janes Strange  MRN: [id number]  Patient Class: IP- Inpatient   Admission Date: 6/24/2024  Length of Stay: 4 days  Attending Physician: Tomasz Castillo MD  Primary Care Provider: Luna Kelly NP        Subjective:     Principal Problem:Sepsis due to pneumonia        HPI:  Patient is a 54 year old male with medical history of Down's Syndrome, HLD, TIA and chronic cough who presented to the ED with cough and congestion for 2 weeks.  Patient currently not answering and history obtained from chart review.  Attempted to call father but is currently at eye doctor appointment.        Admitted for sepsis secondary to covid & bacterial pneumonia           Overview/Hospital Course:  PT admitted for sepsis secondary to covid pneumonia.  Hypoxia occurs when sleeping and will wean supplemental oxygen.  CXR pending.  Jones catheter in place due to urinary retention.  U/A negative.  Abdomen distended at times but then relaxes.  KUB pending.  Lactic acid elevated yesterday.  Will get repeat lactic acid this morning.      6/26: PT HD stable on 1 L of supplemental oxygen with oxygen saturation 95%.  Plan to wean supplemental oxygen and discharge within the next 24 hours.        6/27 Yesterday patient was able to be weaned off oxygen but became hypoxic overnight.  PT currently HD stable on 2-3 L nasal cannula.  CXR pending.  Respiratory exam with good air movement and no wheezing.  Will attempt to wean supplemental oxygen today.      6/28 Worsening hypoxia and now on 6 L.  Added vancomycin to IV zosyn.  Patient's last day of remdesivir for covid is today.  Discussed with pulmonology and will likely round on patient tomorrow.  Discussed plan of care with dad.      6/29 patient is still on 6 L nasal cannula but seems to be stable.  Her O2 sats are in the mid 90 percentile.  Patient is still on Decadron, vancomycin, Zosyn.  He seems to be getting  better.    Past Medical History:   Diagnosis Date    Down's syndrome     Seizure        History reviewed. No pertinent surgical history.    Review of patient's allergies indicates:   Allergen Reactions    Mayonnaise Blisters    Shellfish containing products      seafood       No current facility-administered medications on file prior to encounter.     Current Outpatient Medications on File Prior to Encounter   Medication Sig    allopurinoL (ZYLOPRIM) 300 MG tablet Take 1 tablet (300 mg total) by mouth once daily.    clopidogreL (PLAVIX) 75 mg tablet Take 1 tablet by mouth once daily    cyanocobalamin 500 MCG tablet Take 500 mcg by mouth once daily.    loratadine (CLARITIN) 10 mg tablet Take 1 tablet (10 mg total) by mouth once daily.    MULTIVITAMIN ORAL Take 1 tablet by mouth once daily. Centrum vitamin    omeprazole (PRILOSEC) 20 MG capsule Take 1 capsule (20 mg total) by mouth once daily.    rosuvastatin (CRESTOR) 10 MG tablet Take 10 mg by mouth once daily.    vitamin D 1000 units Tab Take 185 mg by mouth once daily.    mupirocin (BACTROBAN) 2 % ointment Apply topically 3 (three) times daily.     Family History    None       Tobacco Use    Smoking status: Never    Smokeless tobacco: Never   Substance and Sexual Activity    Alcohol use: No    Drug use: No    Sexual activity: Never     Review of Systems   Reason unable to perform ROS: patient not answering questions.     Objective:     Vital Signs (Most Recent):  Temp: 97.8 °F (36.6 °C) (06/29/24 0846)  Pulse: 92 (06/29/24 1000)  Resp: 18 (06/29/24 0846)  BP: 113/72 (06/29/24 0846)  SpO2: (!) 90 % (06/29/24 0846) Vital Signs (24h Range):  Temp:  [96 °F (35.6 °C)-97.9 °F (36.6 °C)] 97.8 °F (36.6 °C)  Pulse:  [] 92  Resp:  [18-22] 18  SpO2:  [90 %-94 %] 90 %  BP: (113-146)/(60-83) 113/72     Weight: 60 kg (132 lb 4.4 oz)  Body mass index is 27.65 kg/m².     Physical Exam  Constitutional:       General: He is not in acute distress.     Comments: Bent over in  "bed. Nursing staff stating this is his normal comfortable position    HENT:      Head: Normocephalic and atraumatic.   Eyes:      General:         Right eye: No discharge.         Left eye: No discharge.   Cardiovascular:      Rate and Rhythm: Normal rate and regular rhythm.   Pulmonary:      Effort: Pulmonary effort is normal. No respiratory distress.      Breath sounds: Normal breath sounds.   Abdominal:      General: Bowel sounds are normal. There is no distension (c).      Palpations: Abdomen is soft.      Tenderness: There is no abdominal tenderness.   Musculoskeletal:         General: No swelling or tenderness.      Cervical back: Neck supple. No tenderness.   Skin:     General: Skin is warm and dry.   Neurological:      General: No focal deficit present.      Mental Status: He is alert. Mental status is at baseline.   Psychiatric:         Mood and Affect: Mood normal.         Behavior: Behavior normal.                Significant Labs: A1C: No results for input(s): "HGBA1C" in the last 4320 hours.  ABGs: No results for input(s): "PH", "PCO2", "HCO3", "POCSATURATED", "BE", "TOTALHB", "COHB", "METHB", "O2HB", "POCFIO2", "PO2" in the last 48 hours.  Bilirubin:   Recent Labs   Lab 06/25/24  0433 06/26/24  0756 06/27/24  1116 06/28/24  0824 06/29/24  0426   BILITOT 0.5  0.5 0.7 0.6 0.7 0.5     Blood Culture:   No results for input(s): "LABBLOO" in the last 48 hours.    BMP:   Recent Labs   Lab 06/27/24  1116 06/28/24  0824 06/29/24  0426   *   < > 135*      < > 140   K 3.4*   < > 3.8      < > 108   CO2 23   < > 23   BUN 19   < > 19   CREATININE 1.0   < > 1.0   CALCIUM 8.7   < > 8.7   MG 1.8  --   --     < > = values in this interval not displayed.     CBC:   Recent Labs   Lab 06/27/24  1116 06/28/24  0824 06/29/24  0426   WBC 13.83* 22.81* 21.38*   HGB 16.5 16.2 14.7   HCT 48.2 48.0 43.5    407 385     CMP:   Recent Labs   Lab 06/27/24  1116 06/28/24  0824 06/29/24  0426    141 140 " "  K 3.4* 3.3* 3.8    106 108   CO2 23 24 23   * 105 135*   BUN 19 17 19   CREATININE 1.0 0.9 1.0   CALCIUM 8.7 9.2 8.7   PROT 6.3 6.9 5.9*   ALBUMIN 2.7* 2.9* 2.4*   BILITOT 0.6 0.7 0.5   ALKPHOS 63 65 53*   AST 35 23 16   ALT 35 30 24   ANIONGAP 12 11 9     Cardiac Markers:   Recent Labs   Lab 06/28/24  0824   BNP 52       Coagulation: No results for input(s): "PT", "INR", "APTT" in the last 48 hours.  Lactic Acid:   Recent Labs   Lab 06/27/24  1116 06/28/24  0824   LACTATE 2.9* 1.6     Lipase: No results for input(s): "LIPASE" in the last 48 hours.  Lipid Panel: No results for input(s): "CHOL", "HDL", "LDLCALC", "TRIG", "CHOLHDL" in the last 48 hours.  Magnesium:   Recent Labs   Lab 06/27/24  1116   MG 1.8       POCT Glucose: No results for input(s): "POCTGLUCOSE" in the last 48 hours.  Prealbumin: No results for input(s): "PREALBUMIN" in the last 48 hours.  Respiratory Culture: No results for input(s): "GSRESP", "RESPIRATORYC" in the last 48 hours.  Troponin:   No results for input(s): "TROPONINI", "TROPONINIHS" in the last 48 hours.    TSH:   Recent Labs   Lab 02/20/24  0831   TSH 4.622*     Urine Culture: No results for input(s): "LABURIN" in the last 48 hours.  Urine Studies:   No results for input(s): "COLORU", "APPEARANCEUA", "PHUR", "SPECGRAV", "PROTEINUA", "GLUCUA", "KETONESU", "BILIRUBINUA", "OCCULTUA", "NITRITE", "UROBILINOGEN", "LEUKOCYTESUR", "RBCUA", "WBCUA", "BACTERIA", "SQUAMEPITHEL", "HYALINECASTS" in the last 48 hours.    Invalid input(s): "WRIGHTSUR"      Significant Imaging: I have reviewed all pertinent imaging results/findings within the past 24 hours.    Assessment/Plan:      * Sepsis due to pneumonia  This patient does have evidence of infective focus  My overall impression is sepsis.  Source: Respiratory  Antibiotics given-   Antibiotics (72h ago, onward)      Start     Stop Route Frequency Ordered    06/28/24 2200  vancomycin 750 mg in D5W 250 mL IVPB (Vial-Mate)         -- IV " "Every 12 hours (non-standard times) 06/28/24 1011    06/28/24 0905  vancomycin - pharmacy to dose  (vancomycin IVPB (PEDS and ADULTS))        Placed in "And" Linked Group    -- IV pharmacy to manage frequency 06/28/24 0805    06/26/24 0900  mupirocin 2 % ointment         07/01/24 0859 Nasl 2 times daily 06/26/24 0753    06/24/24 1900  piperacillin-tazobactam (ZOSYN) 4.5 g in D5W 100 mL IVPB (MB+)         -- IV Every 8 hours (non-standard times) 06/24/24 1355          Latest lactate reviewed-  Recent Labs   Lab 06/28/24  0824   LACTATE 1.6       Organ dysfunction indicated by pulmonary edema on CXR     Fluid challenge 2 L bolus     Post- resuscitation assessment No - Post resuscitation assessment not needed     CXR pulmonary edema, L lobe consolidation   Will Not start Pressors- Levophed for MAP of 65  Source control achieved by: IV zosyn, IV remdesivir  Blood cultures pending/ U/A negative   BNP, procal & troponin wnl     Improving     Hypoxia  Due to covid pneumonia/bacterial pneumonia  CXR with increased hazziness in LLQ but laterally rotated  Waiting radiology read.  If unable to wean off oxygen will consider CTA chest and further work up.      6/28: No PE on CTA chest.  Persistent left lobe PNA.  Added vancomycin to IV zosyn for pneumonia treatment.  Pulmonology notified.  Hopefully we can start weaning shortly.      Abdominal distention  Intermittent  KUB pending  Last bowel movement 2-3 days ago per nursing staff    6/26 Improving.  KUB with Bowel gas pattern.  LBM 6/25.      resolved      Urinary retention  Jones catheter in place   Try to remove the Jones tomorrow      Pneumonia  Bilateral lobe hazziness L>R on CXR  Coverage for bacterial pneumonia although procal wnl.      Antibiotics (From admission, onward)      Start     Stop Route Frequency Ordered    06/28/24 2200  vancomycin 750 mg in D5W 250 mL IVPB (Vial-Mate)         -- IV Every 12 hours (non-standard times) 06/28/24 1011    06/28/24 0905  " "vancomycin - pharmacy to dose  (vancomycin IVPB (PEDS and ADULTS))        Placed in "And" Linked Group    -- IV pharmacy to manage frequency 06/28/24 0805    06/26/24 0900  mupirocin 2 % ointment         07/01/24 0859 Nasl 2 times daily 06/26/24 0753    06/24/24 1900  piperacillin-tazobactam (ZOSYN) 4.5 g in D5W 100 mL IVPB (MB+)         -- IV Every 8 hours (non-standard times) 06/24/24 1355            Microbiology Results (last 7 days)       Procedure Component Value Units Date/Time    Blood culture #2 **CANNOT BE ORDERED STAT** [[id number]] Collected: 06/24/24 1036    Order Status: Completed Specimen: Blood from Peripheral, Forearm, Left Updated: 06/28/24 1612     Blood Culture, Routine No Growth to date      No Growth to date      No Growth to date      No Growth to date      No Growth to date    Blood culture #1 **CANNOT BE ORDERED STAT** [[id number]] Collected: 06/24/24 1042    Order Status: Completed Specimen: Blood from Peripheral, Hand, Left Updated: 06/28/24 1612     Blood Culture, Routine No Growth to date      No Growth to date      No Growth to date      No Growth to date      No Growth to date    Culture, Respiratory with Gram Stain [[id number]]     Order Status: No result Specimen: Respiratory     Blood culture [[id number]]     Order Status: Canceled Specimen: Blood     Blood culture [[id number]]     Order Status: Canceled Specimen: Blood     Influenza A & B by Molecular [[id number]] Collected: 06/24/24 1026    Order Status: Completed Specimen: Nasopharyngeal Swab Updated: 06/24/24 1102     Influenza A, Molecular Negative     Influenza B, Molecular Negative     Flu A & B Source Nasal swab    Group A Strep, Molecular [[id number]] Collected: 06/24/24 1026    Order Status: Completed Specimen: Throat Updated: 06/24/24 1053     Group A Strep, Molecular Negative     Comment: Arcanobacterium haemolyticum and Beta Streptococcus group C   and G will not be detected by this test method.  Please order   Throat " Culture ([id number]) if suspected.               Continued left lobe consolidation   Repeat chest x-ray tomorrow    Elevated lactic acid level  Lactic acid 4.1 at presentation and now 2.8     6/25 Repeat lactic acid yesterday trending up to 3.1.  Currently only IV fluids.  Repeat lactic acid pending.      6/26 Lactic acid today 1.6.       COVID-19  Patient is identified as Severe COVID-19 based on hypoxemia with O2 saturations <94% on room air or on ambulation   Initiate standard COVID protocols; COVID-19 testing ,Infection Control notification  and isolation- respiratory, contact and droplet per protocol    Diagnostics: CBC, CMP, Ferritin, CRP, and Portable CXR    Management: Maintain oxygen saturations 92-96% via currently on room air and monitor with continuous/intermittent pulse oximetry. , Inhaled bronchodilators as needed for shortness of breath., and Continuous cardiac monitoring.    Advance Care Planning Current advance care plan has not been discussed with patient. Attempted to contact father     Increased supplemental oxygen, dexamethasone, albuterol.    Patient completed remdesivir        GERD (gastroesophageal reflux disease)  Continue protonix       HLD (hyperlipidemia)  Continue statin       History of TIA (transient ischemic attack)  Continue atorvastatin and plavix      Gout  Continue allopurinol      Down syndrome  Received haloperidol in the ED   Currently calm, awake and answers yes/no        VTE Risk Mitigation (From admission, onward)      None            Discharge Planning   CASTILLO:      Code Status: Full Code   Is the patient medically ready for discharge?:     Reason for patient still in hospital (select all that apply): Patient trending condition  Discharge Plan A: Home with family                  Tomasz Castillo MD  Department of Hospital Medicine   [place] [facility] Surg (3rd Fl)

## 2024-06-29 NOTE — ASSESSMENT & PLAN NOTE
Due to covid pneumonia/bacterial pneumonia  CXR with increased hazziness in LLQ but laterally rotated  Waiting radiology read.  If unable to wean off oxygen will consider CTA chest and further work up.      6/28: No PE on CTA chest.  Persistent left lobe PNA.  Added vancomycin to IV zosyn for pneumonia treatment.  Pulmonology notified.  Hopefully we can start weaning shortly.

## 2024-06-29 NOTE — PLAN OF CARE
Problem: Adult Inpatient Plan of Care  Goal: Plan of Care Review  Outcome: Progressing     Problem: Adult Inpatient Plan of Care  Goal: Patient-Specific Goal (Individualized)  Outcome: Progressing     Problem: Adult Inpatient Plan of Care  Goal: Optimal Comfort and Wellbeing  Outcome: Progressing

## 2024-06-29 NOTE — SUBJECTIVE & OBJECTIVE
Past Medical History:   Diagnosis Date    Down's syndrome     Seizure        History reviewed. No pertinent surgical history.    Review of patient's allergies indicates:   Allergen Reactions    Mayonnaise Blisters    Shellfish containing products      seafood       No current facility-administered medications on file prior to encounter.     Current Outpatient Medications on File Prior to Encounter   Medication Sig    allopurinoL (ZYLOPRIM) 300 MG tablet Take 1 tablet (300 mg total) by mouth once daily.    clopidogreL (PLAVIX) 75 mg tablet Take 1 tablet by mouth once daily    cyanocobalamin 500 MCG tablet Take 500 mcg by mouth once daily.    loratadine (CLARITIN) 10 mg tablet Take 1 tablet (10 mg total) by mouth once daily.    MULTIVITAMIN ORAL Take 1 tablet by mouth once daily. Centrum vitamin    omeprazole (PRILOSEC) 20 MG capsule Take 1 capsule (20 mg total) by mouth once daily.    rosuvastatin (CRESTOR) 10 MG tablet Take 10 mg by mouth once daily.    vitamin D 1000 units Tab Take 185 mg by mouth once daily.    mupirocin (BACTROBAN) 2 % ointment Apply topically 3 (three) times daily.     Family History    None       Tobacco Use    Smoking status: Never    Smokeless tobacco: Never   Substance and Sexual Activity    Alcohol use: No    Drug use: No    Sexual activity: Never     Review of Systems   Reason unable to perform ROS: patient not answering questions.     Objective:     Vital Signs (Most Recent):  Temp: 97.8 °F (36.6 °C) (06/29/24 0846)  Pulse: 92 (06/29/24 1000)  Resp: 18 (06/29/24 0846)  BP: 113/72 (06/29/24 0846)  SpO2: (!) 90 % (06/29/24 0846) Vital Signs (24h Range):  Temp:  [96 °F (35.6 °C)-97.9 °F (36.6 °C)] 97.8 °F (36.6 °C)  Pulse:  [] 92  Resp:  [18-22] 18  SpO2:  [90 %-94 %] 90 %  BP: (113-146)/(60-83) 113/72     Weight: 60 kg (132 lb 4.4 oz)  Body mass index is 27.65 kg/m².     Physical Exam  Constitutional:       General: He is not in acute distress.     Comments: Bent over in bed. Nursing  "staff stating this is his normal comfortable position    HENT:      Head: Normocephalic and atraumatic.   Eyes:      General:         Right eye: No discharge.         Left eye: No discharge.   Cardiovascular:      Rate and Rhythm: Normal rate and regular rhythm.   Pulmonary:      Effort: Pulmonary effort is normal. No respiratory distress.      Breath sounds: Normal breath sounds.   Abdominal:      General: Bowel sounds are normal. There is no distension (c).      Palpations: Abdomen is soft.      Tenderness: There is no abdominal tenderness.   Musculoskeletal:         General: No swelling or tenderness.      Cervical back: Neck supple. No tenderness.   Skin:     General: Skin is warm and dry.   Neurological:      General: No focal deficit present.      Mental Status: He is alert. Mental status is at baseline.   Psychiatric:         Mood and Affect: Mood normal.         Behavior: Behavior normal.                Significant Labs: A1C: No results for input(s): "HGBA1C" in the last 4320 hours.  ABGs: No results for input(s): "PH", "PCO2", "HCO3", "POCSATURATED", "BE", "TOTALHB", "COHB", "METHB", "O2HB", "POCFIO2", "PO2" in the last 48 hours.  Bilirubin:   Recent Labs   Lab 06/25/24  0433 06/26/24  0756 06/27/24  1116 06/28/24  0824 06/29/24  0426   BILITOT 0.5  0.5 0.7 0.6 0.7 0.5     Blood Culture:   No results for input(s): "LABBLOO" in the last 48 hours.    BMP:   Recent Labs   Lab 06/27/24  1116 06/28/24  0824 06/29/24  0426   *   < > 135*      < > 140   K 3.4*   < > 3.8      < > 108   CO2 23   < > 23   BUN 19   < > 19   CREATININE 1.0   < > 1.0   CALCIUM 8.7   < > 8.7   MG 1.8  --   --     < > = values in this interval not displayed.     CBC:   Recent Labs   Lab 06/27/24  1116 06/28/24  0824 06/29/24  0426   WBC 13.83* 22.81* 21.38*   HGB 16.5 16.2 14.7   HCT 48.2 48.0 43.5    407 385     CMP:   Recent Labs   Lab 06/27/24  1116 06/28/24  0824 06/29/24  0426    141 140   K 3.4* 3.3* " "3.8    106 108   CO2 23 24 23   * 105 135*   BUN 19 17 19   CREATININE 1.0 0.9 1.0   CALCIUM 8.7 9.2 8.7   PROT 6.3 6.9 5.9*   ALBUMIN 2.7* 2.9* 2.4*   BILITOT 0.6 0.7 0.5   ALKPHOS 63 65 53*   AST 35 23 16   ALT 35 30 24   ANIONGAP 12 11 9     Cardiac Markers:   Recent Labs   Lab 06/28/24  0824   BNP 52       Coagulation: No results for input(s): "PT", "INR", "APTT" in the last 48 hours.  Lactic Acid:   Recent Labs   Lab 06/27/24  1116 06/28/24  0824   LACTATE 2.9* 1.6     Lipase: No results for input(s): "LIPASE" in the last 48 hours.  Lipid Panel: No results for input(s): "CHOL", "HDL", "LDLCALC", "TRIG", "CHOLHDL" in the last 48 hours.  Magnesium:   Recent Labs   Lab 06/27/24  1116   MG 1.8       POCT Glucose: No results for input(s): "POCTGLUCOSE" in the last 48 hours.  Prealbumin: No results for input(s): "PREALBUMIN" in the last 48 hours.  Respiratory Culture: No results for input(s): "GSRESP", "RESPIRATORYC" in the last 48 hours.  Troponin:   No results for input(s): "TROPONINI", "TROPONINIHS" in the last 48 hours.    TSH:   Recent Labs   Lab 02/20/24  0831   TSH 4.622*     Urine Culture: No results for input(s): "LABURIN" in the last 48 hours.  Urine Studies:   No results for input(s): "COLORU", "APPEARANCEUA", "PHUR", "SPECGRAV", "PROTEINUA", "GLUCUA", "KETONESU", "BILIRUBINUA", "OCCULTUA", "NITRITE", "UROBILINOGEN", "LEUKOCYTESUR", "RBCUA", "WBCUA", "BACTERIA", "SQUAMEPITHEL", "HYALINECASTS" in the last 48 hours.    Invalid input(s): "WRIGHTSUR"      Significant Imaging: I have reviewed all pertinent imaging results/findings within the past 24 hours.  "

## 2024-06-30 LAB
ALBUMIN SERPL BCP-MCNC: 2.6 G/DL (ref 3.5–5.2)
ALP SERPL-CCNC: 62 U/L (ref 55–135)
ALT SERPL W/O P-5'-P-CCNC: 22 U/L (ref 10–44)
ANION GAP SERPL CALC-SCNC: 10 MMOL/L (ref 8–16)
AST SERPL-CCNC: 17 U/L (ref 10–40)
BASOPHILS # BLD AUTO: 0.05 K/UL (ref 0–0.2)
BASOPHILS NFR BLD: 0.3 % (ref 0–1.9)
BILIRUB SERPL-MCNC: 0.4 MG/DL (ref 0.1–1)
BUN SERPL-MCNC: 22 MG/DL (ref 6–20)
CALCIUM SERPL-MCNC: 9.2 MG/DL (ref 8.7–10.5)
CHLORIDE SERPL-SCNC: 106 MMOL/L (ref 95–110)
CO2 SERPL-SCNC: 22 MMOL/L (ref 23–29)
CREAT SERPL-MCNC: 0.9 MG/DL (ref 0.5–1.4)
DIFFERENTIAL METHOD BLD: ABNORMAL
EOSINOPHIL # BLD AUTO: 0 K/UL (ref 0–0.5)
EOSINOPHIL NFR BLD: 0.1 % (ref 0–8)
ERYTHROCYTE [DISTWIDTH] IN BLOOD BY AUTOMATED COUNT: 15.9 % (ref 11.5–14.5)
EST. GFR  (NO RACE VARIABLE): >60 ML/MIN/1.73 M^2
GLUCOSE SERPL-MCNC: 121 MG/DL (ref 70–110)
HCT VFR BLD AUTO: 45.9 % (ref 40–54)
HGB BLD-MCNC: 15.9 G/DL (ref 14–18)
IMM GRANULOCYTES # BLD AUTO: 0.21 K/UL (ref 0–0.04)
IMM GRANULOCYTES NFR BLD AUTO: 1.1 % (ref 0–0.5)
LYMPHOCYTES # BLD AUTO: 3.4 K/UL (ref 1–4.8)
LYMPHOCYTES NFR BLD: 18.5 % (ref 18–48)
MCH RBC QN AUTO: 29.8 PG (ref 27–31)
MCHC RBC AUTO-ENTMCNC: 34.6 G/DL (ref 32–36)
MCV RBC AUTO: 86 FL (ref 82–98)
MONOCYTES # BLD AUTO: 1.5 K/UL (ref 0.3–1)
MONOCYTES NFR BLD: 7.8 % (ref 4–15)
NEUTROPHILS # BLD AUTO: 13.4 K/UL (ref 1.8–7.7)
NEUTROPHILS NFR BLD: 72.2 % (ref 38–73)
NRBC BLD-RTO: 0 /100 WBC
PLATELET # BLD AUTO: 356 K/UL (ref 150–450)
PMV BLD AUTO: 10.2 FL (ref 9.2–12.9)
POTASSIUM SERPL-SCNC: 3.8 MMOL/L (ref 3.5–5.1)
PROT SERPL-MCNC: 6.3 G/DL (ref 6–8.4)
RBC # BLD AUTO: 5.33 M/UL (ref 4.6–6.2)
SODIUM SERPL-SCNC: 138 MMOL/L (ref 136–145)
WBC # BLD AUTO: 18.53 K/UL (ref 3.9–12.7)

## 2024-06-30 PROCEDURE — 25000003 PHARM REV CODE 250: Performed by: INTERNAL MEDICINE

## 2024-06-30 PROCEDURE — 94761 N-INVAS EAR/PLS OXIMETRY MLT: CPT

## 2024-06-30 PROCEDURE — 99233 SBSQ HOSP IP/OBS HIGH 50: CPT | Mod: ,,, | Performed by: FAMILY MEDICINE

## 2024-06-30 PROCEDURE — 25000003 PHARM REV CODE 250: Performed by: PHYSICIAN ASSISTANT

## 2024-06-30 PROCEDURE — 25000003 PHARM REV CODE 250: Performed by: FAMILY MEDICINE

## 2024-06-30 PROCEDURE — 27000221 HC OXYGEN, UP TO 24 HOURS

## 2024-06-30 PROCEDURE — 63600175 PHARM REV CODE 636 W HCPCS: Performed by: PHYSICIAN ASSISTANT

## 2024-06-30 PROCEDURE — 51798 US URINE CAPACITY MEASURE: CPT

## 2024-06-30 PROCEDURE — 94640 AIRWAY INHALATION TREATMENT: CPT

## 2024-06-30 PROCEDURE — 63600175 PHARM REV CODE 636 W HCPCS: Performed by: INTERNAL MEDICINE

## 2024-06-30 PROCEDURE — 94799 UNLISTED PULMONARY SVC/PX: CPT | Mod: XB

## 2024-06-30 PROCEDURE — 27000207 HC ISOLATION

## 2024-06-30 PROCEDURE — 21400001 HC TELEMETRY ROOM

## 2024-06-30 PROCEDURE — 99900035 HC TECH TIME PER 15 MIN (STAT)

## 2024-06-30 PROCEDURE — C9113 INJ PANTOPRAZOLE SODIUM, VIA: HCPCS | Performed by: PHYSICIAN ASSISTANT

## 2024-06-30 PROCEDURE — 85025 COMPLETE CBC W/AUTO DIFF WBC: CPT | Performed by: FAMILY MEDICINE

## 2024-06-30 PROCEDURE — 80053 COMPREHEN METABOLIC PANEL: CPT | Performed by: FAMILY MEDICINE

## 2024-06-30 RX ADMIN — CLOPIDOGREL BISULFATE 75 MG: 75 TABLET ORAL at 10:06

## 2024-06-30 RX ADMIN — PIPERACILLIN SODIUM AND TAZOBACTAM SODIUM 4.5 G: 4; .5 INJECTION, POWDER, LYOPHILIZED, FOR SOLUTION INTRAVENOUS at 07:06

## 2024-06-30 RX ADMIN — PIPERACILLIN SODIUM AND TAZOBACTAM SODIUM 4.5 G: 4; .5 INJECTION, POWDER, LYOPHILIZED, FOR SOLUTION INTRAVENOUS at 01:06

## 2024-06-30 RX ADMIN — TAMSULOSIN HYDROCHLORIDE 0.4 MG: 0.4 CAPSULE ORAL at 08:06

## 2024-06-30 RX ADMIN — PIPERACILLIN SODIUM AND TAZOBACTAM SODIUM 4.5 G: 4; .5 INJECTION, POWDER, LYOPHILIZED, FOR SOLUTION INTRAVENOUS at 03:06

## 2024-06-30 RX ADMIN — VANCOMYCIN HYDROCHLORIDE 750 MG: 750 INJECTION, POWDER, LYOPHILIZED, FOR SOLUTION INTRAVENOUS at 11:06

## 2024-06-30 RX ADMIN — SODIUM CHLORIDE: 9 INJECTION, SOLUTION INTRAVENOUS at 04:06

## 2024-06-30 RX ADMIN — PANTOPRAZOLE SODIUM 40 MG: 40 INJECTION, POWDER, FOR SOLUTION INTRAVENOUS at 10:06

## 2024-06-30 RX ADMIN — Medication 1000 UNITS: at 10:06

## 2024-06-30 RX ADMIN — ALBUTEROL SULFATE 2 PUFF: 90 AEROSOL, METERED RESPIRATORY (INHALATION) at 01:06

## 2024-06-30 RX ADMIN — DEXAMETHASONE SODIUM PHOSPHATE 6 MG: 4 INJECTION, SOLUTION INTRA-ARTICULAR; INTRALESIONAL; INTRAMUSCULAR; INTRAVENOUS; SOFT TISSUE at 10:06

## 2024-06-30 RX ADMIN — GUAIFENESIN 600 MG: 600 TABLET, EXTENDED RELEASE ORAL at 10:06

## 2024-06-30 RX ADMIN — SODIUM CHLORIDE: 9 INJECTION, SOLUTION INTRAVENOUS at 03:06

## 2024-06-30 RX ADMIN — MUPIROCIN: 20 OINTMENT TOPICAL at 10:06

## 2024-06-30 RX ADMIN — ALBUTEROL SULFATE 2 PUFF: 90 AEROSOL, METERED RESPIRATORY (INHALATION) at 06:06

## 2024-06-30 RX ADMIN — ATORVASTATIN CALCIUM 40 MG: 20 TABLET, FILM COATED ORAL at 08:06

## 2024-06-30 RX ADMIN — MUPIROCIN: 20 OINTMENT TOPICAL at 08:06

## 2024-06-30 RX ADMIN — VANCOMYCIN HYDROCHLORIDE 750 MG: 750 INJECTION, POWDER, LYOPHILIZED, FOR SOLUTION INTRAVENOUS at 10:06

## 2024-06-30 RX ADMIN — ALLOPURINOL 300 MG: 100 TABLET ORAL at 10:06

## 2024-06-30 RX ADMIN — ALBUTEROL SULFATE 2 PUFF: 90 AEROSOL, METERED RESPIRATORY (INHALATION) at 07:06

## 2024-06-30 RX ADMIN — GUAIFENESIN 600 MG: 600 TABLET, EXTENDED RELEASE ORAL at 08:06

## 2024-06-30 NOTE — ASSESSMENT & PLAN NOTE
Due to covid pneumonia/bacterial pneumonia  CXR with increased hazziness in LLQ but laterally rotated  Waiting radiology read.  If unable to wean off oxygen will consider CTA chest and further work up.      6/28: No PE on CTA chest.  Persistent left lobe PNA.  Added vancomycin to IV zosyn for pneumonia treatment.  Pulmonology notified.  Hopefully we can start weaning shortly.    6/30 getting better.  Now on 3 L nasal cannula

## 2024-06-30 NOTE — SUBJECTIVE & OBJECTIVE
Past Medical History:   Diagnosis Date    Down's syndrome     Seizure        History reviewed. No pertinent surgical history.    Review of patient's allergies indicates:   Allergen Reactions    Mayonnaise Blisters    Shellfish containing products      seafood       No current facility-administered medications on file prior to encounter.     Current Outpatient Medications on File Prior to Encounter   Medication Sig    allopurinoL (ZYLOPRIM) 300 MG tablet Take 1 tablet (300 mg total) by mouth once daily.    clopidogreL (PLAVIX) 75 mg tablet Take 1 tablet by mouth once daily    cyanocobalamin 500 MCG tablet Take 500 mcg by mouth once daily.    loratadine (CLARITIN) 10 mg tablet Take 1 tablet (10 mg total) by mouth once daily.    MULTIVITAMIN ORAL Take 1 tablet by mouth once daily. Centrum vitamin    omeprazole (PRILOSEC) 20 MG capsule Take 1 capsule (20 mg total) by mouth once daily.    rosuvastatin (CRESTOR) 10 MG tablet Take 10 mg by mouth once daily.    vitamin D 1000 units Tab Take 185 mg by mouth once daily.    mupirocin (BACTROBAN) 2 % ointment Apply topically 3 (three) times daily.     Family History    None       Tobacco Use    Smoking status: Never    Smokeless tobacco: Never   Substance and Sexual Activity    Alcohol use: No    Drug use: No    Sexual activity: Never     Review of Systems   Reason unable to perform ROS: patient not answering questions.     Objective:     Vital Signs (Most Recent):  Temp: 98.7 °F (37.1 °C) (06/30/24 0735)  Pulse: 99 (06/30/24 1000)  Resp: 18 (06/30/24 0735)  BP: (!) 129/92 (06/30/24 0735)  SpO2: 96 % (06/30/24 0735) Vital Signs (24h Range):  Temp:  [97.3 °F (36.3 °C)-98.7 °F (37.1 °C)] 98.7 °F (37.1 °C)  Pulse:  [] 99  Resp:  [18-20] 18  SpO2:  [90 %-98 %] 96 %  BP: (109-139)/(63-92) 129/92     Weight: 60 kg (132 lb 4.4 oz)  Body mass index is 27.65 kg/m².     Physical Exam  Constitutional:       General: He is not in acute distress.     Comments: Bent over in bed.  "Nursing staff stating this is his normal comfortable position    HENT:      Head: Normocephalic and atraumatic.   Eyes:      General:         Right eye: No discharge.         Left eye: No discharge.   Cardiovascular:      Rate and Rhythm: Normal rate and regular rhythm.   Pulmonary:      Effort: Pulmonary effort is normal. No respiratory distress.      Breath sounds: Normal breath sounds.   Abdominal:      General: Bowel sounds are normal. There is no distension (c).      Palpations: Abdomen is soft.      Tenderness: There is no abdominal tenderness.   Musculoskeletal:         General: No swelling or tenderness.      Cervical back: Neck supple. No tenderness.   Skin:     General: Skin is warm and dry.   Neurological:      General: No focal deficit present.      Mental Status: He is alert. Mental status is at baseline.   Psychiatric:         Mood and Affect: Mood normal.         Behavior: Behavior normal.                Significant Labs: A1C: No results for input(s): "HGBA1C" in the last 4320 hours.  ABGs: No results for input(s): "PH", "PCO2", "HCO3", "POCSATURATED", "BE", "TOTALHB", "COHB", "METHB", "O2HB", "POCFIO2", "PO2" in the last 48 hours.  Bilirubin:   Recent Labs   Lab 06/25/24  0433 06/26/24  0756 06/27/24  1116 06/28/24  0824 06/29/24  0426   BILITOT 0.5  0.5 0.7 0.6 0.7 0.5     Blood Culture:   No results for input(s): "LABBLOO" in the last 48 hours.    BMP:   Recent Labs   Lab 06/29/24  0426   *      K 3.8      CO2 23   BUN 19   CREATININE 1.0   CALCIUM 8.7     CBC:   Recent Labs   Lab 06/29/24  0426   WBC 21.38*   HGB 14.7   HCT 43.5        CMP:   Recent Labs   Lab 06/29/24  0426      K 3.8      CO2 23   *   BUN 19   CREATININE 1.0   CALCIUM 8.7   PROT 5.9*   ALBUMIN 2.4*   BILITOT 0.5   ALKPHOS 53*   AST 16   ALT 24   ANIONGAP 9     Cardiac Markers:   No results for input(s): "CKMB", "MYOGLOBIN", "BNP", "TROPISTAT" in the last 48 hours.      Coagulation: " "No results for input(s): "PT", "INR", "APTT" in the last 48 hours.  Lactic Acid:   No results for input(s): "LACTATE" in the last 48 hours.    Lipase: No results for input(s): "LIPASE" in the last 48 hours.  Lipid Panel: No results for input(s): "CHOL", "HDL", "LDLCALC", "TRIG", "CHOLHDL" in the last 48 hours.  Magnesium:   No results for input(s): "MG" in the last 48 hours.      POCT Glucose: No results for input(s): "POCTGLUCOSE" in the last 48 hours.  Prealbumin: No results for input(s): "PREALBUMIN" in the last 48 hours.  Respiratory Culture: No results for input(s): "GSRESP", "RESPIRATORYC" in the last 48 hours.  Troponin:   No results for input(s): "TROPONINI", "TROPONINIHS" in the last 48 hours.    TSH:   Recent Labs   Lab 02/20/24  0831   TSH 4.622*     Urine Culture: No results for input(s): "LABURIN" in the last 48 hours.  Urine Studies:   No results for input(s): "COLORU", "APPEARANCEUA", "PHUR", "SPECGRAV", "PROTEINUA", "GLUCUA", "KETONESU", "BILIRUBINUA", "OCCULTUA", "NITRITE", "UROBILINOGEN", "LEUKOCYTESUR", "RBCUA", "WBCUA", "BACTERIA", "SQUAMEPITHEL", "HYALINECASTS" in the last 48 hours.    Invalid input(s): "WRIGHTSUR"      Significant Imaging: I have reviewed all pertinent imaging results/findings within the past 24 hours.  "

## 2024-06-30 NOTE — PROGRESS NOTES
Pharmacokinetic Assessment Follow Up: IV Vancomycin    Vancomycin serum concentration assessment(s):    The trough level was drawn correctly and can be used to guide therapy at this time. The measurement is within the desired definitive target range of 10 to 20 mcg/mL.    Vancomycin Regimen Plan:    Continue current regimen, next level 7/1 at 0900    Drug levels (last 3 results):  Recent Labs   Lab Result Units 06/29/24  2103   Vancomycin-Trough ug/mL 14.4       Pharmacy will continue to follow and monitor vancomycin.    Please contact pharmacy for questions regarding this assessment.    Thank you for the consult,   Magaly Lamas       Patient brief summary:  Janes Strange is a 54 y.o. male initiated on antimicrobial therapy with IV Vancomycin for treatment of lower respiratory infection    The patient's current regimen is 750mg q12h    Drug Allergies:   Review of patient's allergies indicates:   Allergen Reactions    Mayonnaise Blisters    Shellfish containing products      seafood       Actual Body Weight:   60kg    Renal Function:   Estimated Creatinine Clearance: 64.5 mL/min (based on SCr of 1 mg/dL).,     Dialysis Method (if applicable):  N/A    CBC (last 72 hours):  Recent Labs   Lab Result Units 06/27/24  1116 06/28/24  0824 06/29/24  0426   WBC K/uL 13.83* 22.81* 21.38*   Hemoglobin g/dL 16.5 16.2 14.7   Hematocrit % 48.2 48.0 43.5   Platelets K/uL 389 407 385   Gran % % 85.5* 76.4* 80.4*   Lymph % % 9.7* 16.0* 13.2*   Mono % % 3.7* 6.1 5.5   Eosinophil % % 0.0 0.0 0.0   Basophil % % 0.1 0.2 0.1   Differential Method  Automated Automated Automated       Metabolic Panel (last 72 hours):  Recent Labs   Lab Result Units 06/27/24  1116 06/28/24  0824 06/29/24  0426   Sodium mmol/L 141 141 140   Potassium mmol/L 3.4* 3.3* 3.8   Chloride mmol/L 106 106 108   CO2 mmol/L 23 24 23   Glucose mg/dL 156* 105 135*   BUN mg/dL 19 17 19   Creatinine mg/dL 1.0 0.9 1.0   Albumin g/dL 2.7* 2.9* 2.4*   Total Bilirubin mg/dL 0.6  0.7 0.5   Alkaline Phosphatase U/L 63 65 53*   AST U/L 35 23 16   ALT U/L 35 30 24   Magnesium mg/dL 1.8  --   --        Vancomycin Administrations:  vancomycin given in the last 96 hours                     vancomycin 750 mg in D5W 250 mL IVPB (Vial-Mate) (mg) 750 mg New Bag 06/29/24 1010     750 mg New Bag 06/28/24 2248    vancomycin 1,500 mg in D5W 250 mL IVPB (Vial-Mate) (mg) 1,500 mg New Bag 06/28/24 1000                    Microbiologic Results:  Microbiology Results (last 7 days)       Procedure Component Value Units Date/Time    Blood culture #1 **CANNOT BE ORDERED STAT** [[id number]] Collected: 06/24/24 1042    Order Status: Completed Specimen: Blood from Peripheral, Hand, Left Updated: 06/29/24 1612     Blood Culture, Routine No growth after 5 days.    Blood culture #2 **CANNOT BE ORDERED STAT** [[id number]] Collected: 06/24/24 1036    Order Status: Completed Specimen: Blood from Peripheral, Forearm, Left Updated: 06/29/24 1612     Blood Culture, Routine No growth after 5 days.    Culture, Respiratory with Gram Stain [[id number]]     Order Status: No result Specimen: Respiratory     Blood culture [[id number]]     Order Status: Canceled Specimen: Blood     Blood culture [[id number]]     Order Status: Canceled Specimen: Blood     Influenza A & B by Molecular [[id number]] Collected: 06/24/24 1026    Order Status: Completed Specimen: Nasopharyngeal Swab Updated: 06/24/24 1102     Influenza A, Molecular Negative     Influenza B, Molecular Negative     Flu A & B Source Nasal swab    Group A Strep, Molecular [[id number]] Collected: 06/24/24 1026    Order Status: Completed Specimen: Throat Updated: 06/24/24 1053     Group A Strep, Molecular Negative     Comment: Arcanobacterium haemolyticum and Beta Streptococcus group C   and G will not be detected by this test method.  Please order   Throat Culture ([id number]) if suspected.

## 2024-06-30 NOTE — ASSESSMENT & PLAN NOTE
"Bilateral lobe hazziness L>R on CXR  Coverage for bacterial pneumonia although procal wnl.      Antibiotics (From admission, onward)      Start     Stop Route Frequency Ordered    06/28/24 2200  vancomycin 750 mg in D5W 250 mL IVPB (Vial-Mate)         -- IV Every 12 hours (non-standard times) 06/28/24 1011    06/28/24 0905  vancomycin - pharmacy to dose  (vancomycin IVPB (PEDS and ADULTS))        Placed in "And" Linked Group    -- IV pharmacy to manage frequency 06/28/24 0805    06/26/24 0900  mupirocin 2 % ointment         07/01/24 0859 Nasl 2 times daily 06/26/24 0753    06/24/24 1900  piperacillin-tazobactam (ZOSYN) 4.5 g in D5W 100 mL IVPB (MB+)         -- IV Every 8 hours (non-standard times) 06/24/24 1355            Microbiology Results (last 7 days)       Procedure Component Value Units Date/Time    Blood culture #1 **CANNOT BE ORDERED STAT** [[id number]] Collected: 06/24/24 1042    Order Status: Completed Specimen: Blood from Peripheral, Hand, Left Updated: 06/29/24 1612     Blood Culture, Routine No growth after 5 days.    Blood culture #2 **CANNOT BE ORDERED STAT** [[id number]] Collected: 06/24/24 1036    Order Status: Completed Specimen: Blood from Peripheral, Forearm, Left Updated: 06/29/24 1612     Blood Culture, Routine No growth after 5 days.    Culture, Respiratory with Gram Stain [[id number]]     Order Status: No result Specimen: Respiratory     Blood culture [[id number]]     Order Status: Canceled Specimen: Blood     Blood culture [[id number]]     Order Status: Canceled Specimen: Blood     Influenza A & B by Molecular [[id number]] Collected: 06/24/24 1026    Order Status: Completed Specimen: Nasopharyngeal Swab Updated: 06/24/24 1102     Influenza A, Molecular Negative     Influenza B, Molecular Negative     Flu A & B Source Nasal swab    Group A Strep, Molecular [[id number]] Collected: 06/24/24 1026    Order Status: Completed Specimen: Throat Updated: 06/24/24 1053     Group A Strep, Molecular " Negative     Comment: Arcanobacterium haemolyticum and Beta Streptococcus group C   and G will not be detected by this test method.  Please order   Throat Culture ([id number]) if suspected.               Continued left lobe consolidation   Repeat chest x-ray today looks pretty good.

## 2024-06-30 NOTE — PROGRESS NOTES
[facility] ([facility])  [facility] Medicine  Progress Note    Patient Name: Janes Strange  MRN: [id number]  Patient Class: IP- Inpatient   Admission Date: 6/24/2024  Length of Stay: 5 days  Attending Physician: Tomasz Castillo MD  Primary Care Provider: Luna Kelly NP        Subjective:     Principal Problem:Sepsis due to pneumonia        HPI:  Patient is a 54 year old male with medical history of Down's Syndrome, HLD, TIA and chronic cough who presented to the ED with cough and congestion for 2 weeks.  Patient currently not answering and history obtained from chart review.  Attempted to call father but is currently at eye doctor appointment.        Admitted for sepsis secondary to covid & bacterial pneumonia           Overview/Hospital Course:  PT admitted for sepsis secondary to covid pneumonia.  Hypoxia occurs when sleeping and will wean supplemental oxygen.  CXR pending.  Jones catheter in place due to urinary retention.  U/A negative.  Abdomen distended at times but then relaxes.  KUB pending.  Lactic acid elevated yesterday.  Will get repeat lactic acid this morning.      6/26: PT HD stable on 1 L of supplemental oxygen with oxygen saturation 95%.  Plan to wean supplemental oxygen and discharge within the next 24 hours.        6/27 Yesterday patient was able to be weaned off oxygen but became hypoxic overnight.  PT currently HD stable on 2-3 L nasal cannula.  CXR pending.  Respiratory exam with good air movement and no wheezing.  Will attempt to wean supplemental oxygen today.      6/28 Worsening hypoxia and now on 6 L.  Added vancomycin to IV zosyn.  Patient's last day of remdesivir for covid is today.  Discussed with pulmonology and will likely round on patient tomorrow.  Discussed plan of care with dad.      6/29 patient is still on 6 L nasal cannula but seems to be stable.  Her O2 sats are in the mid 90 percentile.  Patient is still on Decadron, vancomycin, Zosyn.  He seems to be getting  better.    6/30 patient now on 3 L nasal cannula.  He is looking much better.  He is starting to eat.  He makes good eye contact and tries to answers questions.  No sign of respiratory distress    Past Medical History:   Diagnosis Date    Down's syndrome     Seizure        History reviewed. No pertinent surgical history.    Review of patient's allergies indicates:   Allergen Reactions    Mayonnaise Blisters    Shellfish containing products      seafood       No current facility-administered medications on file prior to encounter.     Current Outpatient Medications on File Prior to Encounter   Medication Sig    allopurinoL (ZYLOPRIM) 300 MG tablet Take 1 tablet (300 mg total) by mouth once daily.    clopidogreL (PLAVIX) 75 mg tablet Take 1 tablet by mouth once daily    cyanocobalamin 500 MCG tablet Take 500 mcg by mouth once daily.    loratadine (CLARITIN) 10 mg tablet Take 1 tablet (10 mg total) by mouth once daily.    MULTIVITAMIN ORAL Take 1 tablet by mouth once daily. Centrum vitamin    omeprazole (PRILOSEC) 20 MG capsule Take 1 capsule (20 mg total) by mouth once daily.    rosuvastatin (CRESTOR) 10 MG tablet Take 10 mg by mouth once daily.    vitamin D 1000 units Tab Take 185 mg by mouth once daily.    mupirocin (BACTROBAN) 2 % ointment Apply topically 3 (three) times daily.     Family History    None       Tobacco Use    Smoking status: Never    Smokeless tobacco: Never   Substance and Sexual Activity    Alcohol use: No    Drug use: No    Sexual activity: Never     Review of Systems   Reason unable to perform ROS: patient not answering questions.     Objective:     Vital Signs (Most Recent):  Temp: 98.7 °F (37.1 °C) (06/30/24 0735)  Pulse: 99 (06/30/24 1000)  Resp: 18 (06/30/24 0735)  BP: (!) 129/92 (06/30/24 0735)  SpO2: 96 % (06/30/24 0735) Vital Signs (24h Range):  Temp:  [97.3 °F (36.3 °C)-98.7 °F (37.1 °C)] 98.7 °F (37.1 °C)  Pulse:  [] 99  Resp:  [18-20] 18  SpO2:  [90 %-98 %] 96 %  BP:  "(109-139)/(63-92) 129/92     Weight: 60 kg (132 lb 4.4 oz)  Body mass index is 27.65 kg/m².     Physical Exam  Constitutional:       General: He is not in acute distress.     Comments: Bent over in bed. Nursing staff stating this is his normal comfortable position    HENT:      Head: Normocephalic and atraumatic.   Eyes:      General:         Right eye: No discharge.         Left eye: No discharge.   Cardiovascular:      Rate and Rhythm: Normal rate and regular rhythm.   Pulmonary:      Effort: Pulmonary effort is normal. No respiratory distress.      Breath sounds: Normal breath sounds.   Abdominal:      General: Bowel sounds are normal. There is no distension (c).      Palpations: Abdomen is soft.      Tenderness: There is no abdominal tenderness.   Musculoskeletal:         General: No swelling or tenderness.      Cervical back: Neck supple. No tenderness.   Skin:     General: Skin is warm and dry.   Neurological:      General: No focal deficit present.      Mental Status: He is alert. Mental status is at baseline.   Psychiatric:         Mood and Affect: Mood normal.         Behavior: Behavior normal.                Significant Labs: A1C: No results for input(s): "HGBA1C" in the last 4320 hours.  ABGs: No results for input(s): "PH", "PCO2", "HCO3", "POCSATURATED", "BE", "TOTALHB", "COHB", "METHB", "O2HB", "POCFIO2", "PO2" in the last 48 hours.  Bilirubin:   Recent Labs   Lab 06/25/24  0433 06/26/24  0756 06/27/24  1116 06/28/24  0824 06/29/24  0426   BILITOT 0.5  0.5 0.7 0.6 0.7 0.5     Blood Culture:   No results for input(s): "LABBLOO" in the last 48 hours.    BMP:   Recent Labs   Lab 06/29/24  0426   *      K 3.8      CO2 23   BUN 19   CREATININE 1.0   CALCIUM 8.7     CBC:   Recent Labs   Lab 06/29/24  0426   WBC 21.38*   HGB 14.7   HCT 43.5        CMP:   Recent Labs   Lab 06/29/24  0426      K 3.8      CO2 23   *   BUN 19   CREATININE 1.0   CALCIUM 8.7   PROT 5.9* " "  ALBUMIN 2.4*   BILITOT 0.5   ALKPHOS 53*   AST 16   ALT 24   ANIONGAP 9     Cardiac Markers:   No results for input(s): "CKMB", "MYOGLOBIN", "BNP", "TROPISTAT" in the last 48 hours.      Coagulation: No results for input(s): "PT", "INR", "APTT" in the last 48 hours.  Lactic Acid:   No results for input(s): "LACTATE" in the last 48 hours.    Lipase: No results for input(s): "LIPASE" in the last 48 hours.  Lipid Panel: No results for input(s): "CHOL", "HDL", "LDLCALC", "TRIG", "CHOLHDL" in the last 48 hours.  Magnesium:   No results for input(s): "MG" in the last 48 hours.      POCT Glucose: No results for input(s): "POCTGLUCOSE" in the last 48 hours.  Prealbumin: No results for input(s): "PREALBUMIN" in the last 48 hours.  Respiratory Culture: No results for input(s): "GSRESP", "RESPIRATORYC" in the last 48 hours.  Troponin:   No results for input(s): "TROPONINI", "TROPONINIHS" in the last 48 hours.    TSH:   Recent Labs   Lab 02/20/24  0831   TSH 4.622*     Urine Culture: No results for input(s): "LABURIN" in the last 48 hours.  Urine Studies:   No results for input(s): "COLORU", "APPEARANCEUA", "PHUR", "SPECGRAV", "PROTEINUA", "GLUCUA", "KETONESU", "BILIRUBINUA", "OCCULTUA", "NITRITE", "UROBILINOGEN", "LEUKOCYTESUR", "RBCUA", "WBCUA", "BACTERIA", "SQUAMEPITHEL", "HYALINECASTS" in the last 48 hours.    Invalid input(s): "WRIGHTSUR"      Significant Imaging: I have reviewed all pertinent imaging results/findings within the past 24 hours.    Assessment/Plan:      * Sepsis due to pneumonia  This patient does have evidence of infective focus  My overall impression is sepsis.  Source: Respiratory  Antibiotics given-   Antibiotics (72h ago, onward)      Start     Stop Route Frequency Ordered    06/28/24 2200  vancomycin 750 mg in D5W 250 mL IVPB (Vial-Mate)         -- IV Every 12 hours (non-standard times) 06/28/24 1011    06/28/24 0905  vancomycin - pharmacy to dose  (vancomycin IVPB (PEDS and ADULTS))        Placed in " ""And" Linked Group    -- IV pharmacy to manage frequency 06/28/24 0805    06/26/24 0900  mupirocin 2 % ointment         07/01/24 0859 Nasl 2 times daily 06/26/24 0753    06/24/24 1900  piperacillin-tazobactam (ZOSYN) 4.5 g in D5W 100 mL IVPB (MB+)         -- IV Every 8 hours (non-standard times) 06/24/24 1355          Latest lactate reviewed-  Recent Labs   Lab 06/28/24  0824   LACTATE 1.6       Organ dysfunction indicated by pulmonary edema on CXR     Fluid challenge 2 L bolus     Post- resuscitation assessment No - Post resuscitation assessment not needed     CXR pulmonary edema, L lobe consolidation   Will Not start Pressors- Levophed for MAP of 65  Source control achieved by: IV zosyn, IV vancomycin  Blood cultures pending/ U/A negative   BNP, procal & troponin wnl     Improving.  Patient now on 3 L nasal cannula and getting better.    Hypoxia  Due to covid pneumonia/bacterial pneumonia  CXR with increased hazziness in LLQ but laterally rotated  Waiting radiology read.  If unable to wean off oxygen will consider CTA chest and further work up.      6/28: No PE on CTA chest.  Persistent left lobe PNA.  Added vancomycin to IV zosyn for pneumonia treatment.  Pulmonology notified.  Hopefully we can start weaning shortly.    6/30 getting better.  Now on 3 L nasal cannula      Abdominal distention  Intermittent  KUB pending  Last bowel movement 2-3 days ago per nursing staff    6/26 Improving.  KUB with Bowel gas pattern.  LBM 6/25.      resolved      Urinary retention  Jones catheter in place   Try to remove the Jones today      Pneumonia  Bilateral lobe hazziness L>R on CXR  Coverage for bacterial pneumonia although procal wnl.      Antibiotics (From admission, onward)      Start     Stop Route Frequency Ordered    06/28/24 2200  vancomycin 750 mg in D5W 250 mL IVPB (Vial-Mate)         -- IV Every 12 hours (non-standard times) 06/28/24 1011    06/28/24 0905  vancomycin - pharmacy to dose  (vancomycin IVPB (PEDS and " "ADULTS))        Placed in "And" Linked Group    -- IV pharmacy to manage frequency 06/28/24 0805    06/26/24 0900  mupirocin 2 % ointment         07/01/24 0859 Nasl 2 times daily 06/26/24 0753    06/24/24 1900  piperacillin-tazobactam (ZOSYN) 4.5 g in D5W 100 mL IVPB (MB+)         -- IV Every 8 hours (non-standard times) 06/24/24 1355            Microbiology Results (last 7 days)       Procedure Component Value Units Date/Time    Blood culture #1 **CANNOT BE ORDERED STAT** [[id number]] Collected: 06/24/24 1042    Order Status: Completed Specimen: Blood from Peripheral, Hand, Left Updated: 06/29/24 1612     Blood Culture, Routine No growth after 5 days.    Blood culture #2 **CANNOT BE ORDERED STAT** [[id number]] Collected: 06/24/24 1036    Order Status: Completed Specimen: Blood from Peripheral, Forearm, Left Updated: 06/29/24 1612     Blood Culture, Routine No growth after 5 days.    Culture, Respiratory with Gram Stain [[id number]]     Order Status: No result Specimen: Respiratory     Blood culture [[id number]]     Order Status: Canceled Specimen: Blood     Blood culture [[id number]]     Order Status: Canceled Specimen: Blood     Influenza A & B by Molecular [[id number]] Collected: 06/24/24 1026    Order Status: Completed Specimen: Nasopharyngeal Swab Updated: 06/24/24 1102     Influenza A, Molecular Negative     Influenza B, Molecular Negative     Flu A & B Source Nasal swab    Group A Strep, Molecular [[id number]] Collected: 06/24/24 1026    Order Status: Completed Specimen: Throat Updated: 06/24/24 1053     Group A Strep, Molecular Negative     Comment: Arcanobacterium haemolyticum and Beta Streptococcus group C   and G will not be detected by this test method.  Please order   Throat Culture ([id number]) if suspected.               Continued left lobe consolidation   Repeat chest x-ray today looks pretty good.    Elevated lactic acid level  Lactic acid 4.1 at presentation and now 2.8     6/25 Repeat lactic acid " yesterday trending up to 3.1.  Currently only IV fluids.  Repeat lactic acid pending.      6/26 Lactic acid today 1.6.       COVID-19  Patient is identified as Severe COVID-19 based on hypoxemia with O2 saturations <94% on room air or on ambulation   Initiate standard COVID protocols; COVID-19 testing ,Infection Control notification  and isolation- respiratory, contact and droplet per protocol    Diagnostics: CBC, CMP, Ferritin, CRP, and Portable CXR    Management: Maintain oxygen saturations 92-96% via currently on room air and monitor with continuous/intermittent pulse oximetry. , Inhaled bronchodilators as needed for shortness of breath., and Continuous cardiac monitoring.    Advance Care Planning Current advance care plan has not been discussed with patient. Attempted to contact father     Able to decrease oxygen to 3 L.  Continue albuterol.    Decadron discontinue  Patient completed remdesivir        GERD (gastroesophageal reflux disease)  Continue protonix       HLD (hyperlipidemia)  Continue statin       History of TIA (transient ischemic attack)  Continue atorvastatin and plavix      Gout  Continue allopurinol      Down syndrome  Received haloperidol in the ED   Currently calm, awake and answers yes/no        VTE Risk Mitigation (From admission, onward)      None            Discharge Planning   CASTILLO:      Code Status: Full Code   Is the patient medically ready for discharge?:     Reason for patient still in hospital (select all that apply): Patient trending condition and Treatment  Discharge Plan A: Home with family                  Tomasz Castillo MD  Department of Hospital Medicine   [facility] Surg (3rd Fl)

## 2024-06-30 NOTE — ASSESSMENT & PLAN NOTE
Patient is identified as Severe COVID-19 based on hypoxemia with O2 saturations <94% on room air or on ambulation   Initiate standard COVID protocols; COVID-19 testing ,Infection Control notification  and isolation- respiratory, contact and droplet per protocol    Diagnostics: CBC, CMP, Ferritin, CRP, and Portable CXR    Management: Maintain oxygen saturations 92-96% via currently on room air and monitor with continuous/intermittent pulse oximetry. , Inhaled bronchodilators as needed for shortness of breath., and Continuous cardiac monitoring.    Advance Care Planning  Current advance care plan has not been discussed with patient. Attempted to contact father     Able to decrease oxygen to 3 L.  Continue albuterol.    Decadron discontinue  Patient completed remdesivir

## 2024-07-01 VITALS
HEART RATE: 95 BPM | OXYGEN SATURATION: 96 % | TEMPERATURE: 98 F | SYSTOLIC BLOOD PRESSURE: 119 MMHG | BODY MASS INDEX: 25.97 KG/M2 | HEIGHT: 60 IN | RESPIRATION RATE: 18 BRPM | DIASTOLIC BLOOD PRESSURE: 77 MMHG | WEIGHT: 132.25 LBS

## 2024-07-01 DIAGNOSIS — U07.1 COVID-19 VIRUS DETECTED: ICD-10-CM

## 2024-07-01 LAB
AV INDEX (PROSTH): 0.79
AV MEAN GRADIENT: 2 MMHG
AV PEAK GRADIENT: 3 MMHG
AV VALVE AREA BY VELOCITY RATIO: 2.03 CM²
AV VALVE AREA: 2.25 CM²
AV VELOCITY RATIO: 0.72
BSA FOR ECHO PROCEDURE: 1.57 M2
CV ECHO LV RWT: 0.3 CM
DOP CALC AO PEAK VEL: 0.88 M/S
DOP CALC AO VTI: 13.6 CM
DOP CALC LVOT AREA: 2.8 CM2
DOP CALC LVOT DIAMETER: 1.9 CM
DOP CALC LVOT PEAK VEL: 0.63 M/S
DOP CALC LVOT STROKE VOLUME: 30.61 CM3
DOP CALC MV VTI: 21 CM
DOP CALCLVOT PEAK VEL VTI: 10.8 CM
E WAVE DECELERATION TIME: 209.63 MSEC
E/A RATIO: 0.75
E/E' RATIO: 11 M/S
ECHO LV POSTERIOR WALL: 0.52 CM (ref 0.6–1.1)
FRACTIONAL SHORTENING: 17 % (ref 28–44)
INTERVENTRICULAR SEPTUM: 0.61 CM (ref 0.6–1.1)
IVC DIAMETER: 0.98 CM
LA MAJOR: 3.8 CM
LEFT ATRIUM SIZE: 2.6 CM
LEFT INTERNAL DIMENSION IN SYSTOLE: 2.93 CM (ref 2.1–4)
LEFT VENTRICLE DIASTOLIC VOLUME INDEX: 33.67 ML/M2
LEFT VENTRICLE DIASTOLIC VOLUME: 51.51 ML
LEFT VENTRICLE MASS INDEX: 31 G/M2
LEFT VENTRICLE SYSTOLIC VOLUME INDEX: 21.6 ML/M2
LEFT VENTRICLE SYSTOLIC VOLUME: 33.08 ML
LEFT VENTRICULAR INTERNAL DIMENSION IN DIASTOLE: 3.52 CM (ref 3.5–6)
LEFT VENTRICULAR MASS: 47.97 G
LV LATERAL E/E' RATIO: 13.75 M/S
LV SEPTAL E/E' RATIO: 9.17 M/S
LVED V (TEICH): 51.51 ML
LVES V (TEICH): 33.08 ML
LVOT MG: 0.79 MMHG
LVOT MV: 0.42 CM/S
MV MEAN GRADIENT: 1 MMHG
MV PEAK A VEL: 0.73 M/S
MV PEAK E VEL: 0.55 M/S
MV PEAK GRADIENT: 3 MMHG
MV VALVE AREA BY CONTINUITY EQUATION: 1.46 CM2
OHS CV RV/LV RATIO: 0.43 CM
PULM VEIN S/D RATIO: 1.4
PV PEAK D VEL: 0.25 M/S
PV PEAK GRADIENT: 4 MMHG
PV PEAK S VEL: 0.35 M/S
PV PEAK VELOCITY: 1.06 M/S
RA MAJOR: 3.49 CM
RA PRESSURE ESTIMATED: 3 MMHG
RIGHT VENTRICULAR END-DIASTOLIC DIMENSION: 1.52 CM
RV TISSUE DOPPLER FREE WALL SYSTOLIC VELOCITY 1 (APICAL 4 CHAMBER VIEW): 10.39 CM/S
TDI LATERAL: 0.04 M/S
TDI SEPTAL: 0.06 M/S
TDI: 0.05 M/S
TRICUSPID ANNULAR PLANE SYSTOLIC EXCURSION: 1.57 CM
VANCOMYCIN TROUGH SERPL-MCNC: 15.4 UG/ML (ref 10–22)
Z-SCORE OF LEFT VENTRICULAR DIMENSION IN END DIASTOLE: -2.37
Z-SCORE OF LEFT VENTRICULAR DIMENSION IN END SYSTOLE: 0.44

## 2024-07-01 PROCEDURE — 94640 AIRWAY INHALATION TREATMENT: CPT

## 2024-07-01 PROCEDURE — 25000003 PHARM REV CODE 250: Performed by: INTERNAL MEDICINE

## 2024-07-01 PROCEDURE — 36415 COLL VENOUS BLD VENIPUNCTURE: CPT | Performed by: FAMILY MEDICINE

## 2024-07-01 PROCEDURE — 94761 N-INVAS EAR/PLS OXIMETRY MLT: CPT

## 2024-07-01 PROCEDURE — 25000003 PHARM REV CODE 250: Performed by: PHYSICIAN ASSISTANT

## 2024-07-01 PROCEDURE — 97116 GAIT TRAINING THERAPY: CPT

## 2024-07-01 PROCEDURE — 99900035 HC TECH TIME PER 15 MIN (STAT)

## 2024-07-01 PROCEDURE — 97530 THERAPEUTIC ACTIVITIES: CPT

## 2024-07-01 PROCEDURE — 63600175 PHARM REV CODE 636 W HCPCS: Performed by: INTERNAL MEDICINE

## 2024-07-01 PROCEDURE — 63600175 PHARM REV CODE 636 W HCPCS: Performed by: PHYSICIAN ASSISTANT

## 2024-07-01 PROCEDURE — 94799 UNLISTED PULMONARY SVC/PX: CPT

## 2024-07-01 PROCEDURE — 97165 OT EVAL LOW COMPLEX 30 MIN: CPT

## 2024-07-01 PROCEDURE — 80202 ASSAY OF VANCOMYCIN: CPT | Performed by: FAMILY MEDICINE

## 2024-07-01 PROCEDURE — 99238 HOSP IP/OBS DSCHRG MGMT 30/<: CPT | Mod: ,,, | Performed by: PHYSICIAN ASSISTANT

## 2024-07-01 RX ORDER — LACTOBACILLUS RHAMNOSUS GG 10B CELL
1 CAPSULE ORAL DAILY
Qty: 15 CAPSULE | Refills: 0 | Status: SHIPPED | OUTPATIENT
Start: 2024-07-01 | End: 2024-07-16

## 2024-07-01 RX ORDER — ALBUTEROL SULFATE 90 UG/1
2 AEROSOL, METERED RESPIRATORY (INHALATION) EVERY 6 HOURS PRN
Qty: 18 G | Refills: 0 | Status: SHIPPED | OUTPATIENT
Start: 2024-07-01 | End: 2025-07-01

## 2024-07-01 RX ORDER — DOXYCYCLINE 100 MG/1
100 CAPSULE ORAL EVERY 12 HOURS
Qty: 10 CAPSULE | Refills: 0 | Status: SHIPPED | OUTPATIENT
Start: 2024-07-01 | End: 2024-07-06

## 2024-07-01 RX ORDER — GUAIFENESIN 600 MG/1
1200 TABLET, EXTENDED RELEASE ORAL 2 TIMES DAILY PRN
Qty: 20 TABLET | Refills: 0 | Status: SHIPPED | OUTPATIENT
Start: 2024-07-01 | End: 2024-07-06

## 2024-07-01 RX ORDER — TAMSULOSIN HYDROCHLORIDE 0.4 MG/1
0.4 CAPSULE ORAL NIGHTLY
Qty: 30 CAPSULE | Refills: 0 | Status: SHIPPED | OUTPATIENT
Start: 2024-07-01 | End: 2025-07-01

## 2024-07-01 RX ORDER — CHOLECALCIFEROL (VITAMIN D3) 25 MCG
1000 TABLET ORAL DAILY
Start: 2024-07-01

## 2024-07-01 RX ADMIN — CLOPIDOGREL BISULFATE 75 MG: 75 TABLET ORAL at 08:07

## 2024-07-01 RX ADMIN — PANTOPRAZOLE SODIUM 40 MG: 40 INJECTION, POWDER, FOR SOLUTION INTRAVENOUS at 08:07

## 2024-07-01 RX ADMIN — SODIUM CHLORIDE: 9 INJECTION, SOLUTION INTRAVENOUS at 04:07

## 2024-07-01 RX ADMIN — PIPERACILLIN SODIUM AND TAZOBACTAM SODIUM 4.5 G: 4; .5 INJECTION, POWDER, LYOPHILIZED, FOR SOLUTION INTRAVENOUS at 02:07

## 2024-07-01 RX ADMIN — VANCOMYCIN HYDROCHLORIDE 750 MG: 750 INJECTION, POWDER, LYOPHILIZED, FOR SOLUTION INTRAVENOUS at 10:07

## 2024-07-01 RX ADMIN — ALLOPURINOL 300 MG: 100 TABLET ORAL at 08:07

## 2024-07-01 RX ADMIN — GUAIFENESIN 600 MG: 600 TABLET, EXTENDED RELEASE ORAL at 08:07

## 2024-07-01 RX ADMIN — Medication 1000 UNITS: at 08:07

## 2024-07-01 RX ADMIN — ALBUTEROL SULFATE 2 PUFF: 90 AEROSOL, METERED RESPIRATORY (INHALATION) at 01:07

## 2024-07-01 RX ADMIN — DEXAMETHASONE SODIUM PHOSPHATE 6 MG: 4 INJECTION, SOLUTION INTRA-ARTICULAR; INTRALESIONAL; INTRAMUSCULAR; INTRAVENOUS; SOFT TISSUE at 08:07

## 2024-07-01 RX ADMIN — ALBUTEROL SULFATE 2 PUFF: 90 AEROSOL, METERED RESPIRATORY (INHALATION) at 07:07

## 2024-07-01 NOTE — PROGRESS NOTES
[facility] ([facility])  [facility]  Progress Note    Patient Name: Janes Strange  MRN: [id number]  Patient Class: IP- Inpatient   Admission Date: 6/24/2024  Length of Stay: 6 days  Attending Physician: Tomasz Castillo MD  Primary Care Provider: Luna Kelly NP        Subjective:     Principal Problem:Sepsis due to pneumonia        HPI:  Patient is a 54 year old male with medical history of Down's Syndrome, HLD, TIA and chronic cough who presented to the ED with cough and congestion for 2 weeks.  Patient currently not answering and history obtained from chart review.  Attempted to call father but is currently at eye doctor appointment.        Admitted for sepsis secondary to covid & bacterial pneumonia           Overview/Hospital Course:  PT admitted for sepsis secondary to covid pneumonia.  Hypoxia occurs when sleeping and will wean supplemental oxygen.  CXR pending.  Jones catheter in place due to urinary retention.  U/A negative.  Abdomen distended at times but then relaxes.  KUB pending.  Lactic acid elevated yesterday.  Will get repeat lactic acid this morning.      6/26: PT HD stable on 1 L of supplemental oxygen with oxygen saturation 95%.  Plan to wean supplemental oxygen and discharge within the next 24 hours.        6/27 Yesterday patient was able to be weaned off oxygen but became hypoxic overnight.  PT currently HD stable on 2-3 L nasal cannula.  CXR pending.  Respiratory exam with good air movement and no wheezing.  Will attempt to wean supplemental oxygen today.      6/28 Worsening hypoxia and now on 6 L.  Added vancomycin to IV zosyn.  Patient's last day of remdesivir for covid is today.  Discussed with pulmonology and will likely round on patient tomorrow.  Discussed plan of care with dad.      6/29 patient is still on 6 L nasal cannula but seems to be stable.  Her O2 sats are in the mid 90 percentile.  Patient is still on Decadron, vancomycin, Zosyn.  He seems to be getting  better.    6/30 patient now on 3 L nasal cannula.  He is looking much better.  He is starting to eat.  He makes good eye contact and tries to answers questions.  No sign of respiratory distress      7/1 PT now on room air and ambulatory oxygen saturation with PT 94%.  Will discharge with home health.  Course of doxycycline ordered for MRSA coverage.      Past Medical History:   Diagnosis Date    Down's syndrome     Seizure        History reviewed. No pertinent surgical history.    Review of patient's allergies indicates:   Allergen Reactions    Mayonnaise Blisters    Shellfish containing products      seafood       No current facility-administered medications on file prior to encounter.     Current Outpatient Medications on File Prior to Encounter   Medication Sig    allopurinoL (ZYLOPRIM) 300 MG tablet Take 1 tablet (300 mg total) by mouth once daily.    clopidogreL (PLAVIX) 75 mg tablet Take 1 tablet by mouth once daily    cyanocobalamin 500 MCG tablet Take 500 mcg by mouth once daily.    loratadine (CLARITIN) 10 mg tablet Take 1 tablet (10 mg total) by mouth once daily.    MULTIVITAMIN ORAL Take 1 tablet by mouth once daily. Centrum vitamin    omeprazole (PRILOSEC) 20 MG capsule Take 1 capsule (20 mg total) by mouth once daily.    rosuvastatin (CRESTOR) 10 MG tablet Take 10 mg by mouth once daily.    vitamin D 1000 units Tab Take 185 mg by mouth once daily.    mupirocin (BACTROBAN) 2 % ointment Apply topically 3 (three) times daily.     Family History    None       Tobacco Use    Smoking status: Never    Smokeless tobacco: Never   Substance and Sexual Activity    Alcohol use: No    Drug use: No    Sexual activity: Never     Review of Systems   Reason unable to perform ROS: no acute complaints.     Objective:     Vital Signs (Most Recent):  Temp: (!) 95.8 °F (35.4 °C) (07/01/24 0745)  Pulse: 106 (07/01/24 1000)  Resp: 19 (07/01/24 0745)  BP: 116/74 (07/01/24 0745)  SpO2: (!) 93 % (07/01/24 0745) Vital Signs (24h  "Range):  Temp:  [95.8 °F (35.4 °C)-98.2 °F (36.8 °C)] 95.8 °F (35.4 °C)  Pulse:  [] 106  Resp:  [16-22] 19  SpO2:  [92 %-98 %] 93 %  BP: (107-141)/(68-91) 116/74     Weight: 60 kg (132 lb 4.4 oz)  Body mass index is 27.65 kg/m².     Physical Exam  Constitutional:       General: He is not in acute distress.  HENT:      Head: Normocephalic and atraumatic.   Eyes:      General:         Right eye: No discharge.         Left eye: No discharge.   Cardiovascular:      Rate and Rhythm: Normal rate and regular rhythm.   Pulmonary:      Effort: Pulmonary effort is normal. No respiratory distress.      Breath sounds: Normal breath sounds.   Abdominal:      General: Bowel sounds are normal. There is no distension.      Palpations: Abdomen is soft.      Tenderness: There is no abdominal tenderness.   Musculoskeletal:         General: No swelling or tenderness.      Cervical back: Neck supple. No tenderness.   Skin:     General: Skin is warm and dry.   Neurological:      General: No focal deficit present.      Mental Status: He is alert. Mental status is at baseline.   Psychiatric:         Mood and Affect: Mood normal.         Behavior: Behavior normal.                Significant Labs: A1C: No results for input(s): "HGBA1C" in the last 4320 hours.  ABGs: No results for input(s): "PH", "PCO2", "HCO3", "POCSATURATED", "BE", "TOTALHB", "COHB", "METHB", "O2HB", "POCFIO2", "PO2" in the last 48 hours.  Bilirubin:   Recent Labs   Lab 06/26/24  0756 06/27/24  1116 06/28/24  0824 06/29/24  0426 06/30/24  1106   BILITOT 0.7 0.6 0.7 0.5 0.4     Blood Culture:   No results for input(s): "LABBLOO" in the last 48 hours.    BMP:   Recent Labs   Lab 06/30/24  1106   *      K 3.8      CO2 22*   BUN 22*   CREATININE 0.9   CALCIUM 9.2     CBC:   Recent Labs   Lab 06/30/24  1106   WBC 18.53*   HGB 15.9   HCT 45.9        CMP:   Recent Labs   Lab 06/30/24  1106      K 3.8      CO2 22*   *   BUN 22* " "  CREATININE 0.9   CALCIUM 9.2   PROT 6.3   ALBUMIN 2.6*   BILITOT 0.4   ALKPHOS 62   AST 17   ALT 22   ANIONGAP 10     Cardiac Markers:   No results for input(s): "CKMB", "MYOGLOBIN", "BNP", "TROPISTAT" in the last 48 hours.      Coagulation: No results for input(s): "PT", "INR", "APTT" in the last 48 hours.  Lactic Acid:   No results for input(s): "LACTATE" in the last 48 hours.    Lipase: No results for input(s): "LIPASE" in the last 48 hours.  Lipid Panel: No results for input(s): "CHOL", "HDL", "LDLCALC", "TRIG", "CHOLHDL" in the last 48 hours.  Magnesium:   No results for input(s): "MG" in the last 48 hours.      POCT Glucose: No results for input(s): "POCTGLUCOSE" in the last 48 hours.  Prealbumin: No results for input(s): "PREALBUMIN" in the last 48 hours.  Respiratory Culture: No results for input(s): "GSRESP", "RESPIRATORYC" in the last 48 hours.  Troponin:   No results for input(s): "TROPONINI", "TROPONINIHS" in the last 48 hours.    TSH:   Recent Labs   Lab 02/20/24  0831   TSH 4.622*     Urine Culture: No results for input(s): "LABURIN" in the last 48 hours.  Urine Studies:   No results for input(s): "COLORU", "APPEARANCEUA", "PHUR", "SPECGRAV", "PROTEINUA", "GLUCUA", "KETONESU", "BILIRUBINUA", "OCCULTUA", "NITRITE", "UROBILINOGEN", "LEUKOCYTESUR", "RBCUA", "WBCUA", "BACTERIA", "SQUAMEPITHEL", "HYALINECASTS" in the last 48 hours.    Invalid input(s): "WRIGHTSUR"      Significant Imaging: I have reviewed all pertinent imaging results/findings within the past 24 hours.    Assessment/Plan:      * Sepsis due to pneumonia  This patient does have evidence of infective focus  My overall impression is sepsis.  Source: Respiratory  Antibiotics given-   Antibiotics (72h ago, onward)      Start     Stop Route Frequency Ordered    06/28/24 2200  vancomycin 750 mg in D5W 250 mL IVPB (Vial-Mate)         -- IV Every 12 hours (non-standard times) 06/28/24 1011    06/28/24 0905  vancomycin - pharmacy to dose  " "(vancomycin IVPB (PEDS and ADULTS))        Placed in "And" Linked Group    -- IV pharmacy to manage frequency 06/28/24 0805    06/24/24 1900  piperacillin-tazobactam (ZOSYN) 4.5 g in D5W 100 mL IVPB (MB+)         -- IV Every 8 hours (non-standard times) 06/24/24 1355          Latest lactate reviewed-  No results for input(s): "LACTATE", "POCLAC" in the last 72 hours.    Organ dysfunction indicated by pulmonary edema on CXR     Fluid challenge 2 L bolus     Post- resuscitation assessment No - Post resuscitation assessment not needed     CXR pulmonary edema, L lobe consolidation   Will Not start Pressors- Levophed for MAP of 65  Source control achieved by: IV zosyn, IV vancomycin  Blood cultures pending/ U/A negative   BNP, procal & troponin wnl     Improving.  Patient now on 3 L nasal cannula and getting better.    Resolved.      Hypoxia  Due to covid pneumonia/bacterial pneumonia  CXR with increased hazziness in LLQ but laterally rotated  Waiting radiology read.  If unable to wean off oxygen will consider CTA chest and further work up.      6/28: No PE on CTA chest.  Persistent left lobe PNA.  Added vancomycin to IV zosyn for pneumonia treatment.  Pulmonology notified.  Hopefully we can start weaning shortly.    6/30 getting better.  Now on 3 L nasal cannula      7/1 Resolved     Abdominal distention  Intermittent  KUB pending  Last bowel movement 2-3 days ago per nursing staff    6/26 Improving.  KUB with Bowel gas pattern.  LBM 6/25.      resolved      Urinary retention  Jones catheter in place   Try to remove the Jones today    Resolved.  Jones catheter removed.        Pneumonia  Bilateral lobe hazziness L>R on CXR  Coverage for bacterial pneumonia although procal wnl.      Antibiotics (From admission, onward)      Start     Stop Route Frequency Ordered    06/28/24 2200  vancomycin 750 mg in D5W 250 mL IVPB (Vial-Mate)         -- IV Every 12 hours (non-standard times) 06/28/24 1011    06/28/24 0905  vancomycin - " "pharmacy to dose  (vancomycin IVPB (PEDS and ADULTS))        Placed in "And" Linked Group    -- IV pharmacy to manage frequency 06/28/24 0805    06/24/24 1900  piperacillin-tazobactam (ZOSYN) 4.5 g in D5W 100 mL IVPB (MB+)         -- IV Every 8 hours (non-standard times) 06/24/24 1355            Microbiology Results (last 7 days)       Procedure Component Value Units Date/Time    Blood culture #1 **CANNOT BE ORDERED STAT** [[id number]] Collected: 06/24/24 1042    Order Status: Completed Specimen: Blood from Peripheral, Hand, Left Updated: 06/29/24 1612     Blood Culture, Routine No growth after 5 days.    Blood culture #2 **CANNOT BE ORDERED STAT** [[id number]] Collected: 06/24/24 1036    Order Status: Completed Specimen: Blood from Peripheral, Forearm, Left Updated: 06/29/24 1612     Blood Culture, Routine No growth after 5 days.    Culture, Respiratory with Gram Stain [[id number]]     Order Status: No result Specimen: Respiratory     Blood culture [[id number]]     Order Status: Canceled Specimen: Blood     Blood culture [[id number]]     Order Status: Canceled Specimen: Blood     Influenza A & B by Molecular [[id number]] Collected: 06/24/24 1026    Order Status: Completed Specimen: Nasopharyngeal Swab Updated: 06/24/24 1102     Influenza A, Molecular Negative     Influenza B, Molecular Negative     Flu A & B Source Nasal swab    Group A Strep, Molecular [[id number]] Collected: 06/24/24 1026    Order Status: Completed Specimen: Throat Updated: 06/24/24 1053     Group A Strep, Molecular Negative     Comment: Arcanobacterium haemolyticum and Beta Streptococcus group C   and G will not be detected by this test method.  Please order   Throat Culture ([id number]) if suspected.               Continued left lobe consolidation   Repeat chest x-ray today looks pretty good.    7/1 Now on room air.  Will discharge with course of doxycycline.     Elevated lactic acid level  Lactic acid 4.1 at presentation and now 2.8     6/25 " Repeat lactic acid yesterday trending up to 3.1.  Currently only IV fluids.  Repeat lactic acid pending.      6/26 Lactic acid today 1.6.       COVID-19  Patient is identified as Severe COVID-19 based on hypoxemia with O2 saturations <94% on room air or on ambulation   Initiate standard COVID protocols; COVID-19 testing ,Infection Control notification  and isolation- respiratory, contact and droplet per protocol    Diagnostics: CBC, CMP, Ferritin, CRP, and Portable CXR    Management: Maintain oxygen saturations 92-96% via currently on room air and monitor with continuous/intermittent pulse oximetry. , Inhaled bronchodilators as needed for shortness of breath., and Continuous cardiac monitoring.    Advance Care Planning Current advance care plan has not been discussed with patient. Attempted to contact father     Able to decrease oxygen to 3 L.  Continue albuterol.    Decadron discontinue  Patient completed remdesivir        GERD (gastroesophageal reflux disease)  Continue protonix       HLD (hyperlipidemia)  Continue statin       History of TIA (transient ischemic attack)  Continue atorvastatin and plavix      Gout  Continue allopurinol      Down syndrome  Received haloperidol in the ED   Currently calm, awake and answers yes/no        VTE Risk Mitigation (From admission, onward)      None            Discharge Planning   CASTILLO:      Code Status: Full Code   Is the patient medically ready for discharge?:     Reason for patient still in hospital (select all that apply): Other (specify) discharge home with home health today   Discharge Plan A: Home with family                  Desire Skaggs PA-C  Department of Hospital Medicine   [facility] [employer] [facility] [employer] [facility] Surg (3rd Fl)

## 2024-07-01 NOTE — ASSESSMENT & PLAN NOTE
"This patient does have evidence of infective focus  My overall impression is sepsis.  Source: Respiratory  Antibiotics given-   Antibiotics (72h ago, onward)      Start     Stop Route Frequency Ordered    06/28/24 2200  vancomycin 750 mg in D5W 250 mL IVPB (Vial-Mate)         -- IV Every 12 hours (non-standard times) 06/28/24 1011    06/28/24 0905  vancomycin - pharmacy to dose  (vancomycin IVPB (PEDS and ADULTS))        Placed in "And" Linked Group    -- IV pharmacy to manage frequency 06/28/24 0805    06/24/24 1900  piperacillin-tazobactam (ZOSYN) 4.5 g in D5W 100 mL IVPB (MB+)         -- IV Every 8 hours (non-standard times) 06/24/24 1355          Latest lactate reviewed-  No results for input(s): "LACTATE", "POCLAC" in the last 72 hours.    Organ dysfunction indicated by pulmonary edema on CXR     Fluid challenge 2 L bolus     Post- resuscitation assessment No - Post resuscitation assessment not needed     CXR pulmonary edema, L lobe consolidation   Will Not start Pressors- Levophed for MAP of 65  Source control achieved by: IV zosyn, IV vancomycin  Blood cultures pending/ U/A negative   BNP, procal & troponin wnl     Improving.  Patient now on 3 L nasal cannula and getting better.    Resolved.    "

## 2024-07-01 NOTE — PT/OT/SLP DISCHARGE
Physical Therapy Discharge Summary    Name: Janes Strange  MRN: [id number]   Principal Problem: Sepsis due to pneumonia     Patient Discharged from acute Physical Therapy on 24.  Please refer to prior PT noted date on 24 for functional status.     Assessment:     Patient appropriate for care in another setting.    Objective:     GOALS:   Multidisciplinary Problems       Physical Therapy Goals       Not on file              Multidisciplinary Problems (Resolved)          Problem: Physical Therapy    Goal Priority Disciplines Outcome Goal Variances Interventions   Physical Therapy Goal   (Resolved)     PT, PT/OT Met     Description: Patient will increase functional independence with mobility by performin. Pt able to participate and tolerate ROM ex on B LE x 10 reps on each to prevent joint stiffness and prevent decline in functions.  2. Educate caregiver/family the proper ROM ex  on B LE with good understanding and carry over.  3. Pt able to participate and perform out of bed activity with minimal assistance.                            Reasons for Discontinuation of Therapy Services  Transfer to alternate level of care.      Plan:     Patient Discharged to: Home with Home Health Service.      2024

## 2024-07-01 NOTE — PT/OT/SLP PROGRESS
"Physical Therapy Treatment    Patient Name:  Janes Strange   MRN:  [id number]    Recommendations:     Discharge Recommendations: Moderate Intensity Therapy (Home Health PT)  Discharge Equipment Recommendations: walker, rolling  Barriers to discharge: Decreased caregiver support    Assessment:     Janes Strange is a 54 y.o. male admitted with a medical diagnosis of Sepsis due to pneumonia.  He presents with the following impairments/functional limitations: weakness, impaired endurance, impaired self care skills, impaired functional mobility, gait instability, impaired cognition, impaired balance, decreased safety awareness, decreased lower extremity function Patient is responding well during out of bed activity and desires to use to use the bathroom. Patient participated well during transitional movement and gait functions using RW x ~100 feet with  close supervision No sign of distress and no sign of fatigue. SPO2 base line 92 % and bump up to 94% after walk. Patient has shown significant improvement with functional mobility today.    Rehab Prognosis: Good; patient would benefit from acute skilled PT services to address these deficits and reach maximum level of function.    Recent Surgery: * No surgery found *      Plan:     During this hospitalization, patient to be seen 5 x/week to address the identified rehab impairments via gait training, therapeutic activities, therapeutic exercises and progress toward the following goals:    Plan of Care Expires:  07/02/24    Subjective     Chief Complaint: Pt wants to use the bathroom  Patient/Family Comments/goals: "To return home"  Pain/Comfort:  Pain Rating 1: 0/10      Objective:     Communicated with patient and family prior to session.  Patient found HOB elevated with bed alarm, peripheral IV, telemetry, PureWick upon PT entry to room.     General Precautions: Standard, airborne, contact, droplet, fall  Orthopedic Precautions: N/A  Braces: N/A  Respiratory Status: Room air   "   Functional Mobility:  Bed Mobility:     Rolling Left:  supervision  Rolling Right: supervision  Scooting: supervision  Supine to Sit: supervision  Sit to Supine: supervision  Transfers:     Sit to Stand:  supervision with rolling walker  Bed to Chair: supervision with  rolling walker  using  Step Transfer  Gait: Supervision x ~100 feet using RW.   Balance: Sitting: Independent; Standing with RW: Standby Assistance.      AM-PAC 6 CLICK MOBILITY  Turning over in bed (including adjusting bedclothes, sheets and blankets)?: 4  Sitting down on and standing up from a chair with arms (e.g., wheelchair, bedside commode, etc.): 4  Moving from lying on back to sitting on the side of the bed?: 4  Moving to and from a bed to a chair (including a wheelchair)?: 3  Need to walk in hospital room?: 3  Climbing 3-5 steps with a railing?: 3  Basic Mobility Total Score: 21       Treatment & Education:  ROM ex on B [place] x 10 reps on each plane at supine, body sequence in out of bed activity, step transfers, sitting activity and gait trng using RW x~100 feet with supervision.    Patient left up in chair with all lines intact, call button in reach, nurse  notified, and family and medical team present..    GOALS:   Multidisciplinary Problems       Physical Therapy Goals       Not on file              Multidisciplinary Problems (Resolved)          Problem: Physical Therapy    Goal Priority Disciplines Outcome Goal Variances Interventions   Physical Therapy Goal   (Resolved)     PT, PT/OT Met     Description: Patient will increase functional independence with mobility by performin. Pt able to participate and tolerate ROM ex on B LE x 10 reps on each to prevent joint stiffness and prevent decline in functions.  2. Educate caregiver/family the proper ROM ex  on B LE with good understanding and carry over.  3. Pt able to participate and perform out of bed activity with minimal assistance.                            Time Tracking:     PT  Received On: 07/01/24  PT Start Time: 0935     PT Stop Time: 1010  PT Total Time (min): 35 min     Billable Minutes: Gait Training 15 and Therapeutic Activity 20    Treatment Type: Treatment  PT/PTA: PT           07/01/2024

## 2024-07-01 NOTE — PT/OT/SLP EVAL
Occupational Therapy   Evaluation and Discharge Note      Name: Janes Strange  MRN: [id number]  Admitting Diagnosis: Sepsis due to pneumonia  Recent Surgery: * No surgery found *      Recommendations:     Discharge Recommendations: Moderate Intensity Therapy (Home health PT)  Discharge Equipment Recommendations:  walker, rolling  Barriers to discharge:  Inaccessible home environment    Assessment:     Janes Strange is a 54 y.o. male with a medical diagnosis of Sepsis due to pneumonia. At this time, patient is functioning at their prior low level of function for ADL per his father and does not require further acute OT services.     Rehab Prognosis: Fair; patient would benefit from acute skilled OT services to address these deficits and reach maximum level of function.       Plan:     Patient to be seen 5 x/week to address the above listed problems via self-care/home management, therapeutic activities, therapeutic exercises  Plan of Care Expires: 07/15/24  Plan of Care Reviewed with: patient, father    Subjective     Chief Complaint: None reported   Patient/Family Comments/goals: None reported     Occupational Profile:  Living Environment: Pt lives in [facility] with ramp to enter with father.  Previous level of function: Father reports MOD I with most ADL with MAX A for showering.  Equipment Used at Home: grab bar, bedside commode, shower chair  Assistance upon Discharge: father    Pain/Comfort:  Pain Rating 1: 0/10  Pain Rating Post-Intervention 1: 0/10    Patients cultural, spiritual, Yazidi conflicts given the current situation: no    Objective:     Communicated with: Nursing prior to session.  Patient found HOB elevated with peripheral IV, bed alarm, telemetry, PureWick upon OT entry to room.    General Precautions: Standard, airborne, contact, droplet, fall  Orthopedic Precautions: N/A  Braces: N/A  Respiratory Status: Room air    Occupational Performance:    Bed Mobility:    Patient completed Rolling/Turning to Right  with supervision  Patient completed Scooting/Bridging with supervision  Patient completed Supine to Sit with supervision  Patient completed Sit to Supine with supervision    Functional Mobility/Transfers:  Patient completed Sit <> Stand Transfer with stand by assistance  with  hand-held assist   Patient completed Toilet Transfer Step Transfer technique with contact guard assistance with  hand-held assist and grab bars  Functional Mobility: Pt ambulated ~15 feet to and from bathroom with hand held assist and no AD. PT demonstrating unsteadiness on feet.     Activities of Daily Living:  Grooming: stand by assistance to perform face and hand washing at sink level. Pt refusing oral care.   Upper Body Dressing: minimum assistance to don/doff gown seated EOB.   Lower Body Dressing: dependence to don socks seated EOB.    Cognitive/Visual Perceptual:  Cognitive/Psychosocial Skills:     -       Oriented to: Person   -       Follows Commands/attention:Follows one-step commands  -       Communication: dysarthria  -       Memory: Impaired LTM and Poor immediate recall  -       Safety awareness/insight to disability: impaired     Physical Exam:  Balance:    -       Fair- standing dynamic balance noted with unsteady gait without RW use.   Upper Extremity Range of Motion:     -       Right Upper Extremity: WFL  -       Left Upper Extremity: WFL  Upper Extremity Strength:    -       Right Upper Extremity: Unable to accurately assess due to PT difficulty following instruction.  -       Left Upper Extremity: Unable to accurately assess due to Pt difficulty following instructions.    Strength:    -       Right Upper Extremity: Unable to accurately assess due to PT difficulty following instruction.   -       Left Upper Extremity: Unable to accurately assess due to Pt difficulty following instruction.     AMPAC 6 Click ADL:  AMPAC Total Score: 17    Treatment & Education:  OT evaluation completed with Pt and father.     Patient left  HOB elevated with all lines intact, call button in reach, bed alarm on, and father present    GOALS:   Multidisciplinary Problems       Occupational Therapy Goals       Not on file                    History:     Past Medical History:   Diagnosis Date    Down's syndrome     Seizure        History reviewed. No pertinent surgical history.    Time Tracking:     OT Date of Treatment:    OT Start Time: 1347  OT Stop Time: 1407  OT Total Time (min): 20 min    Billable Minutes:Evaluation 20

## 2024-07-01 NOTE — PLAN OF CARE
07/01/24 1429   Post-Acute Status   Hospital Resources/Appts/Education Provided Appointments scheduled and added to AVS     .   Follow-up Information       Luna Kelly NP Follow up on 7/3/2024.    Specialty: Family Medicine  Why: hospital follow up  @11:40  Contact information:  [address] [facility] [address]  [phone]

## 2024-07-01 NOTE — PLAN OF CARE
[facility] - Med Surg (3rd Fl)  Discharge Final Note    Primary Care Provider: Luna Kelly NP    Expected Discharge Date: 7/1/2024    Final Discharge Note (most recent)       Final Note - 07/01/24 1530          Final Note    Assessment Type Final Discharge Note (P)      Anticipated Discharge Disposition Home-Health Care Svc (P)         Post-Acute Status    Post-Acute Authorization HME;Home Health (P)      Home Health Status Set-up Complete/Auth obtained (P)      Discharge Delays None known at this time (P)                      Important Message from Medicare  Important Message from Medicare regarding Discharge Appeal Rights: Given to patient/caregiver, Explained to patient/caregiver, Signed/date by patient/caregiver     Date IMM was signed: 07/01/24  Time IMM was signed: 1000    Contact Info       Luna Kelly NP   Specialty: Family Medicine   Relationship: PCP - General    [address] [place] [id number]   Phone: [phone]       Next Steps: Follow up on 7/3/2024    Instructions: hospital follow up  @11:40          Patient will discharge home today with [facility]. Patient/Family wanted rolling walker and wheelchair. Patient already has rollator, so insurance will not cover the cost of rolling walker. Patient ambulates well with walker and therapy recommends patient to use walker, so insurance will not cover the cost of a wheelchair. This was explained to patient's father. Patient's follow up appointments listed above.

## 2024-07-01 NOTE — PROGRESS NOTES
Pharmacokinetic Assessment Follow Up: IV Vancomycin    Vancomycin serum concentration assessment(s):    The trough level was drawn correctly and can be used to guide therapy at this time. The measurement is within the desired definitive target range of 10 to 15 mcg/mL.    Vancomycin Regimen Plan:    Continue regimen to Vancomycin 750 mg IV every 12 hours with next serum trough concentration measured at 2100 prior to 4th dose on 7/2/24    Drug levels (last 3 results):  Recent Labs   Lab Result Units 06/29/24  2103 07/01/24  0909   Vancomycin-Trough ug/mL 14.4 15.4       Pharmacy will continue to follow and monitor vancomycin.    Please contact pharmacy at extension [phone] for questions regarding this assessment.    Thank you for the consult,   Avery Mistry       Patient brief summary:  Janes Strange is a 54 y.o. male initiated on antimicrobial therapy with IV Vancomycin for treatment of lower respiratory infection    The patient's current regimen is 750mg q 12 h    Drug Allergies:   Review of patient's allergies indicates:   Allergen Reactions    Mayonnaise Blisters    Shellfish containing products      seafood       Actual Body Weight:   60kg    Renal Function:   Estimated Creatinine Clearance: 71.7 mL/min (based on SCr of 0.9 mg/dL).,     Dialysis Method (if applicable):  N/A    CBC (last 72 hours):  Recent Labs   Lab Result Units 06/29/24  0426 06/30/24  1106   WBC K/uL 21.38* 18.53*   Hemoglobin g/dL 14.7 15.9   Hematocrit % 43.5 45.9   Platelets K/uL 385 356   Gran % % 80.4* 72.2   Lymph % % 13.2* 18.5   Mono % % 5.5 7.8   Eosinophil % % 0.0 0.1   Basophil % % 0.1 0.3   Differential Method  Automated Automated       Metabolic Panel (last 72 hours):  Recent Labs   Lab Result Units 06/29/24  0426 06/30/24  1106   Sodium mmol/L 140 138   Potassium mmol/L 3.8 3.8   Chloride mmol/L 108 106   CO2 mmol/L 23 22*   Glucose mg/dL 135* 121*   BUN mg/dL 19 22*   Creatinine mg/dL 1.0 0.9   Albumin g/dL 2.4* 2.6*    Total Bilirubin mg/dL 0.5 0.4   Alkaline Phosphatase U/L 53* 62   AST U/L 16 17   ALT U/L 24 22       Vancomycin Administrations:  vancomycin given in the last 96 hours                     vancomycin 750 mg in D5W 250 mL IVPB (Vial-Mate) (mg) 750 mg New Bag 06/30/24 2249     750 mg New Bag  1100     750 mg New Bag 06/29/24 2153     750 mg New Bag  1010     750 mg New Bag 06/28/24 2248    vancomycin 1,500 mg in D5W 250 mL IVPB (Vial-Mate) (mg) 1,500 mg New Bag 06/28/24 1000                    Microbiologic Results:  Microbiology Results (last 7 days)       Procedure Component Value Units Date/Time    Blood culture #1 **CANNOT BE ORDERED STAT** [[id number]] Collected: 06/24/24 1042    Order Status: Completed Specimen: Blood from Peripheral, Hand, Left Updated: 06/29/24 1612     Blood Culture, Routine No growth after 5 days.    Blood culture #2 **CANNOT BE ORDERED STAT** [[id number]] Collected: 06/24/24 1036    Order Status: Completed Specimen: Blood from Peripheral, Forearm, Left Updated: 06/29/24 1612     Blood Culture, Routine No growth after 5 days.    Culture, Respiratory with Gram Stain [[id number]]     Order Status: No result Specimen: Respiratory     Blood culture [[id number]]     Order Status: Canceled Specimen: Blood     Blood culture [[id number]]     Order Status: Canceled Specimen: Blood     Influenza A & B by Molecular [[id number]] Collected: 06/24/24 1026    Order Status: Completed Specimen: Nasopharyngeal Swab Updated: 06/24/24 1102     Influenza A, Molecular Negative     Influenza B, Molecular Negative     Flu A & B Source Nasal swab    Group A Strep, Molecular [[id number]] Collected: 06/24/24 1026    Order Status: Completed Specimen: Throat Updated: 06/24/24 1053     Group A Strep, Molecular Negative     Comment: Arcanobacterium haemolyticum and Beta Streptococcus group C   and G will not be detected by this test method.  Please order   Throat Culture ([id number]) if suspected.

## 2024-07-01 NOTE — SUBJECTIVE & OBJECTIVE
Past Medical History:   Diagnosis Date    Down's syndrome     Seizure        History reviewed. No pertinent surgical history.    Review of patient's allergies indicates:   Allergen Reactions    Mayonnaise Blisters    Shellfish containing products      seafood       No current facility-administered medications on file prior to encounter.     Current Outpatient Medications on File Prior to Encounter   Medication Sig    allopurinoL (ZYLOPRIM) 300 MG tablet Take 1 tablet (300 mg total) by mouth once daily.    clopidogreL (PLAVIX) 75 mg tablet Take 1 tablet by mouth once daily    cyanocobalamin 500 MCG tablet Take 500 mcg by mouth once daily.    loratadine (CLARITIN) 10 mg tablet Take 1 tablet (10 mg total) by mouth once daily.    MULTIVITAMIN ORAL Take 1 tablet by mouth once daily. Centrum vitamin    omeprazole (PRILOSEC) 20 MG capsule Take 1 capsule (20 mg total) by mouth once daily.    rosuvastatin (CRESTOR) 10 MG tablet Take 10 mg by mouth once daily.    vitamin D 1000 units Tab Take 185 mg by mouth once daily.    mupirocin (BACTROBAN) 2 % ointment Apply topically 3 (three) times daily.     Family History    None       Tobacco Use    Smoking status: Never    Smokeless tobacco: Never   Substance and Sexual Activity    Alcohol use: No    Drug use: No    Sexual activity: Never     Review of Systems   Reason unable to perform ROS: no acute complaints.     Objective:     Vital Signs (Most Recent):  Temp: (!) 95.8 °F (35.4 °C) (07/01/24 0745)  Pulse: 106 (07/01/24 1000)  Resp: 19 (07/01/24 0745)  BP: 116/74 (07/01/24 0745)  SpO2: (!) 93 % (07/01/24 0745) Vital Signs (24h Range):  Temp:  [95.8 °F (35.4 °C)-98.2 °F (36.8 °C)] 95.8 °F (35.4 °C)  Pulse:  [] 106  Resp:  [16-22] 19  SpO2:  [92 %-98 %] 93 %  BP: (107-141)/(68-91) 116/74     Weight: 60 kg (132 lb 4.4 oz)  Body mass index is 27.65 kg/m².     Physical Exam  Constitutional:       General: He is not in acute distress.  HENT:      Head: Normocephalic and  "atraumatic.   Eyes:      General:         Right eye: No discharge.         Left eye: No discharge.   Cardiovascular:      Rate and Rhythm: Normal rate and regular rhythm.   Pulmonary:      Effort: Pulmonary effort is normal. No respiratory distress.      Breath sounds: Normal breath sounds.   Abdominal:      General: Bowel sounds are normal. There is no distension.      Palpations: Abdomen is soft.      Tenderness: There is no abdominal tenderness.   Musculoskeletal:         General: No swelling or tenderness.      Cervical back: Neck supple. No tenderness.   Skin:     General: Skin is warm and dry.   Neurological:      General: No focal deficit present.      Mental Status: He is alert. Mental status is at baseline.   Psychiatric:         Mood and Affect: Mood normal.         Behavior: Behavior normal.                Significant Labs: A1C: No results for input(s): "HGBA1C" in the last 4320 hours.  ABGs: No results for input(s): "PH", "PCO2", "HCO3", "POCSATURATED", "BE", "TOTALHB", "COHB", "METHB", "O2HB", "POCFIO2", "PO2" in the last 48 hours.  Bilirubin:   Recent Labs   Lab 06/26/24  0756 06/27/24  1116 06/28/24  0824 06/29/24  0426 06/30/24  1106   BILITOT 0.7 0.6 0.7 0.5 0.4     Blood Culture:   No results for input(s): "LABBLOO" in the last 48 hours.    BMP:   Recent Labs   Lab 06/30/24  1106   *      K 3.8      CO2 22*   BUN 22*   CREATININE 0.9   CALCIUM 9.2     CBC:   Recent Labs   Lab 06/30/24  1106   WBC 18.53*   HGB 15.9   HCT 45.9        CMP:   Recent Labs   Lab 06/30/24  1106      K 3.8      CO2 22*   *   BUN 22*   CREATININE 0.9   CALCIUM 9.2   PROT 6.3   ALBUMIN 2.6*   BILITOT 0.4   ALKPHOS 62   AST 17   ALT 22   ANIONGAP 10     Cardiac Markers:   No results for input(s): "CKMB", "MYOGLOBIN", "BNP", "TROPISTAT" in the last 48 hours.      Coagulation: No results for input(s): "PT", "INR", "APTT" in the last 48 hours.  Lactic Acid:   No results for input(s): " ""LACTATE" in the last 48 hours.    Lipase: No results for input(s): "LIPASE" in the last 48 hours.  Lipid Panel: No results for input(s): "CHOL", "HDL", "LDLCALC", "TRIG", "CHOLHDL" in the last 48 hours.  Magnesium:   No results for input(s): "MG" in the last 48 hours.      POCT Glucose: No results for input(s): "POCTGLUCOSE" in the last 48 hours.  Prealbumin: No results for input(s): "PREALBUMIN" in the last 48 hours.  Respiratory Culture: No results for input(s): "GSRESP", "RESPIRATORYC" in the last 48 hours.  Troponin:   No results for input(s): "TROPONINI", "TROPONINIHS" in the last 48 hours.    TSH:   Recent Labs   Lab 02/20/24  0831   TSH 4.622*     Urine Culture: No results for input(s): "LABURIN" in the last 48 hours.  Urine Studies:   No results for input(s): "COLORU", "APPEARANCEUA", "PHUR", "SPECGRAV", "PROTEINUA", "GLUCUA", "KETONESU", "BILIRUBINUA", "OCCULTUA", "NITRITE", "UROBILINOGEN", "LEUKOCYTESUR", "RBCUA", "WBCUA", "BACTERIA", "SQUAMEPITHEL", "HYALINECASTS" in the last 48 hours.    Invalid input(s): "WRIGHTSUR"      Significant Imaging: I have reviewed all pertinent imaging results/findings within the past 24 hours.  "

## 2024-07-01 NOTE — ASSESSMENT & PLAN NOTE
"Bilateral lobe hazziness L>R on CXR  Coverage for bacterial pneumonia although procal wnl.      Antibiotics (From admission, onward)    Start     Stop Route Frequency Ordered    06/28/24 2200  vancomycin 750 mg in D5W 250 mL IVPB (Vial-Mate)         -- IV Every 12 hours (non-standard times) 06/28/24 1011    06/28/24 0905  vancomycin - pharmacy to dose  (vancomycin IVPB (PEDS and ADULTS))        Placed in "And" Linked Group    -- IV pharmacy to manage frequency 06/28/24 0805    06/24/24 1900  piperacillin-tazobactam (ZOSYN) 4.5 g in D5W 100 mL IVPB (MB+)         -- IV Every 8 hours (non-standard times) 06/24/24 1355          Microbiology Results (last 7 days)     Procedure Component Value Units Date/Time    Blood culture #1 **CANNOT BE ORDERED STAT** [[id number]] Collected: 06/24/24 1042    Order Status: Completed Specimen: Blood from Peripheral, Hand, Left Updated: 06/29/24 1612     Blood Culture, Routine No growth after 5 days.    Blood culture #2 **CANNOT BE ORDERED STAT** [[id number]] Collected: 06/24/24 1036    Order Status: Completed Specimen: Blood from Peripheral, Forearm, Left Updated: 06/29/24 1612     Blood Culture, Routine No growth after 5 days.    Culture, Respiratory with Gram Stain [[id number]]     Order Status: No result Specimen: Respiratory     Blood culture [[id number]]     Order Status: Canceled Specimen: Blood     Blood culture [[id number]]     Order Status: Canceled Specimen: Blood     Influenza A & B by Molecular [[id number]] Collected: 06/24/24 1026    Order Status: Completed Specimen: Nasopharyngeal Swab Updated: 06/24/24 1102     Influenza A, Molecular Negative     Influenza B, Molecular Negative     Flu A & B Source Nasal swab    Group A Strep, Molecular [[id number]] Collected: 06/24/24 1026    Order Status: Completed Specimen: Throat Updated: 06/24/24 1053     Group A Strep, Molecular Negative     Comment: Arcanobacterium haemolyticum and Beta Streptococcus group C   and G will not be " detected by this test method.  Please order   Throat Culture ([id number]) if suspected.             Continued left lobe consolidation   Repeat chest x-ray today looks pretty good.    7/1 Now on room air.  Will discharge with course of doxycycline.

## 2024-07-01 NOTE — PLAN OF CARE
Problem: Physical Therapy  Goal: Physical Therapy Goal  Description: Patient will increase functional independence with mobility by performin. Pt able to participate and tolerate ROM ex on B LE x 10 reps on each to prevent joint stiffness and prevent decline in functions.  2. Educate caregiver/family the proper ROM ex  on B LE with good understanding and carry over.  3. Pt able to participate and perform out of bed activity with minimal assistance.       Outcome: Met

## 2024-07-01 NOTE — ASSESSMENT & PLAN NOTE
"Bilateral lobe hazziness L>R on CXR  Coverage for bacterial pneumonia although procal wnl.      Antibiotics (From admission, onward)      Start     Stop Route Frequency Ordered    06/28/24 2200  vancomycin 750 mg in D5W 250 mL IVPB (Vial-Mate)         -- IV Every 12 hours (non-standard times) 06/28/24 1011    06/28/24 0905  vancomycin - pharmacy to dose  (vancomycin IVPB (PEDS and ADULTS))        Placed in "And" Linked Group    -- IV pharmacy to manage frequency 06/28/24 0805    06/24/24 1900  piperacillin-tazobactam (ZOSYN) 4.5 g in D5W 100 mL IVPB (MB+)         -- IV Every 8 hours (non-standard times) 06/24/24 1355            Microbiology Results (last 7 days)       Procedure Component Value Units Date/Time    Blood culture #1 **CANNOT BE ORDERED STAT** [[id number]] Collected: 06/24/24 1042    Order Status: Completed Specimen: Blood from Peripheral, Hand, Left Updated: 06/29/24 1612     Blood Culture, Routine No growth after 5 days.    Blood culture #2 **CANNOT BE ORDERED STAT** [[id number]] Collected: 06/24/24 1036    Order Status: Completed Specimen: Blood from Peripheral, Forearm, Left Updated: 06/29/24 1612     Blood Culture, Routine No growth after 5 days.    Culture, Respiratory with Gram Stain [[id number]]     Order Status: No result Specimen: Respiratory     Blood culture [[id number]]     Order Status: Canceled Specimen: Blood     Blood culture [[id number]]     Order Status: Canceled Specimen: Blood     Influenza A & B by Molecular [[id number]] Collected: 06/24/24 1026    Order Status: Completed Specimen: Nasopharyngeal Swab Updated: 06/24/24 1102     Influenza A, Molecular Negative     Influenza B, Molecular Negative     Flu A & B Source Nasal swab    Group A Strep, Molecular [[id number]] Collected: 06/24/24 1026    Order Status: Completed Specimen: Throat Updated: 06/24/24 1053     Group A Strep, Molecular Negative     Comment: Arcanobacterium haemolyticum and Beta Streptococcus group C   and G will not " be detected by this test method.  Please order   Throat Culture ([id number]) if suspected.               Continued left lobe consolidation   Repeat chest x-ray today looks pretty good.    7/1 Now on room air.  Will discharge with course of doxycycline.

## 2024-07-01 NOTE — ASSESSMENT & PLAN NOTE
Due to covid pneumonia/bacterial pneumonia  CXR with increased hazziness in LLQ but laterally rotated  Waiting radiology read.  If unable to wean off oxygen will consider CTA chest and further work up.      6/28: No PE on CTA chest.  Persistent left lobe PNA.  Added vancomycin to IV zosyn for pneumonia treatment.  Pulmonology notified.  Hopefully we can start weaning shortly.    6/30 getting better.  Now on 3 L nasal cannula      7/1 Resolved

## 2024-07-01 NOTE — DISCHARGE SUMMARY
[facility] ([facility])  [place] [facility]  Discharge Summary      Patient Name: Janes Strange  MRN: [id number]  [facility]: [id number]  Patient Class: IP- Inpatient  Admission Date: 6/24/2024  Hospital Length of Stay: 6 days  Discharge Date and Time:  07/01/2024 10:33 AM  Attending Physician: Tomasz Castillo MD   Discharging Provider: Desire Skaggs PA-C  Primary Care Provider: Luna Kelly NP    Primary Care Team: Networked reference to record PCT     HPI:   Patient is a 54 year old male with medical history of Down's Syndrome, HLD, TIA and chronic cough who presented to the ED with cough and congestion for 2 weeks.  Patient currently not answering and history obtained from chart review.  Attempted to call father but is currently at eye doctor appointment.        Admitted for sepsis secondary to covid & bacterial pneumonia           * No surgery found *      Hospital Course:   PT admitted for sepsis secondary to covid pneumonia.  Hypoxia occurs when sleeping and will wean supplemental oxygen.  CXR pending.  Jones catheter in place due to urinary retention.  U/A negative.  Abdomen distended at times but then relaxes.  KUB pending.  Lactic acid elevated yesterday.  Will get repeat lactic acid this morning.      6/26: PT HD stable on 1 L of supplemental oxygen with oxygen saturation 95%.  Plan to wean supplemental oxygen and discharge within the next 24 hours.        6/27 Yesterday patient was able to be weaned off oxygen but became hypoxic overnight.  PT currently HD stable on 2-3 L nasal cannula.  CXR pending.  Respiratory exam with good air movement and no wheezing.  Will attempt to wean supplemental oxygen today.      6/28 Worsening hypoxia and now on 6 L.  Added vancomycin to IV zosyn.  Patient's last day of remdesivir for covid is today.  Discussed with pulmonology and will likely round on patient tomorrow.  Discussed plan of care with dad.      6/29 patient is still on 6 L nasal cannula but seems  "to be stable.  Her O2 sats are in the mid 90 percentile.  Patient is still on Decadron, vancomycin, Zosyn.  He seems to be getting better.    6/30 patient now on 3 L nasal cannula.  He is looking much better.  He is starting to eat.  He makes good eye contact and tries to answers questions.  No sign of respiratory distress      7/1 PT now on room air and ambulatory oxygen saturation with PT 94%.  Will discharge with home health.  Course of doxycycline ordered for MRSA coverage.       Goals of Care Treatment Preferences:  Code Status: Full Code      Consults:   Consults (From admission, onward)          Status Ordering Provider     Pharmacy to dose Vancomycin consult  Once        Provider:  (Not yet assigned)   Placed in "And" Linked Group    Acknowledged RHETT GARZA            Neuro  History of TIA (transient ischemic attack)  Continue atorvastatin and plavix      Pulmonary  Hypoxia  Due to covid pneumonia/bacterial pneumonia  CXR with increased hazziness in LLQ but laterally rotated  Waiting radiology read.  If unable to wean off oxygen will consider CTA chest and further work up.      6/28: No PE on CTA chest.  Persistent left lobe PNA.  Added vancomycin to IV zosyn for pneumonia treatment.  Pulmonology notified.  Hopefully we can start weaning shortly.    6/30 getting better.  Now on 3 L nasal cannula      7/1 Resolved     Pneumonia  Bilateral lobe hazziness L>R on CXR  Coverage for bacterial pneumonia although procal wnl.      Antibiotics (From admission, onward)      Start     Stop Route Frequency Ordered    06/28/24 2200  vancomycin 750 mg in D5W 250 mL IVPB (Vial-Mate)         -- IV Every 12 hours (non-standard times) 06/28/24 1011    06/28/24 0905  vancomycin - pharmacy to dose  (vancomycin IVPB (PEDS and ADULTS))        Placed in "And" Linked Group    -- IV pharmacy to manage frequency 06/28/24 0805    06/24/24 1900  piperacillin-tazobactam (ZOSYN) 4.5 g in D5W 100 mL IVPB (MB+)         -- IV Every " 8 hours (non-standard times) 06/24/24 1355            Microbiology Results (last 7 days)       Procedure Component Value Units Date/Time    Blood culture #1 **CANNOT BE ORDERED STAT** [[id number]] Collected: 06/24/24 1042    Order Status: Completed Specimen: Blood from Peripheral, Hand, Left Updated: 06/29/24 1612     Blood Culture, Routine No growth after 5 days.    Blood culture #2 **CANNOT BE ORDERED STAT** [[id number]] Collected: 06/24/24 1036    Order Status: Completed Specimen: Blood from Peripheral, Forearm, Left Updated: 06/29/24 1612     Blood Culture, Routine No growth after 5 days.    Culture, Respiratory with Gram Stain [[id number]]     Order Status: No result Specimen: Respiratory     Blood culture [[id number]]     Order Status: Canceled Specimen: Blood     Blood culture [[id number]]     Order Status: Canceled Specimen: Blood     Influenza A & B by Molecular [[id number]] Collected: 06/24/24 1026    Order Status: Completed Specimen: Nasopharyngeal Swab Updated: 06/24/24 1102     Influenza A, Molecular Negative     Influenza B, Molecular Negative     Flu A & B Source Nasal swab    Group A Strep, Molecular [[id number]] Collected: 06/24/24 1026    Order Status: Completed Specimen: Throat Updated: 06/24/24 1053     Group A Strep, Molecular Negative     Comment: Arcanobacterium haemolyticum and Beta Streptococcus group C   and G will not be detected by this test method.  Please order   Throat Culture ([id number]) if suspected.               Continued left lobe consolidation   Repeat chest x-ray today looks pretty good.    7/1 Now on room air.  Will discharge with course of doxycycline.     Cardiac/Vascular  HLD (hyperlipidemia)  Continue statin       Renal/  Urinary retention  Jones catheter in place   Try to remove the Jones today    Resolved.  Jones catheter removed.        ID  * Sepsis due to pneumonia  This patient does have evidence of infective focus  My overall impression is sepsis.  Source:  "Respiratory  Antibiotics given-   Antibiotics (72h ago, onward)      Start     Stop Route Frequency Ordered    06/28/24 2200  vancomycin 750 mg in D5W 250 mL IVPB (Vial-Mate)         -- IV Every 12 hours (non-standard times) 06/28/24 1011    06/28/24 0905  vancomycin - pharmacy to dose  (vancomycin IVPB (PEDS and ADULTS))        Placed in "And" Linked Group    -- IV pharmacy to manage frequency 06/28/24 0805    06/24/24 1900  piperacillin-tazobactam (ZOSYN) 4.5 g in D5W 100 mL IVPB (MB+)         -- IV Every 8 hours (non-standard times) 06/24/24 1355          Latest lactate reviewed-  No results for input(s): "LACTATE", "POCLAC" in the last 72 hours.    Organ dysfunction indicated by pulmonary edema on CXR     Fluid challenge 2 L bolus     Post- resuscitation assessment No - Post resuscitation assessment not needed     CXR pulmonary edema, L lobe consolidation   Will Not start Pressors- Levophed for MAP of 65  Source control achieved by: IV zosyn, IV vancomycin  Blood cultures pending/ U/A negative   BNP, procal & troponin wnl     Improving.  Patient now on 3 L nasal cannula and getting better.    Resolved.      COVID-19  Patient is identified as Severe COVID-19 based on hypoxemia with O2 saturations <94% on room air or on ambulation   Initiate standard COVID protocols; COVID-19 testing ,Infection Control notification  and isolation- respiratory, contact and droplet per protocol    Diagnostics: CBC, CMP, Ferritin, CRP, and Portable CXR    Management: Maintain oxygen saturations 92-96% via currently on room air and monitor with continuous/intermittent pulse oximetry. , Inhaled bronchodilators as needed for shortness of breath., and Continuous cardiac monitoring.    Advance Care Planning Current advance care plan has not been discussed with patient. Attempted to contact father     Able to decrease oxygen to 3 L.  Continue albuterol.    Decadron discontinue  Patient completed remdesivir        GI  Abdominal " distention  Intermittent  KUB pending  Last bowel movement 2-3 days ago per nursing staff    6/26 Improving.  KUB with Bowel gas pattern.  LBM 6/25.      resolved      GERD (gastroesophageal reflux disease)  Continue protonix       Orthopedic  Gout  Continue allopurinol      Other  Elevated lactic acid level  Lactic acid 4.1 at presentation and now 2.8     6/25 Repeat lactic acid yesterday trending up to 3.1.  Currently only IV fluids.  Repeat lactic acid pending.      6/26 Lactic acid today 1.6.       Down syndrome  Received haloperidol in the ED   Currently calm, awake and answers yes/no        Final Active Diagnoses:    Diagnosis Date Noted POA    PRINCIPAL PROBLEM:  Sepsis due to pneumonia [J18.9, A41.9] 06/24/2024 Yes    Hypoxia [R09.02] 06/27/2024 Yes    Urinary retention [R33.9] 06/25/2024 No    Abdominal distention [R14.0] 06/25/2024 Yes    COVID-19 [U07.1] 06/24/2024 Yes    Elevated lactic acid level [R79.89] 06/24/2024 Yes    Pneumonia [J18.9] 06/24/2024 Yes    HLD (hyperlipidemia) [E78.5] 11/24/2014 Yes    History of TIA (transient ischemic attack) [Z86.73] 11/24/2014 Not Applicable    Down syndrome [Q90.9] 11/24/2014 Not Applicable    Gout [M10.9] 11/24/2014 Yes    GERD (gastroesophageal reflux disease) [K21.9] 11/24/2014 Yes      Problems Resolved During this Admission:       Discharged Condition: good    Disposition:     Follow Up:    Patient Instructions:      Ambulatory referral/consult to Home Health   Standing Status: Future   Referral Priority: Routine Referral Type: Home Health   Referral Reason: Specialty Services Required   Requested Specialty: Home Health Services   Number of Visits Requested: 1       Significant Diagnostic Studies: see A&P     Pending Diagnostic Studies:       Procedure Component Value Units Date/Time    Echo Saline Bubble? Yes [[id number]]  (Abnormal) Resulted: 06/28/24 1138    Order Status: Sent Lab Status: In process Updated: 06/28/24 1138     BSA 1.57 m2      LVOT stroke  volume 22.80 cm3      LVIDd 3.52 cm      LV Systolic Volume 33.08 mL      LV Systolic Volume Index 21.6 mL/m2      LVIDs 2.93 cm      LV Diastolic Volume 51.51 mL      LV Diastolic Volume Index 33.67 mL/m2      Left Ventricular End Systolic Volume by Teichholz Method 33.08 mL      Left Ventricular End Diastolic Volume by Teichholz Method 51.51 mL      IVS 0.61 cm      LVOT diameter 1.64 cm      LVOT area 2.1 cm2      FS 17 %      Left Ventricle Relative Wall Thickness 0.30 cm      Posterior Wall 0.52 cm      LV mass 47.97 g      LV Mass Index 31 g/m2      MV Peak E Palomo 0.55 m/s      TDI LATERAL 0.04 m/s      TDI SEPTAL 0.06 m/s      E/E' ratio 11.00 m/s      MV Peak A Palomo 0.73 m/s      E/A ratio 0.75     E wave deceleration time 209.63 msec      LV SEPTAL E/E' RATIO 9.17 m/s      LV LATERAL E/E' RATIO 13.75 m/s      PV Peak S Palomo 0.35 m/s      PV Peak D Palomo 0.25 m/s      Pulm vein S/D ratio 1.40     LVOT peak palomo 0.63 m/s      Left Ventricular Outflow Tract Mean Velocity 0.42 cm/s      Left Ventricular Outflow Tract Mean Gradient 0.79 mmHg      RV S' 10.39 cm/s      TAPSE 1.57 cm      RV/LV Ratio 0.43 cm      LA size 2.60 cm      Left Atrium Major Axis 3.80 cm      RA Major Axis 3.49 cm      AV mean gradient 2 mmHg      AV peak gradient 3 mmHg      Ao peak palomo 0.88 m/s      Ao VTI 13.60 cm      LVOT peak VTI 10.80 cm      AV valve area 1.68 cm²      AV Velocity Ratio 0.72     AV index (prosthetic) 0.79     ANDREW by Velocity Ratio 1.51 cm²      MV mean gradient 1 mmHg      MV peak gradient 3 mmHg      MV valve area by continuity eq 1.09 cm2      MV VTI 21.0 cm      PV PEAK VELOCITY 1.06 m/s      PV peak gradient 4 mmHg      IVC diameter 0.98 cm      Mean e' 0.05 m/s      ZLVIDS 0.44     ZLVIDD -2.37     RVDD 1.52 cm     Vitamin B1 [[id number]] Collected: 06/26/24 0758    Order Status: Sent Lab Status: In process Updated: 06/26/24 1328    Specimen: Blood            Medications:  Reconciled Home Medications:       Medication List        START taking these medications      albuterol 90 mcg/actuation inhaler  Commonly known as: VENTOLIN HFA  Inhale 2 puffs into the lungs every 6 (six) hours as needed for Wheezing or Shortness of Breath. Rescue     CULTURELLE 10 billion cell capsule  Generic drug: Lactobacillus rhamnosus GG  Take 1 capsule by mouth once daily. for 15 days     doxycycline 100 MG Cap  Commonly known as: VIBRAMYCIN  Take 1 capsule (100 mg total) by mouth every 12 (twelve) hours. for 5 days     guaiFENesin 600 mg 12 hr tablet  Commonly known as: MUCINEX  Take 2 tablets (1,200 mg total) by mouth 2 (two) times daily as needed for Congestion.     tamsulosin 0.4 mg Cap  Commonly known as: FLOMAX  Take 1 capsule (0.4 mg total) by mouth nightly.            CHANGE how you take these medications      vitamin D 1000 units Tab  Commonly known as: VITAMIN D3  Take 1 tablet (1,000 Units total) by mouth once daily.  What changed: how much to take            CONTINUE taking these medications      allopurinoL 300 MG tablet  Commonly known as: ZYLOPRIM  Take 1 tablet (300 mg total) by mouth once daily.     clopidogreL 75 mg tablet  Commonly known as: PLAVIX  Take 1 tablet by mouth once daily     cyanocobalamin 500 MCG tablet  Take 500 mcg by mouth once daily.     loratadine 10 mg tablet  Commonly known as: CLARITIN  Take 1 tablet (10 mg total) by mouth once daily.     MULTIVITAMIN ORAL  Take 1 tablet by mouth once daily. Centrum vitamin     mupirocin 2 % ointment  Commonly known as: BACTROBAN  Apply topically 3 (three) times daily.     omeprazole 20 MG capsule  Commonly known as: PRILOSEC  Take 1 capsule (20 mg total) by mouth once daily.     rosuvastatin 10 MG tablet  Commonly known as: CRESTOR  Take 10 mg by mouth once daily.                  Time spent on the discharge of patient: 25 minutes         Desire Skaggs PA-C  Department of Hospital Medicine  [facility] (3rd Fl)

## 2024-07-01 NOTE — NURSING
Patient discharged to home. AVS reviewed with father at bedside. Patient is developmentally challenged; Downs syndrome. Reviewed F/U hospital appointment. Referral to Home health services. Reviewed medications sent to pharmacy and which to resume and/or stop taking. No further questions at this time. Father in agreement to take Son home at this time for home/self-care.

## 2024-07-01 NOTE — PLAN OF CARE
07/01/24 1426   Rounds   Attendance Nurse    Discharge Plan A Home with family   Transition of Care Barriers Other (see comments)  (sw to arrange for DME)     Care team reviewed plan to discharge today with family.  Family VU of information discussed.

## 2024-07-02 LAB — VIT B1 BLD-MCNC: 92 UG/L (ref 38–122)

## 2024-07-03 ENCOUNTER — PATIENT OUTREACH (OUTPATIENT)
Dept: ADMINISTRATIVE | Facility: CLINIC | Age: 54
End: 2024-07-03
Payer: MEDICARE

## 2024-07-03 ENCOUNTER — OFFICE VISIT (OUTPATIENT)
Dept: INTERNAL MEDICINE | Facility: CLINIC | Age: 54
End: 2024-07-03
Payer: MEDICARE

## 2024-07-03 VITALS
SYSTOLIC BLOOD PRESSURE: 110 MMHG | BODY MASS INDEX: 27.65 KG/M2 | DIASTOLIC BLOOD PRESSURE: 64 MMHG | RESPIRATION RATE: 20 BRPM | TEMPERATURE: 99 F | OXYGEN SATURATION: 97 % | HEIGHT: 60 IN | HEART RATE: 119 BPM

## 2024-07-03 DIAGNOSIS — J12.82 PNEUMONIA DUE TO COVID-19 VIRUS: Primary | ICD-10-CM

## 2024-07-03 DIAGNOSIS — U07.1 PNEUMONIA DUE TO COVID-19 VIRUS: Primary | ICD-10-CM

## 2024-07-03 DIAGNOSIS — M10.069 IDIOPATHIC GOUT OF KNEE, UNSPECIFIED CHRONICITY, UNSPECIFIED LATERALITY: ICD-10-CM

## 2024-07-03 DIAGNOSIS — Z86.73 HISTORY OF TIA (TRANSIENT ISCHEMIC ATTACK): ICD-10-CM

## 2024-07-03 DIAGNOSIS — K21.9 GASTROESOPHAGEAL REFLUX DISEASE: ICD-10-CM

## 2024-07-03 DIAGNOSIS — R53.81 DEBILITY: ICD-10-CM

## 2024-07-03 PROCEDURE — 1111F DSCHRG MED/CURRENT MED MERGE: CPT | Mod: CPTII,S$GLB,, | Performed by: NURSE PRACTITIONER

## 2024-07-03 PROCEDURE — 1159F MED LIST DOCD IN RCRD: CPT | Mod: CPTII,S$GLB,, | Performed by: NURSE PRACTITIONER

## 2024-07-03 PROCEDURE — 99999 PR PBB SHADOW E&M-EST. PATIENT-LVL IV: CPT | Mod: PBBFAC,,, | Performed by: NURSE PRACTITIONER

## 2024-07-03 PROCEDURE — 99214 OFFICE O/P EST MOD 30 MIN: CPT | Mod: S$GLB,,, | Performed by: NURSE PRACTITIONER

## 2024-07-03 PROCEDURE — 3008F BODY MASS INDEX DOCD: CPT | Mod: CPTII,S$GLB,, | Performed by: NURSE PRACTITIONER

## 2024-07-03 PROCEDURE — 3078F DIAST BP <80 MM HG: CPT | Mod: CPTII,S$GLB,, | Performed by: NURSE PRACTITIONER

## 2024-07-03 PROCEDURE — 3074F SYST BP LT 130 MM HG: CPT | Mod: CPTII,S$GLB,, | Performed by: NURSE PRACTITIONER

## 2024-07-03 RX ORDER — ALLOPURINOL 300 MG/1
300 TABLET ORAL DAILY
Qty: 90 TABLET | Refills: 3 | Status: SHIPPED | OUTPATIENT
Start: 2024-07-03

## 2024-07-03 RX ORDER — ROSUVASTATIN CALCIUM 10 MG/1
10 TABLET, COATED ORAL DAILY
Qty: 90 TABLET | Refills: 3 | Status: SHIPPED | OUTPATIENT
Start: 2024-07-03

## 2024-07-03 RX ORDER — OMEPRAZOLE 20 MG/1
20 CAPSULE, DELAYED RELEASE ORAL DAILY
Qty: 90 CAPSULE | Refills: 3 | Status: SHIPPED | OUTPATIENT
Start: 2024-07-03

## 2024-07-03 RX ORDER — CLOPIDOGREL BISULFATE 75 MG/1
75 TABLET ORAL DAILY
Qty: 90 TABLET | Refills: 3 | Status: SHIPPED | OUTPATIENT
Start: 2024-07-03

## 2024-07-03 NOTE — PROGRESS NOTES
C3 nurse attempted to contact Janes Strange for a TCC post hospital discharge follow up call. No answer. Left voicemail with callback information. The patient has a scheduled HOSFU appointment with Luna Kelly on 07/03/24 @ 8493.

## 2024-07-03 NOTE — PROGRESS NOTES
Subjective:       Patient ID: Janes Strange is a 54 y.o. male.    Chief Complaint: Hospital Follow Up    HPI: Pt presents to clinic today known to me for hospital f/u. Admitted for sepsis secondary to covid/bacterial pneumonia. Upon d/c he was ambulatory with walker 100ft and on RA. Dad reoprt that he is still coughing but he is using OTC tussin and it is helping. HOme helath has not been set up. He has a few days left on doxy and was also started on flomax for urinary retenstion. Has nothad any issues with urination. He also has the albuterol but has not needed. Dad has bene following his POX and it hs been running about 97%- it has not been below 90 as that is why he would give him a puff.     >>>PT admitted for sepsis secondary to covid pneumonia.  Hypoxia occurs when sleeping and will wean supplemental oxygen.  CXR pending.  Jones catheter in place due to urinary retention.  U/A negative.  Abdomen distended at times but then relaxes.  KUB pending.  Lactic acid elevated yesterday.  Will get repeat lactic acid this morning.       6/26: PT HD stable on 1 L of supplemental oxygen with oxygen saturation 95%.  Plan to wean supplemental oxygen and discharge within the next 24 hours.         6/27 Yesterday patient was able to be weaned off oxygen but became hypoxic overnight.  PT currently HD stable on 2-3 L nasal cannula.  CXR pending.  Respiratory exam with good air movement and no wheezing.  Will attempt to wean supplemental oxygen today.       6/28 Worsening hypoxia and now on 6 L.  Added vancomycin to IV zosyn.  Patient's last day of remdesivir for covid is today.  Discussed with pulmonology and will likely round on patient tomorrow.  Discussed plan of care with pita.       6/29 patient is still on 6 L nasal cannula but seems to be stable.  Her O2 sats are in the mid 90 percentile.  Patient is still on Decadron, vancomycin, Zosyn.  He seems to be getting better.     6/30 patient now on 3 L nasal cannula.  He is  looking much better.  He is starting to eat.  He makes good eye contact and tries to answers questions.  No sign of respiratory distress      7/1 PT now on room air and ambulatory oxygen saturation with PT 94%.  Will discharge with home health.  Course of doxycycline ordered for MRSA coverage.     Lab Results   Component Value Date    WBC 18.53 (H) 06/30/2024    HGB 15.9 06/30/2024    HCT 45.9 06/30/2024    MCV 86 06/30/2024     06/30/2024       BMP  Lab Results   Component Value Date     06/30/2024    K 3.8 06/30/2024     06/30/2024    CO2 22 (L) 06/30/2024    BUN 22 (H) 06/30/2024    CREATININE 0.9 06/30/2024    CALCIUM 9.2 06/30/2024    ANIONGAP 10 06/30/2024    EGFRNORACEVR >60 06/30/2024     Lab Results   Component Value Date    ALT 22 06/30/2024    AST 17 06/30/2024    ALKPHOS 62 06/30/2024    BILITOT 0.4 06/30/2024   Blood cultures were No Growth     Last cxr Prominent markings left perihilar not significantly changed compared to the prior exam with no new or confluent infiltrate   Review of Systems   Constitutional:  Negative for chills and fever.   HENT:  Negative for congestion, postnasal drip and sore throat.    Eyes:  Negative for photophobia.   Respiratory:  Positive for cough. Negative for chest tightness and shortness of breath.    Cardiovascular:  Negative for chest pain.   Gastrointestinal:  Negative for abdominal distention, abdominal pain, blood in stool and vomiting.   Genitourinary:  Negative for dysuria, flank pain and hematuria.   Musculoskeletal:  Negative for back pain.   Skin:  Negative for pallor.   Neurological:  Negative for dizziness, seizures, facial asymmetry, speech difficulty and numbness.   Hematological:  Does not bruise/bleed easily.   Psychiatric/Behavioral:  Negative for agitation and suicidal ideas. The patient is not nervous/anxious.        Objective:      Physical Exam  Vitals and nursing note reviewed. Exam conducted with a chaperone present (father).    Constitutional:       Appearance: He is well-developed.      Comments: Using Rolator in office    HENT:      Head: Normocephalic and atraumatic.      Nose: Nose normal.   Eyes:      Conjunctiva/sclera: Conjunctivae normal.      Pupils: Pupils are equal, round, and reactive to light.   Neck:      Thyroid: No thyromegaly.      Vascular: No JVD.   Cardiovascular:      Rate and Rhythm: Normal rate and regular rhythm.      Heart sounds: Normal heart sounds.   Pulmonary:      Effort: Pulmonary effort is normal. No respiratory distress.      Breath sounds: Normal breath sounds. No wheezing or rales.      Comments: Loose cough but lungs are clear   Abdominal:      General: Bowel sounds are normal. There is no distension.      Palpations: Abdomen is soft. There is no mass.      Tenderness: There is no abdominal tenderness. There is no guarding.   Musculoskeletal:         General: No swelling. Normal range of motion.      Cervical back: Normal range of motion and neck supple.   Lymphadenopathy:      Cervical: No cervical adenopathy.   Skin:     General: Skin is warm and dry.      Capillary Refill: Capillary refill takes less than 2 seconds.      Coloration: Skin is not pale.      Findings: Bruising present. No rash.   Neurological:      Mental Status: He is alert and oriented to person, place, and time.      Cranial Nerves: No cranial nerve deficit.      Deep Tendon Reflexes: Reflexes are normal and symmetric.         Assessment:       1. Pneumonia due to COVID-19 virus    2. Debility        Plan:     Problem List Items Addressed This Visit       Pneumonia - Primary    Relevant Orders    Ambulatory referral/consult to Home Health     Other Visit Diagnoses       Debility        Relevant Orders    Ambulatory referral/consult to Home Health    WHEELCHAIR FOR HOME USE            Pt seems to be doing well- will check to be sure home health has been set up as well as ordered the [facility]

## 2024-07-05 NOTE — PROGRESS NOTES
C3 nurse attempted to contact Janes Strange's father for a TCC post hospital discharge follow up call. C3 nurse was told she had a wrong number. The patient has a scheduled HOSFU appointment with Luna Kelly on 07/03/24 @ 1140.     3rd attempt to contact patient. Manually enrolled in Post Discharge Tracker.

## 2024-07-05 NOTE — PROGRESS NOTES
2nd attempt to make TCC Call. Left voicemail. Please call [phone] leave your first and last name and date of birth and ask for Radha. I will return your call as soon as possible.

## 2024-07-09 ENCOUNTER — TELEPHONE (OUTPATIENT)
Dept: INTERNAL MEDICINE | Facility: CLINIC | Age: 54
End: 2024-07-09
Payer: MEDICARE

## 2024-07-09 NOTE — TELEPHONE ENCOUNTER
----- Message from Nikky Eugene sent at 2024 11:27 AM CDT -----  Contact: Rolando Strange  MRN: [id number]  : 1970  PCP: Luna Kelly  Home Phone      [phone]  Work Phone      Not on file.  Mobile          [phone]      MESSAGE: Mr. Marshall called to follow up on Home Health and a small wheel chair for Janes. He states he hasn't heard anything. He said if its something Luna can't do then we can get Dr. Bain to do it. Please call him back with an update.           [phone]

## 2024-07-09 NOTE — TELEPHONE ENCOUNTER
Attempted to contact pt. LVM to call back.     Orders were all faxed on 7/3/2024 and refaxed today

## 2024-07-11 ENCOUNTER — PATIENT OUTREACH (OUTPATIENT)
Dept: ADMINISTRATIVE | Facility: HOSPITAL | Age: 54
End: 2024-07-11
Payer: MEDICARE

## 2024-07-11 ENCOUNTER — TELEPHONE (OUTPATIENT)
Dept: INTERNAL MEDICINE | Facility: CLINIC | Age: 54
End: 2024-07-11

## 2024-07-11 NOTE — TELEPHONE ENCOUNTER
Attempted to contact Anirudh with "[employer]. The first number said the phone is no longer in service and the second number beeped.

## 2024-07-11 NOTE — PROGRESS NOTES
[facility], Colorectal Cancer Screening.  Chart reviewed, immunization record updated.  No new results noted on [facility] or [facility] web site.  Care Everywhere updated.   Patient care coordination note  Upcoming PCP visit updated.  Next PCP visit 7/31/2024 and lab visit on 7/25/2024.  Left detailed message for patient to return call to discuss Colorectal Cancer Screening and schedule eAWV.

## 2024-07-11 NOTE — TELEPHONE ENCOUNTER
----- Message from Nikky Eugene sent at 2024  2:37 PM CDT -----  Contact: Anirduh rae/ [employer]  Janes Strange  MRN: [id number]  : 1970  PCP: Luna Kelly  Home Phone      [phone]  Work Phone      Not on file.  Mobile          [phone]      MESSAGE: Anirudh from [employer] left a message on the machine in regards to the request for the Standard wheel chair. She states she needs additional info to support the need for a wheel chair    [phone]

## 2024-07-15 DIAGNOSIS — Z13.29 SCREENING FOR HYPOTHYROIDISM: Primary | ICD-10-CM

## 2024-07-15 DIAGNOSIS — R53.81 DEBILITY: ICD-10-CM

## 2024-07-15 DIAGNOSIS — M89.9 DISORDER OF BONE, UNSPECIFIED: ICD-10-CM

## 2024-07-15 DIAGNOSIS — Z78.9 HEALTH MAINTENANCE ALTERATION: ICD-10-CM

## 2024-07-15 DIAGNOSIS — E78.5 HYPERLIPIDEMIA, UNSPECIFIED HYPERLIPIDEMIA TYPE: ICD-10-CM

## 2024-07-15 DIAGNOSIS — R79.89 LOW VITAMIN D LEVEL: ICD-10-CM

## 2024-07-15 DIAGNOSIS — E53.8 VITAMIN B 12 DEFICIENCY: ICD-10-CM

## 2024-07-15 DIAGNOSIS — R68.89 OTHER GENERAL SYMPTOMS AND SIGNS: ICD-10-CM

## 2024-07-15 DIAGNOSIS — Z13.220 SCREENING FOR HYPERLIPIDEMIA: ICD-10-CM

## 2024-07-18 DIAGNOSIS — Z88.9 H/O SEASONAL ALLERGIES: ICD-10-CM

## 2024-07-18 RX ORDER — TAMSULOSIN HYDROCHLORIDE 0.4 MG/1
0.4 CAPSULE ORAL NIGHTLY
Qty: 30 CAPSULE | Refills: 0 | Status: SHIPPED | OUTPATIENT
Start: 2024-07-18 | End: 2025-07-18

## 2024-07-18 RX ORDER — LORATADINE 10 MG/1
10 TABLET ORAL DAILY
Qty: 90 TABLET | Refills: 3 | Status: SHIPPED | OUTPATIENT
Start: 2024-07-18

## 2024-07-18 NOTE — TELEPHONE ENCOUNTER
----- Message from Tisha Rogers sent at 2024  9:47 AM CDT -----  Contact: Matilda/amarilis  Janes Strange  MRN: [id number]  : 1970  PCP: Luna Kelly  Home Phone      [phone]  Work Phone      Not on file.  Mobile          [phone]      MESSAGE:     Amarilis with [facility] states pt needs a refill of loratadine (CLARITIN) 10 mg tablet and tamsulosin (FLOMAX) 0.4 mg Cap. Pt is not using [facility] in [place].         [facility]  [phone]

## 2024-07-18 NOTE — TELEPHONE ENCOUNTER
Patient needs refills on loratadine (CLARITIN) 10 mg tablet and tamsulosin (FLOMAX) 0.4 mg Cap.  Patient is now using [facility].    Requested Prescriptions     Pending Prescriptions Disp Refills    loratadine (CLARITIN) 10 mg tablet 90 tablet 3     Sig: Take 1 tablet (10 mg total) by mouth once daily.    tamsulosin (FLOMAX) 0.4 mg Cap 30 capsule 0     Sig: Take 1 capsule (0.4 mg total) by mouth nightly.

## 2024-07-19 ENCOUNTER — LAB VISIT (OUTPATIENT)
Dept: LAB | Facility: HOSPITAL | Age: 54
End: 2024-07-19
Attending: NURSE PRACTITIONER
Payer: MEDICARE

## 2024-07-19 DIAGNOSIS — Z13.220 SCREENING FOR LIPOID DISORDERS: Primary | ICD-10-CM

## 2024-07-19 DIAGNOSIS — M89.9 DISORDER OF BONE, UNSPECIFIED: ICD-10-CM

## 2024-07-19 DIAGNOSIS — R79.89 LOW VITAMIN D LEVEL: ICD-10-CM

## 2024-07-19 DIAGNOSIS — E78.5 HYPERLIPEMIA: ICD-10-CM

## 2024-07-19 LAB
25(OH)D3+25(OH)D2 SERPL-MCNC: 37 NG/ML (ref 30–96)
ALBUMIN SERPL BCP-MCNC: 3.1 G/DL (ref 3.5–5.2)
ALP SERPL-CCNC: 63 U/L (ref 55–135)
ALT SERPL W/O P-5'-P-CCNC: 16 U/L (ref 10–44)
ANION GAP SERPL CALC-SCNC: 11 MMOL/L (ref 8–16)
AST SERPL-CCNC: 15 U/L (ref 10–40)
BASOPHILS # BLD AUTO: 0.1 K/UL (ref 0–0.2)
BASOPHILS NFR BLD: 1 % (ref 0–1.9)
BILIRUB SERPL-MCNC: 0.6 MG/DL (ref 0.1–1)
BUN SERPL-MCNC: 12 MG/DL (ref 6–20)
CALCIUM SERPL-MCNC: 9.8 MG/DL (ref 8.7–10.5)
CHLORIDE SERPL-SCNC: 106 MMOL/L (ref 95–110)
CHOLEST SERPL-MCNC: 206 MG/DL (ref 120–199)
CHOLEST/HDLC SERPL: 4 {RATIO} (ref 2–5)
CO2 SERPL-SCNC: 26 MMOL/L (ref 23–29)
CREAT SERPL-MCNC: 0.9 MG/DL (ref 0.5–1.4)
DIFFERENTIAL METHOD BLD: ABNORMAL
EOSINOPHIL # BLD AUTO: 0.1 K/UL (ref 0–0.5)
EOSINOPHIL NFR BLD: 0.7 % (ref 0–8)
ERYTHROCYTE [DISTWIDTH] IN BLOOD BY AUTOMATED COUNT: 16.1 % (ref 11.5–14.5)
EST. GFR  (NO RACE VARIABLE): >60 ML/MIN/1.73 M^2
GLUCOSE SERPL-MCNC: 82 MG/DL (ref 70–110)
HCT VFR BLD AUTO: 42.1 % (ref 40–54)
HDLC SERPL-MCNC: 52 MG/DL (ref 40–75)
HDLC SERPL: 25.2 % (ref 20–50)
HGB BLD-MCNC: 13.9 G/DL (ref 14–18)
IMM GRANULOCYTES # BLD AUTO: 0.05 K/UL (ref 0–0.04)
IMM GRANULOCYTES NFR BLD AUTO: 0.5 % (ref 0–0.5)
LDLC SERPL CALC-MCNC: 124.8 MG/DL (ref 63–159)
LYMPHOCYTES # BLD AUTO: 4.3 K/UL (ref 1–4.8)
LYMPHOCYTES NFR BLD: 41.3 % (ref 18–48)
MCH RBC QN AUTO: 30.2 PG (ref 27–31)
MCHC RBC AUTO-ENTMCNC: 33 G/DL (ref 32–36)
MCV RBC AUTO: 91 FL (ref 82–98)
MONOCYTES # BLD AUTO: 0.6 K/UL (ref 0.3–1)
MONOCYTES NFR BLD: 6.1 % (ref 4–15)
NEUTROPHILS # BLD AUTO: 5.2 K/UL (ref 1.8–7.7)
NEUTROPHILS NFR BLD: 50.4 % (ref 38–73)
NONHDLC SERPL-MCNC: 154 MG/DL
NRBC BLD-RTO: 0 /100 WBC
PLATELET # BLD AUTO: 290 K/UL (ref 150–450)
PMV BLD AUTO: 10.1 FL (ref 9.2–12.9)
POTASSIUM SERPL-SCNC: 3.9 MMOL/L (ref 3.5–5.1)
PROT SERPL-MCNC: 6.6 G/DL (ref 6–8.4)
RBC # BLD AUTO: 4.61 M/UL (ref 4.6–6.2)
SODIUM SERPL-SCNC: 143 MMOL/L (ref 136–145)
TRIGL SERPL-MCNC: 146 MG/DL (ref 30–150)
TSH SERPL DL<=0.005 MIU/L-ACNC: 2.39 UIU/ML (ref 0.4–4)
WBC # BLD AUTO: 10.28 K/UL (ref 3.9–12.7)

## 2024-07-19 PROCEDURE — 80061 LIPID PANEL: CPT | Performed by: NURSE PRACTITIONER

## 2024-07-19 PROCEDURE — 85025 COMPLETE CBC W/AUTO DIFF WBC: CPT | Performed by: NURSE PRACTITIONER

## 2024-07-19 PROCEDURE — 80053 COMPREHEN METABOLIC PANEL: CPT | Performed by: NURSE PRACTITIONER

## 2024-07-19 PROCEDURE — 82306 VITAMIN D 25 HYDROXY: CPT | Performed by: NURSE PRACTITIONER

## 2024-07-19 PROCEDURE — 84443 ASSAY THYROID STIM HORMONE: CPT | Performed by: NURSE PRACTITIONER

## 2024-07-25 ENCOUNTER — LAB VISIT (OUTPATIENT)
Dept: INTERNAL MEDICINE | Facility: CLINIC | Age: 54
End: 2024-07-25
Payer: MEDICARE

## 2024-07-25 DIAGNOSIS — E78.5 HYPERLIPIDEMIA, UNSPECIFIED HYPERLIPIDEMIA TYPE: ICD-10-CM

## 2024-07-25 DIAGNOSIS — Z13.220 SCREENING FOR HYPERLIPIDEMIA: ICD-10-CM

## 2024-07-25 DIAGNOSIS — E53.8 VITAMIN B 12 DEFICIENCY: ICD-10-CM

## 2024-07-25 DIAGNOSIS — Z78.9 HEALTH MAINTENANCE ALTERATION: ICD-10-CM

## 2024-07-25 DIAGNOSIS — R68.89 OTHER GENERAL SYMPTOMS AND SIGNS: ICD-10-CM

## 2024-07-25 DIAGNOSIS — Z13.29 SCREENING FOR HYPOTHYROIDISM: ICD-10-CM

## 2024-07-25 LAB
ALBUMIN SERPL BCP-MCNC: 3.7 G/DL (ref 3.5–5.2)
ALP SERPL-CCNC: 77 U/L (ref 55–135)
ALT SERPL W/O P-5'-P-CCNC: 17 U/L (ref 10–44)
ANION GAP SERPL CALC-SCNC: 11 MMOL/L (ref 8–16)
AST SERPL-CCNC: 17 U/L (ref 10–40)
BASOPHILS # BLD AUTO: 0.14 K/UL (ref 0–0.2)
BASOPHILS NFR BLD: 1.1 % (ref 0–1.9)
BILIRUB SERPL-MCNC: 0.9 MG/DL (ref 0.1–1)
BUN SERPL-MCNC: 13 MG/DL (ref 6–20)
CALCIUM SERPL-MCNC: 10 MG/DL (ref 8.7–10.5)
CHLORIDE SERPL-SCNC: 105 MMOL/L (ref 95–110)
CHOLEST SERPL-MCNC: 239 MG/DL (ref 120–199)
CHOLEST/HDLC SERPL: 4.5 {RATIO} (ref 2–5)
CO2 SERPL-SCNC: 25 MMOL/L (ref 23–29)
CREAT SERPL-MCNC: 1.2 MG/DL (ref 0.5–1.4)
DIFFERENTIAL METHOD BLD: ABNORMAL
EOSINOPHIL # BLD AUTO: 0.2 K/UL (ref 0–0.5)
EOSINOPHIL NFR BLD: 1.5 % (ref 0–8)
ERYTHROCYTE [DISTWIDTH] IN BLOOD BY AUTOMATED COUNT: 16.2 % (ref 11.5–14.5)
EST. GFR  (NO RACE VARIABLE): >60 ML/MIN/1.73 M^2
GLUCOSE SERPL-MCNC: 131 MG/DL (ref 70–110)
HCT VFR BLD AUTO: 47.9 % (ref 40–54)
HDLC SERPL-MCNC: 53 MG/DL (ref 40–75)
HDLC SERPL: 22.2 % (ref 20–50)
HGB BLD-MCNC: 15.4 G/DL (ref 14–18)
IMM GRANULOCYTES # BLD AUTO: 0.06 K/UL (ref 0–0.04)
IMM GRANULOCYTES NFR BLD AUTO: 0.5 % (ref 0–0.5)
LDLC SERPL CALC-MCNC: 143.6 MG/DL (ref 63–159)
LYMPHOCYTES # BLD AUTO: 4.9 K/UL (ref 1–4.8)
LYMPHOCYTES NFR BLD: 38.4 % (ref 18–48)
MCH RBC QN AUTO: 29.7 PG (ref 27–31)
MCHC RBC AUTO-ENTMCNC: 32.2 G/DL (ref 32–36)
MCV RBC AUTO: 92 FL (ref 82–98)
MONOCYTES # BLD AUTO: 0.7 K/UL (ref 0.3–1)
MONOCYTES NFR BLD: 5.6 % (ref 4–15)
NEUTROPHILS # BLD AUTO: 6.8 K/UL (ref 1.8–7.7)
NEUTROPHILS NFR BLD: 52.9 % (ref 38–73)
NONHDLC SERPL-MCNC: 186 MG/DL
NRBC BLD-RTO: 0 /100 WBC
PLATELET # BLD AUTO: 349 K/UL (ref 150–450)
PMV BLD AUTO: 10 FL (ref 9.2–12.9)
POTASSIUM SERPL-SCNC: 4.3 MMOL/L (ref 3.5–5.1)
PROT SERPL-MCNC: 7.6 G/DL (ref 6–8.4)
RBC # BLD AUTO: 5.19 M/UL (ref 4.6–6.2)
SODIUM SERPL-SCNC: 141 MMOL/L (ref 136–145)
TRIGL SERPL-MCNC: 212 MG/DL (ref 30–150)
TSH SERPL DL<=0.005 MIU/L-ACNC: 3.42 UIU/ML (ref 0.4–4)
VIT B12 SERPL-MCNC: 1048 PG/ML (ref 210–950)
WBC # BLD AUTO: 12.8 K/UL (ref 3.9–12.7)

## 2024-07-25 PROCEDURE — 80053 COMPREHEN METABOLIC PANEL: CPT | Performed by: NURSE PRACTITIONER

## 2024-07-25 PROCEDURE — 80061 LIPID PANEL: CPT | Performed by: NURSE PRACTITIONER

## 2024-07-25 PROCEDURE — 84443 ASSAY THYROID STIM HORMONE: CPT | Performed by: NURSE PRACTITIONER

## 2024-07-25 PROCEDURE — 82607 VITAMIN B-12: CPT | Performed by: NURSE PRACTITIONER

## 2024-07-25 PROCEDURE — 85025 COMPLETE CBC W/AUTO DIFF WBC: CPT | Performed by: NURSE PRACTITIONER

## 2024-07-25 NOTE — PROGRESS NOTES
Venipuncture Collected.     Number of Sticks: 2  Site #1: Right Hand  Site #2: Right Hand  Site #3: N/A    Complications? Blown  Additional Comments:

## 2024-08-05 ENCOUNTER — DOCUMENT SCAN (OUTPATIENT)
Dept: HOME HEALTH SERVICES | Facility: HOSPITAL | Age: 54
End: 2024-08-05
Payer: MEDICARE

## 2024-08-05 ENCOUNTER — EXTERNAL HOME HEALTH (OUTPATIENT)
Dept: HOME HEALTH SERVICES | Facility: HOSPITAL | Age: 54
End: 2024-08-05
Payer: MEDICARE

## 2024-08-13 ENCOUNTER — OFFICE VISIT (OUTPATIENT)
Dept: INTERNAL MEDICINE | Facility: CLINIC | Age: 54
End: 2024-08-13
Payer: MEDICARE

## 2024-08-13 VITALS
DIASTOLIC BLOOD PRESSURE: 72 MMHG | HEIGHT: 60 IN | RESPIRATION RATE: 18 BRPM | BODY MASS INDEX: 27.65 KG/M2 | HEART RATE: 111 BPM | OXYGEN SATURATION: 96 % | SYSTOLIC BLOOD PRESSURE: 112 MMHG

## 2024-08-13 DIAGNOSIS — R00.0 SINUS TACHYCARDIA: ICD-10-CM

## 2024-08-13 DIAGNOSIS — R73.9 ELEVATED BLOOD SUGAR: ICD-10-CM

## 2024-08-13 DIAGNOSIS — E78.5 HYPERLIPIDEMIA, UNSPECIFIED HYPERLIPIDEMIA TYPE: ICD-10-CM

## 2024-08-13 DIAGNOSIS — Q90.9 DOWN SYNDROME: Primary | ICD-10-CM

## 2024-08-13 DIAGNOSIS — E66.3 OVERWEIGHT (BMI 25.0-29.9): ICD-10-CM

## 2024-08-13 PROCEDURE — 3008F BODY MASS INDEX DOCD: CPT | Mod: CPTII,S$GLB,, | Performed by: NURSE PRACTITIONER

## 2024-08-13 PROCEDURE — 99999 PR PBB SHADOW E&M-EST. PATIENT-LVL III: CPT | Mod: PBBFAC,,, | Performed by: NURSE PRACTITIONER

## 2024-08-13 PROCEDURE — 99214 OFFICE O/P EST MOD 30 MIN: CPT | Mod: S$GLB,,, | Performed by: NURSE PRACTITIONER

## 2024-08-13 PROCEDURE — 1160F RVW MEDS BY RX/DR IN RCRD: CPT | Mod: CPTII,S$GLB,, | Performed by: NURSE PRACTITIONER

## 2024-08-13 PROCEDURE — 1159F MED LIST DOCD IN RCRD: CPT | Mod: CPTII,S$GLB,, | Performed by: NURSE PRACTITIONER

## 2024-08-13 PROCEDURE — 3074F SYST BP LT 130 MM HG: CPT | Mod: CPTII,S$GLB,, | Performed by: NURSE PRACTITIONER

## 2024-08-13 PROCEDURE — 3078F DIAST BP <80 MM HG: CPT | Mod: CPTII,S$GLB,, | Performed by: NURSE PRACTITIONER

## 2024-08-13 RX ORDER — ROSUVASTATIN CALCIUM 20 MG/1
20 TABLET, COATED ORAL DAILY
Qty: 90 TABLET | Refills: 1 | Status: SHIPPED | OUTPATIENT
Start: 2024-08-13

## 2024-08-13 NOTE — PROGRESS NOTES
Subjective:       Patient ID: Janes Strange is a 54 y.o. male.    Chief Complaint: Follow-up (6 months)    HPI: Pt presents to clinic today known to me with c/o needing routine eval with his dad. He had labs 7/19/24 and 7/25/24???    B12 1048  Tsh 3.424   Total chol 239, trig 212, hdl 53, ldl 143   BMP  Lab Results   Component Value Date     07/25/2024    K 4.3 07/25/2024     07/25/2024    CO2 25 07/25/2024    BUN 13 07/25/2024    CREATININE 1.2 07/25/2024    CALCIUM 10.0 07/25/2024    ANIONGAP 11 07/25/2024    EGFRNORACEVR >60 07/25/2024     Lab Results   Component Value Date    ALT 17 07/25/2024    AST 17 07/25/2024    ALKPHOS 77 07/25/2024    BILITOT 0.9 07/25/2024     Glucose 131 (but 3 weeks ago it was 82)    Lab Results   Component Value Date    WBC 12.80 (H) 07/25/2024    HGB 15.4 07/25/2024    HCT 47.9 07/25/2024    MCV 92 07/25/2024     07/25/2024   (3 week ago had normal WBC10.28)      Review of Systems   Constitutional:  Negative for chills and fever.   HENT:  Negative for congestion, postnasal drip and sore throat.    Eyes:  Negative for photophobia.   Respiratory:  Negative for chest tightness and shortness of breath.    Cardiovascular:  Negative for chest pain.   Gastrointestinal:  Negative for abdominal distention, abdominal pain, blood in stool and vomiting.   Genitourinary:  Negative for dysuria, flank pain and hematuria.   Musculoskeletal:  Negative for back pain.   Skin:  Negative for pallor.   Neurological:  Negative for dizziness, seizures, facial asymmetry, speech difficulty and numbness.   Hematological:  Does not bruise/bleed easily.   Psychiatric/Behavioral:  Negative for agitation and suicidal ideas. The patient is not nervous/anxious.        Objective:      Physical Exam  Vitals and nursing note reviewed. Exam conducted with a chaperone present (his father).   Constitutional:       Appearance: He is well-developed.   HENT:      Head: Normocephalic and atraumatic.       Nose: Nose normal.   Eyes:      Conjunctiva/sclera: Conjunctivae normal.      Pupils: Pupils are equal, round, and reactive to light.   Neck:      Thyroid: No thyromegaly.      Vascular: No JVD.   Cardiovascular:      Rate and Rhythm: Normal rate and regular rhythm.      Heart sounds: Normal heart sounds. No murmur heard.  Pulmonary:      Effort: Pulmonary effort is normal. No respiratory distress.      Breath sounds: Normal breath sounds. No wheezing.   Abdominal:      General: Bowel sounds are normal. There is no distension.      Palpations: Abdomen is soft. There is no mass.      Tenderness: There is no abdominal tenderness. There is no guarding.   Musculoskeletal:         General: Normal range of motion.      Cervical back: Normal range of motion and neck supple.   Lymphadenopathy:      Cervical: No cervical adenopathy.   Skin:     General: Skin is warm and dry.      Coloration: Skin is not pale.      Findings: No rash.   Neurological:      Mental Status: He is alert and oriented to person, place, and time.      Cranial Nerves: No cranial nerve deficit.      Deep Tendon Reflexes: Reflexes are normal and symmetric.         Assessment:       1. Down syndrome    2. Hyperlipidemia, unspecified hyperlipidemia type    3. Overweight (BMI 25.0-29.9)    4. Sinus tachycardia    5. Elevated blood sugar        Plan:     Problem List Items Addressed This Visit       Down syndrome - Primary    Relevant Orders    CBC Auto Differential    Comprehensive Metabolic Panel    HLD (hyperlipidemia)    Relevant Orders    Lipid Panel    Sinus tachycardia    Relevant Orders    TSH    Overweight (BMI 25.0-29.9)    Relevant Orders    TSH     Other Visit Diagnoses       Elevated blood sugar        Relevant Orders    Hemoglobin A1C            Increase crestor encourage exercise   Repeat labs and f/u 6 months

## 2024-08-15 ENCOUNTER — PATIENT OUTREACH (OUTPATIENT)
Dept: ADMINISTRATIVE | Facility: HOSPITAL | Age: 54
End: 2024-08-15
Payer: MEDICARE

## 2024-08-15 NOTE — PROGRESS NOTES
[facility] Colon Cancer Screening Gap Report 8.6.24  Chart reviewed, immunization record updated.  No new results noted on [facility] or [facility] web site.  Care Everywhere updated.   Patient care coordination note  Upcoming PCP visit updated.  Next PCP visit 2/11/2025.  Attempted to contact patient's caregiver to discuss Colorectal Cancer Screening and scheduling eAWV, unable to contact. (Unable to contact, number dialed has calling restrictions that have prevented the completion of your call.)

## 2024-08-19 RX ORDER — TAMSULOSIN HYDROCHLORIDE 0.4 MG/1
1 CAPSULE ORAL NIGHTLY
Qty: 30 CAPSULE | Refills: 0 | Status: SHIPPED | OUTPATIENT
Start: 2024-08-19

## 2024-09-13 NOTE — TELEPHONE ENCOUNTER
----- Message from Kelly Woody MA sent at 2024  3:14 PM CDT -----  Janes Strange  MRN: [id number]  : 1970  PCP: Luna Kelly  Home Phone      [phone]  Work Phone      Not on file.  Mobile          [phone]      MESSAGE:     [facility] needs new script for Tamsulosin.    Please send in new script.

## 2024-09-13 NOTE — TELEPHONE ENCOUNTER
LOV 8/13/2024  Scheduled visit 2/11/2025    Requested Prescriptions     Pending Prescriptions Disp Refills    tamsulosin (FLOMAX) 0.4 mg Cap 30 capsule 5     Sig: Take 1 capsule (0.4 mg total) by mouth every evening.

## 2024-09-16 RX ORDER — TAMSULOSIN HYDROCHLORIDE 0.4 MG/1
1 CAPSULE ORAL NIGHTLY
Qty: 30 CAPSULE | Refills: 5 | Status: SHIPPED | OUTPATIENT
Start: 2024-09-16

## 2024-09-23 PROBLEM — J18.9 PNEUMONIA: Status: RESOLVED | Noted: 2024-06-24 | Resolved: 2024-09-23

## 2024-09-30 PROBLEM — A41.9 SEPSIS DUE TO PNEUMONIA: Status: RESOLVED | Noted: 2024-06-24 | Resolved: 2024-09-30

## 2024-09-30 PROBLEM — J18.9 SEPSIS DUE TO PNEUMONIA: Status: RESOLVED | Noted: 2024-06-24 | Resolved: 2024-09-30

## 2025-01-09 RX ORDER — ROSUVASTATIN CALCIUM 20 MG/1
20 TABLET, COATED ORAL NIGHTLY
Qty: 90 TABLET | Refills: 1 | Status: SHIPPED | OUTPATIENT
Start: 2025-01-09

## 2025-02-06 ENCOUNTER — LAB VISIT (OUTPATIENT)
Dept: INTERNAL MEDICINE | Facility: CLINIC | Age: 55
End: 2025-02-06
Payer: MEDICARE

## 2025-02-06 DIAGNOSIS — E78.5 HYPERLIPIDEMIA, UNSPECIFIED HYPERLIPIDEMIA TYPE: ICD-10-CM

## 2025-02-06 DIAGNOSIS — R73.9 ELEVATED BLOOD SUGAR: ICD-10-CM

## 2025-02-06 DIAGNOSIS — R00.0 SINUS TACHYCARDIA: ICD-10-CM

## 2025-02-06 DIAGNOSIS — Q90.9 DOWN SYNDROME: ICD-10-CM

## 2025-02-06 DIAGNOSIS — E66.3 OVERWEIGHT (BMI 25.0-29.9): ICD-10-CM

## 2025-02-06 LAB
ALBUMIN SERPL BCP-MCNC: 3.5 G/DL (ref 3.5–5.2)
ALP SERPL-CCNC: 80 U/L (ref 40–150)
ALT SERPL W/O P-5'-P-CCNC: 19 U/L (ref 10–44)
ANION GAP SERPL CALC-SCNC: 10 MMOL/L (ref 8–16)
AST SERPL-CCNC: 23 U/L (ref 10–40)
BASOPHILS # BLD AUTO: 0.19 K/UL (ref 0–0.2)
BASOPHILS NFR BLD: 1.4 % (ref 0–1.9)
BILIRUB SERPL-MCNC: 0.5 MG/DL (ref 0.1–1)
BUN SERPL-MCNC: 13 MG/DL (ref 6–20)
CALCIUM SERPL-MCNC: 9.6 MG/DL (ref 8.7–10.5)
CHLORIDE SERPL-SCNC: 103 MMOL/L (ref 95–110)
CHOLEST SERPL-MCNC: 174 MG/DL (ref 120–199)
CHOLEST/HDLC SERPL: 4.4 {RATIO} (ref 2–5)
CO2 SERPL-SCNC: 28 MMOL/L (ref 23–29)
CREAT SERPL-MCNC: 1 MG/DL (ref 0.5–1.4)
DIFFERENTIAL METHOD BLD: ABNORMAL
EOSINOPHIL # BLD AUTO: 0.2 K/UL (ref 0–0.5)
EOSINOPHIL NFR BLD: 1.3 % (ref 0–8)
ERYTHROCYTE [DISTWIDTH] IN BLOOD BY AUTOMATED COUNT: 15.2 % (ref 11.5–14.5)
EST. GFR  (NO RACE VARIABLE): >60 ML/MIN/1.73 M^2
ESTIMATED AVG GLUCOSE: 126 MG/DL (ref 68–131)
GLUCOSE SERPL-MCNC: 124 MG/DL (ref 70–110)
HBA1C MFR BLD: 6 % (ref 4–5.6)
HCT VFR BLD AUTO: 48.1 % (ref 40–54)
HDLC SERPL-MCNC: 40 MG/DL (ref 40–75)
HDLC SERPL: 23 % (ref 20–50)
HGB BLD-MCNC: 15.4 G/DL (ref 14–18)
IMM GRANULOCYTES # BLD AUTO: 0.06 K/UL (ref 0–0.04)
IMM GRANULOCYTES NFR BLD AUTO: 0.4 % (ref 0–0.5)
LDLC SERPL CALC-MCNC: 91.6 MG/DL (ref 63–159)
LYMPHOCYTES # BLD AUTO: 3.6 K/UL (ref 1–4.8)
LYMPHOCYTES NFR BLD: 26 % (ref 18–48)
MCH RBC QN AUTO: 29.1 PG (ref 27–31)
MCHC RBC AUTO-ENTMCNC: 32 G/DL (ref 32–36)
MCV RBC AUTO: 91 FL (ref 82–98)
MONOCYTES # BLD AUTO: 0.7 K/UL (ref 0.3–1)
MONOCYTES NFR BLD: 4.9 % (ref 4–15)
NEUTROPHILS # BLD AUTO: 9.1 K/UL (ref 1.8–7.7)
NEUTROPHILS NFR BLD: 66 % (ref 38–73)
NONHDLC SERPL-MCNC: 134 MG/DL
NRBC BLD-RTO: 0 /100 WBC
PLATELET # BLD AUTO: 334 K/UL (ref 150–450)
PMV BLD AUTO: 9.8 FL (ref 9.2–12.9)
POTASSIUM SERPL-SCNC: 4.5 MMOL/L (ref 3.5–5.1)
PROT SERPL-MCNC: 7.6 G/DL (ref 6–8.4)
RBC # BLD AUTO: 5.29 M/UL (ref 4.6–6.2)
SODIUM SERPL-SCNC: 141 MMOL/L (ref 136–145)
T4 FREE SERPL-MCNC: 0.91 NG/DL (ref 0.71–1.51)
TRIGL SERPL-MCNC: 212 MG/DL (ref 30–150)
TSH SERPL DL<=0.005 MIU/L-ACNC: 4.45 UIU/ML (ref 0.4–4)
WBC # BLD AUTO: 13.75 K/UL (ref 3.9–12.7)

## 2025-02-06 PROCEDURE — 80061 LIPID PANEL: CPT | Performed by: NURSE PRACTITIONER

## 2025-02-06 PROCEDURE — 84439 ASSAY OF FREE THYROXINE: CPT | Performed by: NURSE PRACTITIONER

## 2025-02-06 PROCEDURE — 84443 ASSAY THYROID STIM HORMONE: CPT | Performed by: NURSE PRACTITIONER

## 2025-02-06 PROCEDURE — 80053 COMPREHEN METABOLIC PANEL: CPT | Performed by: NURSE PRACTITIONER

## 2025-02-06 PROCEDURE — 83036 HEMOGLOBIN GLYCOSYLATED A1C: CPT | Performed by: NURSE PRACTITIONER

## 2025-02-06 PROCEDURE — 85025 COMPLETE CBC W/AUTO DIFF WBC: CPT | Performed by: NURSE PRACTITIONER

## 2025-02-06 PROCEDURE — 36415 COLL VENOUS BLD VENIPUNCTURE: CPT | Mod: S$GLB,,, | Performed by: NURSE PRACTITIONER

## 2025-02-11 ENCOUNTER — OFFICE VISIT (OUTPATIENT)
Dept: INTERNAL MEDICINE | Facility: CLINIC | Age: 55
End: 2025-02-11
Payer: MEDICARE

## 2025-02-11 VITALS
OXYGEN SATURATION: 95 % | BODY MASS INDEX: 27.65 KG/M2 | SYSTOLIC BLOOD PRESSURE: 124 MMHG | DIASTOLIC BLOOD PRESSURE: 70 MMHG | HEART RATE: 101 BPM | HEIGHT: 60 IN

## 2025-02-11 DIAGNOSIS — R73.03 PRE-DIABETES: ICD-10-CM

## 2025-02-11 DIAGNOSIS — E78.5 HYPERLIPIDEMIA, UNSPECIFIED HYPERLIPIDEMIA TYPE: ICD-10-CM

## 2025-02-11 DIAGNOSIS — Z86.73 HISTORY OF TIA (TRANSIENT ISCHEMIC ATTACK): ICD-10-CM

## 2025-02-11 DIAGNOSIS — R73.03 PREDIABETES: ICD-10-CM

## 2025-02-11 DIAGNOSIS — Z12.5 ENCOUNTER FOR SCREENING FOR MALIGNANT NEOPLASM OF PROSTATE: ICD-10-CM

## 2025-02-11 DIAGNOSIS — R33.9 URINARY RETENTION: ICD-10-CM

## 2025-02-11 DIAGNOSIS — Q90.9 DOWN SYNDROME: Primary | ICD-10-CM

## 2025-02-11 PROCEDURE — 3074F SYST BP LT 130 MM HG: CPT | Mod: CPTII,S$GLB,, | Performed by: NURSE PRACTITIONER

## 2025-02-11 PROCEDURE — 3008F BODY MASS INDEX DOCD: CPT | Mod: CPTII,S$GLB,, | Performed by: NURSE PRACTITIONER

## 2025-02-11 PROCEDURE — 1159F MED LIST DOCD IN RCRD: CPT | Mod: CPTII,S$GLB,, | Performed by: NURSE PRACTITIONER

## 2025-02-11 PROCEDURE — 99999 PR PBB SHADOW E&M-EST. PATIENT-LVL III: CPT | Mod: PBBFAC,,, | Performed by: NURSE PRACTITIONER

## 2025-02-11 PROCEDURE — 99214 OFFICE O/P EST MOD 30 MIN: CPT | Mod: S$GLB,,, | Performed by: NURSE PRACTITIONER

## 2025-02-11 PROCEDURE — 3078F DIAST BP <80 MM HG: CPT | Mod: CPTII,S$GLB,, | Performed by: NURSE PRACTITIONER

## 2025-02-11 PROCEDURE — 3044F HG A1C LEVEL LT 7.0%: CPT | Mod: CPTII,S$GLB,, | Performed by: NURSE PRACTITIONER

## 2025-02-11 RX ORDER — TAMSULOSIN HYDROCHLORIDE 0.4 MG/1
1 CAPSULE ORAL NIGHTLY
Qty: 90 CAPSULE | Refills: 3 | Status: SHIPPED | OUTPATIENT
Start: 2025-02-11

## 2025-02-11 NOTE — PROGRESS NOTES
Subjective:       Patient ID: Janes Strange is a 54 y.o. male.    Chief Complaint: Follow-up (6 months)  With his father - he has no complaints and his father report that same     History of Present Illness    CHIEF COMPLAINT:  Janes presents today for follow up.    GENITOURINARY:  He reports urinating 3-4 times per day.    LABS:  A1C was 6.0, within target range. Kidney and liver function tests were normal. Complete blood count showed slightly elevated white blood cell count, improved from previous results and attributed to inflammation. Thyroid function test revealed slightly elevated TSH at 4.4, consistent with his baseline. Cholesterol panel showed improvement, with LDL cholesterol decreasing from 140 to 91.    MEDICATIONS:  He takes daily cholesterol medication.      ROS:  Genitourinary: +frequency          Objective:      Physical Exam    General: No acute distress. Well-developed. Well-nourished.  Eyes: EOMI. Sclerae anicteric.  HENT: Normocephalic. Atraumatic. Nares patent. Moist oral mucosa.  Ears: Bilateral TMs clear. Bilateral EACs clear.  Cardiovascular: Regular rate. Regular rhythm. No murmurs. No rubs. No gallops. Normal S1, S2.  Respiratory: Normal respiratory effort. Clear to auscultation bilaterally. No rales. No rhonchi. No wheezing.  Abdomen: Soft. Non-tender. Non-distended. Normoactive bowel sounds.  Musculoskeletal: No  obvious deformity.  Extremities: No lower extremity edema.  Neurological: Alert & oriented x3. No slurred speech. Normal gait.  Psychiatric: Normal mood. Normal affect. Good insight. Good judgment.  Skin: Warm. Dry. No rash.          Assessment:       1. Down syndrome    2. Hyperlipidemia, unspecified hyperlipidemia type    3. History of TIA (transient ischemic attack)    4. Urinary retention    5. Pre-diabetes    6. Encounter for screening for malignant neoplasm of prostate    7. Prediabetes        Plan:     Problem List Items Addressed This Visit       Down syndrome - Primary     Relevant Orders    CBC Auto Differential    TSH    History of TIA (transient ischemic attack)  On plavix and statin      HLD (hyperlipidemia)    Relevant Orders    Comprehensive Metabolic Panel    Lipid Panel  On statin      Urinary retention    Relevant Orders    PSA, Screening  On flomax      Other Visit Diagnoses       Pre-diabetes        Relevant Orders    Hemoglobin A1C  6.0     Encounter for screening for malignant neoplasm of prostate        Relevant Orders    PSA, Screening    Prediabetes    A1c 6.0    Relevant Orders    TSH    Microalbumin/Creatinine Ratio, Urine          Assessment & Plan    IMPRESSION:  - Reviewed recent lab results:  - - A1C at 6.0, indicating optimal blood sugar control  - - Kidney and liver function tests normal  - - Slightly elevated white blood cell count, improved from previous results  - - TSH at 4.4, slightly elevated but consistent with patient's baseline  - - LDL cholesterol decreased from 140 to 91, indicating good response to current cholesterol medication  - Will maintain current medication regimen without changes, given overall stable results    HYPERLIPIDEMIA:  - Evaluated the patient's cholesterol levels and found them to be improved, with LDL (bad cholesterol) decreasing from 140 to 91.  - Reviewed the patient's lab work, including cholesterol levels.  - Continued the current cholesterol medication at the same dosage, as it appears to be effective.  - Instructed the patient to continue taking the daily cholesterol medication as prescribed.    LABS:  - Ordered routine 6-month follow-up labs.    FOLLOW UP:  - Scheduled a follow-up visit in 6 months for August 2025.      No changes to labs monitor tsh and A1C     This note was generated with the assistance of ambient listening technology. Verbal consent was obtained by the patient and accompanying visitor(s) for the recording of patient appointment to facilitate this note. I attest to having reviewed and edited the generated  note for accuracy, though some syntax or spelling errors may persist. Please contact the author of this note for any clarification.

## 2025-02-13 ENCOUNTER — PATIENT OUTREACH (OUTPATIENT)
Dept: ADMINISTRATIVE | Facility: HOSPITAL | Age: 55
End: 2025-02-13
Payer: MEDICARE

## 2025-02-13 NOTE — PROGRESS NOTES
Mail box is full not able to leave a message .  Patient is due for colorectal cancer screen , patient was recently in clinic will reschedule outreach for 1 month out

## 2025-02-22 DIAGNOSIS — Z00.00 ENCOUNTER FOR MEDICARE ANNUAL WELLNESS EXAM: ICD-10-CM

## 2025-05-07 ENCOUNTER — PATIENT OUTREACH (OUTPATIENT)
Dept: ADMINISTRATIVE | Facility: HOSPITAL | Age: 55
End: 2025-05-07
Payer: MEDICARE

## 2025-05-07 NOTE — PROGRESS NOTES
Portal active: no  Chart reviewed, immunization record updated.  No new results noted on [facility] or [email or web address] web site.  Care Everywhere updated.   Patient care coordination note  Upcoming PCP visit updated.  Next PCP visit 8/14/25  LOV with PCP 2/11/25  Left message to call clinic to discuss if he wants colorectal cancer screen

## 2025-07-03 DIAGNOSIS — K21.9 GASTROESOPHAGEAL REFLUX DISEASE: ICD-10-CM

## 2025-07-03 NOTE — TELEPHONE ENCOUNTER
Medication phoned in during your absence.       Requested Prescriptions     Pending Prescriptions Disp Refills    omeprazole (PRILOSEC) 20 MG capsule [Pharmacy Med Name: omeprazole 20 mg capsule,delayed release] 30 capsule 11     Sig: TAKE ONE CAPSULE BY MOUTH EVERY MORNING

## 2025-07-06 RX ORDER — OMEPRAZOLE 20 MG/1
20 CAPSULE, DELAYED RELEASE ORAL EVERY MORNING
Qty: 30 CAPSULE | Refills: 11 | Status: SHIPPED | OUTPATIENT
Start: 2025-07-06

## 2025-07-07 DIAGNOSIS — M10.069 IDIOPATHIC GOUT OF KNEE, UNSPECIFIED CHRONICITY, UNSPECIFIED LATERALITY: ICD-10-CM

## 2025-07-07 DIAGNOSIS — Z86.73 HISTORY OF TIA (TRANSIENT ISCHEMIC ATTACK): ICD-10-CM

## 2025-07-07 RX ORDER — ALLOPURINOL 300 MG/1
300 TABLET ORAL EVERY MORNING
Qty: 360 TABLET | Refills: 0 | Status: SHIPPED | OUTPATIENT
Start: 2025-07-07

## 2025-07-07 RX ORDER — CLOPIDOGREL BISULFATE 75 MG/1
75 TABLET ORAL EVERY MORNING
Qty: 360 TABLET | Refills: 0 | Status: SHIPPED | OUTPATIENT
Start: 2025-07-07

## 2025-07-11 DIAGNOSIS — Z88.9 H/O SEASONAL ALLERGIES: ICD-10-CM

## 2025-07-14 RX ORDER — LORATADINE 10 MG/1
10 TABLET ORAL EVERY MORNING
Qty: 90 TABLET | Refills: 3 | Status: SHIPPED | OUTPATIENT
Start: 2025-07-14

## 2025-07-14 RX ORDER — ROSUVASTATIN CALCIUM 20 MG/1
20 TABLET, COATED ORAL NIGHTLY
Qty: 90 TABLET | Refills: 1 | Status: SHIPPED | OUTPATIENT
Start: 2025-07-14

## 2025-08-06 ENCOUNTER — TELEPHONE (OUTPATIENT)
Dept: INTERNAL MEDICINE | Facility: CLINIC | Age: 55
End: 2025-08-06
Payer: MEDICARE

## 2025-08-06 NOTE — TELEPHONE ENCOUNTER
----- Message from Nikky sent at 2025  1:47 PM CDT -----  Contact: Shoshana Strange  MRN: [id number]  : 1970  PCP: Luna Kelly  Home Phone      [phone]  Work Phone      Not on file.  Mobile          [phone]      MESSAGE: Shoshana left a message on the phone stating she was taking care of Janes now that her dad passed away. She states he needs some medication refilled. Please advise.   Can you please get the date of death on her dad so I can up date his chart        [phone]

## 2025-08-06 NOTE — TELEPHONE ENCOUNTER
Spoke with MsVenice Shoshana and she stated she needs a print out of the patient's medication list and allergies. She is unsure what medications need to be refilled at this time. She is still in the process of getting everything for the patient under her name. Patient will come by the clinic before the end of the week to  his medication list.

## 2025-08-07 ENCOUNTER — TELEPHONE (OUTPATIENT)
Dept: INTERNAL MEDICINE | Facility: CLINIC | Age: 55
End: 2025-08-07
Payer: MEDICARE

## 2025-08-07 DIAGNOSIS — R05.9 COUGH, UNSPECIFIED TYPE: Primary | ICD-10-CM

## 2025-08-07 RX ORDER — PROMETHAZINE HYDROCHLORIDE AND DEXTROMETHORPHAN HYDROBROMIDE 6.25; 15 MG/5ML; MG/5ML
5 SYRUP ORAL EVERY 6 HOURS PRN
Qty: 120 ML | Refills: 0 | Status: SHIPPED | OUTPATIENT
Start: 2025-08-07 | End: 2025-08-17

## 2025-08-07 NOTE — TELEPHONE ENCOUNTER
Mr Marshall used to call every so often for a refill on promethazine with DM for Janes- that is what I sent but if it does not help he cough have kamala bring him in next week for eval

## 2025-08-07 NOTE — TELEPHONE ENCOUNTER
----- Message from Abbey sent at 2025  2:41 PM CDT -----  Contact: Doyle Strange  MRN: [id number]  : 1970  PCP: Luna Kelly  Home Phone      [phone]  Work Phone      Not on file.  Mobile          [phone]      MESSAGE:     Doyle, pts sister, requests a cough medicine be called in for pt.  States Carrasco will know what he used before and can handle.    [facility] [place]  [address] [place] [address]  Phone: [phone] Fax: [phone]  Hours: Not open 24 hours    [phone]

## 2025-08-08 NOTE — TELEPHONE ENCOUNTER
Shoshana notified of medication. States that she might not be able to bring him in for an appt. She said that pt will be going to their cousin's house. She said they are working on getting the cousin to have power or  and they live near [place] and he might be changing to a provider out that way. For now, we will keep his upcoming appt that's scheduled, but that might change.